# Patient Record
Sex: MALE | Race: ASIAN | NOT HISPANIC OR LATINO | Employment: FULL TIME | URBAN - METROPOLITAN AREA
[De-identification: names, ages, dates, MRNs, and addresses within clinical notes are randomized per-mention and may not be internally consistent; named-entity substitution may affect disease eponyms.]

---

## 2021-03-29 ENCOUNTER — AMBULATORY - HEALTHEAST (OUTPATIENT)
Dept: FAMILY MEDICINE | Facility: CLINIC | Age: 41
End: 2021-03-29

## 2021-03-29 ENCOUNTER — OFFICE VISIT - HEALTHEAST (OUTPATIENT)
Dept: FAMILY MEDICINE | Facility: CLINIC | Age: 41
End: 2021-03-29

## 2021-03-29 DIAGNOSIS — M1A.00X0 CHRONIC GOUTY ARTHRITIS: ICD-10-CM

## 2021-03-29 DIAGNOSIS — R03.0 ELEVATED BLOOD PRESSURE READING WITHOUT DIAGNOSIS OF HYPERTENSION: ICD-10-CM

## 2021-03-29 DIAGNOSIS — L03.90 CELLULITIS, UNSPECIFIED CELLULITIS SITE: ICD-10-CM

## 2021-03-29 DIAGNOSIS — M10.9 ACUTE GOUTY ARTHRITIS: ICD-10-CM

## 2021-03-29 DIAGNOSIS — I10 BENIGN ESSENTIAL HYPERTENSION: ICD-10-CM

## 2021-03-29 DIAGNOSIS — R22.9 LOCALIZED SUPERFICIAL SWELLING, MASS, OR LUMP: ICD-10-CM

## 2021-03-29 LAB
ALBUMIN SERPL-MCNC: 3.8 G/DL (ref 3.5–5)
ALP SERPL-CCNC: 101 U/L (ref 45–120)
ALT SERPL W P-5'-P-CCNC: 35 U/L (ref 0–45)
ANION GAP SERPL CALCULATED.3IONS-SCNC: 13 MMOL/L (ref 5–18)
AST SERPL W P-5'-P-CCNC: 24 U/L (ref 0–40)
BASOPHILS # BLD AUTO: 0.1 THOU/UL (ref 0–0.2)
BASOPHILS NFR BLD AUTO: 0 % (ref 0–2)
BILIRUB SERPL-MCNC: 0.4 MG/DL (ref 0–1)
BUN SERPL-MCNC: 17 MG/DL (ref 8–22)
C REACTIVE PROTEIN LHE: 0.4 MG/DL (ref 0–0.8)
CALCIUM SERPL-MCNC: 9.2 MG/DL (ref 8.5–10.5)
CHLORIDE BLD-SCNC: 107 MMOL/L (ref 98–107)
CO2 SERPL-SCNC: 20 MMOL/L (ref 22–31)
CREAT SERPL-MCNC: 0.86 MG/DL (ref 0.7–1.3)
EOSINOPHIL # BLD AUTO: 0.2 THOU/UL (ref 0–0.4)
EOSINOPHIL NFR BLD AUTO: 2 % (ref 0–6)
ERYTHROCYTE [DISTWIDTH] IN BLOOD BY AUTOMATED COUNT: 20.8 % (ref 11–14.5)
ERYTHROCYTE [SEDIMENTATION RATE] IN BLOOD BY WESTERGREN METHOD: 18 MM/HR (ref 0–15)
GFR SERPL CREATININE-BSD FRML MDRD: >60 ML/MIN/1.73M2
GLUCOSE BLD-MCNC: 98 MG/DL (ref 70–125)
HCT VFR BLD AUTO: 38.4 % (ref 40–54)
HGB BLD-MCNC: 11.2 G/DL (ref 14–18)
IMM GRANULOCYTES # BLD: 0 THOU/UL
IMM GRANULOCYTES NFR BLD: 0 %
LYMPHOCYTES # BLD AUTO: 2.8 THOU/UL (ref 0.8–4.4)
LYMPHOCYTES NFR BLD AUTO: 25 % (ref 20–40)
MCH RBC QN AUTO: 19.9 PG (ref 27–34)
MCHC RBC AUTO-ENTMCNC: 29.2 G/DL (ref 32–36)
MCV RBC AUTO: 68 FL (ref 80–100)
MONOCYTES # BLD AUTO: 0.8 THOU/UL (ref 0–0.9)
MONOCYTES NFR BLD AUTO: 7 % (ref 2–10)
NEUTROPHILS # BLD AUTO: 7.4 THOU/UL (ref 2–7.7)
NEUTROPHILS NFR BLD AUTO: 66 % (ref 50–70)
PLATELET # BLD AUTO: 411 THOU/UL (ref 140–440)
PMV BLD AUTO: 8.3 FL (ref 7–10)
POTASSIUM BLD-SCNC: 4.1 MMOL/L (ref 3.5–5)
PROT SERPL-MCNC: 7.9 G/DL (ref 6–8)
RBC # BLD AUTO: 5.62 MILL/UL (ref 4.4–6.2)
SODIUM SERPL-SCNC: 140 MMOL/L (ref 136–145)
URATE SERPL-MCNC: 10.6 MG/DL (ref 3–8)
WBC: 11.2 THOU/UL (ref 4–11)

## 2021-03-29 RX ORDER — AMLODIPINE BESYLATE 5 MG/1
5 TABLET ORAL DAILY
Qty: 90 TABLET | Refills: 3 | Status: SHIPPED | OUTPATIENT
Start: 2021-03-29 | End: 2023-07-03

## 2021-03-29 RX ORDER — DOXYCYCLINE 100 MG/1
100 CAPSULE ORAL 2 TIMES DAILY
Qty: 20 CAPSULE | Refills: 0 | Status: SHIPPED | OUTPATIENT
Start: 2021-03-29 | End: 2023-07-03

## 2021-03-30 ENCOUNTER — COMMUNICATION - HEALTHEAST (OUTPATIENT)
Dept: FAMILY MEDICINE | Facility: CLINIC | Age: 41
End: 2021-03-30

## 2021-04-01 ENCOUNTER — AMBULATORY - HEALTHEAST (OUTPATIENT)
Dept: FAMILY MEDICINE | Facility: CLINIC | Age: 41
End: 2021-04-01

## 2021-04-01 DIAGNOSIS — M1A.00X0 CHRONIC GOUTY ARTHRITIS: ICD-10-CM

## 2021-04-01 RX ORDER — ALLOPURINOL 100 MG/1
100 TABLET ORAL DAILY
Qty: 90 TABLET | Refills: 4 | Status: SHIPPED | OUTPATIENT
Start: 2021-04-01 | End: 2021-07-20

## 2021-06-05 VITALS
DIASTOLIC BLOOD PRESSURE: 102 MMHG | TEMPERATURE: 98.5 F | SYSTOLIC BLOOD PRESSURE: 148 MMHG | OXYGEN SATURATION: 98 % | HEART RATE: 85 BPM | WEIGHT: 201.08 LBS

## 2021-06-16 PROBLEM — M1A.9XX1 CHRONIC TOPHACEOUS GOUT: Status: ACTIVE | Noted: 2021-04-01

## 2021-06-16 PROBLEM — I10 BENIGN ESSENTIAL HYPERTENSION: Status: ACTIVE | Noted: 2021-03-29

## 2021-06-16 PROBLEM — E79.0 ELEVATED BLOOD URIC ACID LEVEL: Status: ACTIVE | Noted: 2021-04-01

## 2021-06-16 PROBLEM — M1A.00X0 CHRONIC GOUTY ARTHRITIS: Status: ACTIVE | Noted: 2021-03-29

## 2021-06-16 NOTE — PROGRESS NOTES
S:  Tyler Miller is a 40 y.o. male who comes to the clinic today for  1.  Swelling of the hands and feet and joints.  This is been going on for over a year.  He does think that maybe he has a history of gout.  He does say that these are the same joints where he will often get a gouty attack.  The joints get very erythematous and swollen and then will slowly subside but do not completely resolve.  He is also noted some small abscesses over his bilateral feet.  These are quite painful.  They are often open up and drain.  He denies any fevers.  He denies any other joint swelling over his body.  No other significant health history.  Is not been to see a provider in over 10 years.      I reviewed the pertinent family, social, surgical, medical history.    Review of systems is negative for any other lymphadenopathy.  No unexplained weight loss.  No other skin changes have been noted.    O:  BP (!) 148/102 (Patient Site: Left Arm, Patient Position: Sitting, Cuff Size: Adult Large)   Pulse 85   Temp 98.5  F (36.9  C) (Oral)   Wt 201 lb 1.3 oz (91.2 kg)   SpO2 98%   Gen:  Nad, alert  Rrr, no m/r/g  cta bialterally no wheezes or rhonchi noted  No lad noted over his body.    No JVD is appreciated today.  Skin: He has multiple abscesses some of which are draining over his bilateral feet.  1 of which is somewhat tender and swollen and is not draining.  No surrounding erythema.  He has very dry skin that is peeling over his bilateral feet.  Extremities: Over his hands and his feet he has multiple enlarged joints with very little range of motion.  None of these are acutely erythematous or inflamed but all of them are slightly erythematous and very swollen.  These are over his fingers and his toes primarily in the PIP joints.  No other joint erythema is noted.  Is full range of motion in all of his other joints.    There is no problem list on file for this patient.    No current outpatient medications on file prior to visit.      No current facility-administered medications on file prior to visit.           No results found for this or any previous visit (from the past 48 hour(s)).     No images are attached to the encounter or orders placed in the encounter.       Assessment/Plan:  1. Localized superficial swelling, mass, or lump  Concern for gouty arthritis.  Will obtain x-rays and labs today.  I did advise him to eat a diet that is low in alcohol, seafood, meats and high in vegetables.  Will consider medication for gout if indeed this is the diagnosis, once his kidney function is back.  - Uric Acid  - HM1(CBC and Differential)  - Comprehensive Metabolic Panel  - doxycycline (MONODOX) 100 MG capsule; Take 1 capsule (100 mg total) by mouth 2 (two) times a day.  Dispense: 20 capsule; Refill: 0  - C-Reactive Protein(CRP)  - Erythrocyte Sedimentation Rate  - XR Feet Bilateral 2 VWS  - XR Hands Bilateral 2 VWS    2. Cellulitis, unspecified cellulitis site  We will treat his cellulitis with doxycycline today given his unknown kidney function.  Recheck later on this week.  - Uric Acid  - HM1(CBC and Differential)  - Comprehensive Metabolic Panel  - doxycycline (MONODOX) 100 MG capsule; Take 1 capsule (100 mg total) by mouth 2 (two) times a day.  Dispense: 20 capsule; Refill: 0  - C-Reactive Protein(CRP)  - Erythrocyte Sedimentation Rate  - XR Feet Bilateral 2 VWS  - XR Hands Bilateral 2 VWS    3. Acute gouty arthritis    - Uric Acid  - HM1(CBC and Differential)  - Comprehensive Metabolic Panel  - doxycycline (MONODOX) 100 MG capsule; Take 1 capsule (100 mg total) by mouth 2 (two) times a day.  Dispense: 20 capsule; Refill: 0  - C-Reactive Protein(CRP)  - Erythrocyte Sedimentation Rate  - XR Feet Bilateral 2 VWS  - XR Hands Bilateral 2 VWS    4. Elevated blood pressure reading without diagnosis of hypertension  Return on Friday for recheck.   I will start him on a blood pressure medication, renal function pending.          Lupis Guallpa    3/29/2021 3:42 PM

## 2021-06-16 NOTE — TELEPHONE ENCOUNTER
Patient returned call. Patient advised per provider message below. The patient indicates understanding of the result and instructions for follow up and continued care.

## 2021-06-16 NOTE — TELEPHONE ENCOUNTER
----- Message from Lupis Guallpa MD sent at 3/29/2021  9:40 PM CDT -----  Please call pt with an  and let them know that their lab results show that his uric acid is too high.  This goes along with gout, which is what I think is causing his feet and hands to swell the way they are.  His kidney function is normal, so I am going to start him on a medication to help control his blood pressure.  This is called amlodipine.  I am also going to send him to speak with her rheumatologist about his severe gout.  I do recommend he make some diet changes and exclude all alcohol, seafood and most meat

## 2021-06-18 NOTE — PATIENT INSTRUCTIONS - HE
Patient Instructions by Lupis Guallpa MD at 3/29/2021  1:45 PM     Author: Lupis Guallpa MD Service: -- Author Type: Physician    Filed: 3/29/2021  3:52 PM Encounter Date: 3/29/2021 Status: Signed    : Lupis Guallpa MD (Physician)       Patient Education     Eating to Prevent Gout  Gout is a painful form of arthritis caused by an excess of uric acid. This is a waste product made by the body. It builds up in the body and forms crystals that collect in the joints, causing a gout attack. Alcohol and certain foods can trigger a gout attack. Below are some guidelines for changing your diet to help you manage gout. Your healthcare provider can work with you to determine the best eating plan for you. Know that diet is only one part of managing gout. Take your medicines as prescribed and follow the other guidelines your healthcare provider has given you.  Foods to limit  Eating too many foods containing purines may increase the levels of uric acid in your body and increase your risk for a gout attack. It may be best to limit these high-purine foods:    Alcohol (beer and red wine). You may be told to avoid alcohol completely.    Certain fish (anchovies, sardines, fish roes, herring, tuna, mussels, codfish, scallops, trout, and gt)    Certain meats (red meat, processed meat, tello, turkey, wild game, and goose)    Sauces and gravies made with meat    Organ meats (such as liver, kidneys, sweetbreads, and tripe)    Legumes (such as dried beans and peas)    Mushrooms, spinach, asparagus, and cauliflower    Yeast and yeast extract supplements  Foods to try  Some foods may be helpful for people with gout. You may want to try adding some of the following foods to your diet:    Dark berries. These include blueberries, blackberries, and cherries. These berries contain chemicals that may lower uric acid.    Tofu. Tofu, which is made from soy, is a good source of protein. Studies have shown that it  may be a better choice than meat for people with gout.    Omega fatty acids. These acids are found in fatty fish (such as salmon), certain oils (such as flax, olive, or nut oils), or nuts. They may help prevent inflammation due to gout.  The following guidelines are recommended by the American Medical Association for people with gout. Your diet should be:    High in fiber, whole grains, fruits, and vegetables.    Low in protein (15% of calories should come from protein. Choose lean sources, such as soy, lean meats, and poultry).    Low in fat (no more than 30% of calories should come from fat, with only 10% coming from animal, or saturated, fat).  Date Last Reviewed: 11/1/2017 2000-2019 [employer]. [address] [place] [address], [place] [address]. All rights reserved. This information is not intended as a substitute for professional medical care. Always follow your healthcare professional's instructions.

## 2021-06-29 ENCOUNTER — HOSPITAL ENCOUNTER (EMERGENCY)
Dept: EMERGENCY MEDICINE | Facility: HOSPITAL | Age: 41
Discharge: HOME | End: 2021-06-29
Attending: EMERGENCY MEDICINE
Payer: COMMERCIAL

## 2021-06-29 DIAGNOSIS — T24.211A: ICD-10-CM

## 2021-06-29 RX ORDER — OXYCODONE HYDROCHLORIDE 5 MG/1
5 TABLET ORAL EVERY 4 HOURS PRN
Qty: 12 TABLET | Refills: 0 | Status: SHIPPED | OUTPATIENT
Start: 2021-06-29 | End: 2023-07-03

## 2021-06-29 RX ORDER — BACITRACIN ZINC 500 [USP'U]/G
OINTMENT TOPICAL DAILY
Qty: 120 G | Refills: 0 | Status: SHIPPED | OUTPATIENT
Start: 2021-06-29 | End: 2023-07-03

## 2021-06-29 ASSESSMENT — MIFFLIN-ST. JEOR: SCORE: 1692.83
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
## 2021-07-06 VITALS — BODY MASS INDEX: 32.44 KG/M2 | WEIGHT: 190 LBS | HEIGHT: 64 IN
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
## 2021-07-20 ENCOUNTER — OFFICE VISIT (OUTPATIENT)
Dept: RHEUMATOLOGY | Facility: CLINIC | Age: 41
End: 2021-07-20
Attending: FAMILY MEDICINE
Payer: COMMERCIAL

## 2021-07-20 VITALS — HEART RATE: 80 BPM | SYSTOLIC BLOOD PRESSURE: 130 MMHG | DIASTOLIC BLOOD PRESSURE: 82 MMHG

## 2021-07-20 DIAGNOSIS — M25.50 POLYARTHRALGIA: ICD-10-CM

## 2021-07-20 DIAGNOSIS — M85.80 BONE EROSION: ICD-10-CM

## 2021-07-20 DIAGNOSIS — M1A.9XX1 CHRONIC TOPHACEOUS GOUT: Primary | ICD-10-CM

## 2021-07-20 PROCEDURE — 99204 OFFICE O/P NEW MOD 45 MIN: CPT | Performed by: INTERNAL MEDICINE

## 2021-07-20 RX ORDER — PREDNISONE 10 MG/1
10 TABLET ORAL DAILY
Qty: 30 TABLET | Refills: 0 | Status: SHIPPED | OUTPATIENT
Start: 2021-07-20 | End: 2021-08-16

## 2021-07-20 RX ORDER — COLCHICINE 0.6 MG/1
0.6 TABLET ORAL 2 TIMES DAILY
Qty: 60 TABLET | Refills: 3 | Status: SHIPPED | OUTPATIENT
Start: 2021-07-20 | End: 2021-10-18

## 2021-07-20 RX ORDER — ALLOPURINOL 300 MG/1
300 TABLET ORAL DAILY
Qty: 90 TABLET | Refills: 0 | Status: SHIPPED | OUTPATIENT
Start: 2021-07-20 | End: 2023-07-03

## 2021-07-20 NOTE — PROGRESS NOTES
This document was created using a software with less than 100% fidelity, at times resulting in unintended, even erroneous syntax and grammar.  The reader is advised to keep this under consideration while reviewing, interpreting this note.      Rheumatology Consult Note      Tyler Miller     YOB: 1980 Age: 40 year old    Date of visit: 7/20/21    PCP: Noe Lauren    Chief Complaint   Patient presents with:  Consult      Assessment and Plan     Tyler was seen today for consult.    Diagnoses and all orders for this visit:    Chronic tophaceous gout  -     colchicine (COLCYRS) 0.6 MG tablet; Take 1 tablet (0.6 mg) by mouth 2 times daily  -     allopurinol (ZYLOPRIM) 300 MG tablet; Take 1 tablet (300 mg) by mouth daily  -     predniSONE (DELTASONE) 10 MG tablet; Take 1 tablet (10 mg) by mouth daily  -     Creatinine; Standing  -     Uric acid; Standing  -     CBC with Platelets; Standing  -     ALT; Standing    Bone erosion  -     colchicine (COLCYRS) 0.6 MG tablet; Take 1 tablet (0.6 mg) by mouth 2 times daily  -     allopurinol (ZYLOPRIM) 300 MG tablet; Take 1 tablet (300 mg) by mouth daily  -     predniSONE (DELTASONE) 10 MG tablet; Take 1 tablet (10 mg) by mouth daily    Polyarthralgia  -     colchicine (COLCYRS) 0.6 MG tablet; Take 1 tablet (0.6 mg) by mouth 2 times daily  -     allopurinol (ZYLOPRIM) 300 MG tablet; Take 1 tablet (300 mg) by mouth daily  -     predniSONE (DELTASONE) 10 MG tablet; Take 1 tablet (10 mg) by mouth daily  -     Creatinine; Standing  -     Uric acid; Standing  -     CBC with Platelets; Standing  -     ALT; Standing           Return to clinic: 2 months    This patient with severe erosive polyarticular fascia send out with intact renal function, family history of the same, has very advanced disease.  This is discussed.  The state involved are outlined.  He is prepared to take good care of himself, comply with treatment plan, follow-up on regular basis and check labs.  We  discussed the initial flareup that can happen as his serum uric acid drops.  Major side effects of prednisone reviewed.  We will meet here in 2 months with labs prior.      ALLYSON Miller is a 40 year old male  is seen today for evaluation of polyarthralgias.  He is accompanied by his wife, with the  on the phone line.  He reports that he has had joint pains affecting his hands knees and feet going back several years, over the past several months these have gotten worse.  He appears to have been given allopurinol in April with a year-long refills he and his wife noted that.  The pharmacy told them that there is no notes refills.  In this background he reports that his pain is so severe that it is hard for him to ambulate.  This is in his hands, knees, feet.  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  He came in a wheelchair.  He has noted lumps as he puts it on the thigh posteriorly.  He reports overall pain level to be severe to moderately severe.  His father has gout.  He reports no personal or family history of psoriasis ulcerative colitis or Crohn's disease.  He has not had recent acute attack.  X-rays of hands and feet show erosive changes.            Active Problem List     Patient Active Problem List   Diagnosis     Benign essential hypertension     Chronic gouty arthritis     Chronic tophaceous gout     Elevated blood uric acid level     Past Medical History   No past medical history on file.  Past Surgical History   No past surgical history on file.  Allergy   No Known Allergies  Current Medication List     Current Outpatient Medications   Medication Sig     allopurinoL (ZYLOPRIM) 100 MG tablet [ALLOPURINOL (ZYLOPRIM) 100 MG TABLET] Take 1 tablet (100 mg total) by mouth daily.     amLODIPine (NORVASC) 5 MG tablet [AMLODIPINE (NORVASC) 5 MG TABLET] Take 1 tablet (5 mg total) by mouth daily.     bacitracin 500 unit/gram ointment [BACITRACIN  500 UNIT/GRAM OINTMENT] Apply topically daily.     doxycycline (MONODOX) 100 MG capsule [DOXYCYCLINE (MONODOX) 100 MG CAPSULE] Take 1 capsule (100 mg total) by mouth 2 (two) times a day.     oxyCODONE (ROXICODONE) 5 MG immediate release tablet [OXYCODONE (ROXICODONE) 5 MG IMMEDIATE RELEASE TABLET] Take 1 tablet (5 mg total) by mouth every 4 (four) hours as needed for pain.     No current facility-administered medications for this visit.            Family History   No family history on file.  No change in family history since the previous clinic visit.    Physical Exam     COMPREHENSIVE EXAMINATION:  Vitals:    07/20/21 1427   BP: 130/82   Pulse: 80     A well appearing alert oriented male. Vital data as noted above. His eyes examined for inflammation/scleromalacia. ENT examined for oral mucositis, moisture, thrush, nasal deformity, external ear redness, deformity. His neck is examined for suppleness and lymphadenopathy.  Cardiopulmonary system observation includes he is not breathless there is no edema of the ankles he does not have wheezing.  Skin examined for heliotrope, malar area eruption, lupus pernio, periungual erythema, sclerodactyly, papules, erythema nodosum, purpura, nail pitting, onycholysis, and obvious psoriasis lesion. Neurological examination done for alertness, speech, facial symmetry,  tone and power in upper and lower extremities, and gait. The joint examination is performed for swelling, tenderness, warmth, erythema, and range of motion in the following joints: DIPs, PIPs, MCPs, wrists, first CMC's, elbows, shoulders, hips, knees, ankles, feet; spine for range of motion and paraspinal muscles for tenderness. The salient normal / abnormal findings are appendedHe has extensive tophi.  These are on his hands, Such as his DIP over the left fifth digit PIPs of the middle ring and index finger DIP of the left middle finger MCP #2 on the palmar aspect in various digits similar changes on the opposite  side also on the dorsum of the knees, affecting his MCPs especially the left side, on the thighs on the posterior aspect he has tophaceous deposits.           Labs / Imaging (select studies)     Uric Acid   Date Value Ref Range Status   03/29/2021 10.6 (H) 3.0 - 8.0 mg/dL Final      Hemoglobin   Date Value Ref Range Status   03/29/2021 11.2 (L) 14.0 - 18.0 g/dL Final     Urea Nitrogen   Date Value Ref Range Status   03/29/2021 17 8 - 22 mg/dL Final     Erythrocyte Sedimentation Rate   Date Value Ref Range Status   03/29/2021 18 (H) 0 - 15 mm/hr Final     CRP   Date Value Ref Range Status   03/29/2021 0.4 0.0 - 0.8 mg/dL Final     AST   Date Value Ref Range Status   03/29/2021 24 0 - 40 U/L Final     Albumin   Date Value Ref Range Status   03/29/2021 3.8 3.5 - 5.0 g/dL Final     Alkaline Phosphatase   Date Value Ref Range Status   03/29/2021 101 45 - 120 U/L Final     ALT   Date Value Ref Range Status   03/29/2021 35 0 - 45 U/L Final          Immunization History     Immunization History   Administered Date(s) Administered     COVID-19,PF,Moderna 05/03/2021     DT (PEDS <7y) 01/16/2006     Hep B, Peds or Adolescent 11/18/2005     HepB-Adult 10/22/2004, 12/04/2004     Influenza (IIV3) PF 11/18/2005     MMR 10/22/2004, 12/04/2004     Poliovirus, inactivated (IPV) 12/04/2004     TD (ADULT, 7+) 01/16/2006     Td (Adult), Adsorbed 05/28/2004, 10/22/2004     Tdap (Adacel,Boostrix) 06/29/2021     Varicella 05/28/2004, 11/18/2005

## 2021-08-16 ENCOUNTER — VIRTUAL VISIT (OUTPATIENT)
Dept: RHEUMATOLOGY | Facility: CLINIC | Age: 41
End: 2021-08-16
Payer: COMMERCIAL

## 2021-08-16 ENCOUNTER — APPOINTMENT (OUTPATIENT)
Dept: INTERPRETER SERVICES | Facility: CLINIC | Age: 41
End: 2021-08-16
Payer: COMMERCIAL

## 2021-08-16 DIAGNOSIS — M85.80 BONE EROSION: ICD-10-CM

## 2021-08-16 DIAGNOSIS — M1A.9XX1 CHRONIC TOPHACEOUS GOUT: Primary | ICD-10-CM

## 2021-08-16 DIAGNOSIS — M25.50 POLYARTHRALGIA: ICD-10-CM

## 2021-08-16 PROCEDURE — 99214 OFFICE O/P EST MOD 30 MIN: CPT | Mod: 95 | Performed by: INTERNAL MEDICINE

## 2021-08-16 RX ORDER — PREDNISONE 2.5 MG/1
TABLET ORAL
Qty: 90 TABLET | Refills: 2 | Status: SHIPPED | OUTPATIENT
Start: 2021-08-16 | End: 2023-07-03

## 2021-08-16 NOTE — PROGRESS NOTES
Tyler is a 40 year old who is being evaluated via a billable telephone visit.      What phone number would you like to be contacted at? [phone]  How would you like to obtain your AVS? Mail a copy  Phone call duration: 13 minutes    This document was created using a software with less than 100% fidelity, at times resulting in unintended, even erroneous syntax and grammar.  The reader is advised to keep this under consideration while reviewing, interpreting this note.           ASSESSMENT AND PLAN:    Diagnoses and all orders for this visit:  Chronic tophaceous gout  -     predniSONE (DELTASONE) 2.5 MG tablet; 7.5 mg daily for 4 weeks, reduce by 2.5 mg every 4 weeks to 5 milligrams daily, and to 2.5 mg daily and stop  Bone erosion  Polyarthralgia  -     predniSONE (DELTASONE) 2.5 MG tablet; 7.5 mg daily for 4 weeks, reduce by 2.5 mg every 4 weeks to 5 milligrams daily, and to 2.5 mg daily and stop      Follow up in 4 weeks, labs this week, prior to next visit.      HISTORY OF PRESENTING ILLNESS:   Tyler Miller 40 year old is evaluated here via phone link.  This patient has severe erosive gout.  He has been taking prednisone colchicine allopurinol.  Started a month ago.  He noted that the joint pains have improved.  He forgot to do check his labs.  We discussed the importance of making sure that his serum urate is in the target range.  He will come in the next few days.  I have asked him to begin to taper prednisone.  We discussed how allopurinol is an indefinite treatment for his gout.  Colchicine perhaps for the next intermediate future.  Prednisone will be tapered down to 0 over the next few weeks.  This conversation happened via the .            Following is the excerpt from a previous note:      is seen today for evaluation of polyarthralgias.  He is accompanied by his wife, with the  on the phone line.  He reports that he has had joint pains affecting his hands knees and feet going back  several years, over the past several months these have gotten worse.  He appears to have been given allopurinol in April with a year-long refills he and his wife noted that.  The pharmacy told them that there is no notes refills.  In this background he reports that his pain is so severe that it is hard for him to ambulate.  This is in his hands, knees, feet.  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  He came in a wheelchair.  He has noted lumps as he puts it on the thigh posteriorly.  He reports overall pain level to be severe to moderately severe.  His father has gout.  He reports no personal or family history of psoriasis ulcerative colitis or Crohn's disease.  He has not had recent acute attack.  X-rays of hands and feet show erosive changes.           ROS enquiry held for fever, ocular symptoms, rash, headache,  GI issues.  Today we also discussed the issues related to the current pandemic, the pros and cons of the current treatment plan, the CDC guidelines such as social distancing washing the hands covering the cough.  ALLERGIES:Patient has no known allergies.    PAST MEDICAL/ACTIVE PROBLEMS/MEDICATION/SOCIAL DATA  No past medical history on file.  History   Smoking Status     Never Smoker   Smokeless Tobacco     Not on file     Patient Active Problem List   Diagnosis     Benign essential hypertension     Chronic gouty arthritis     Chronic tophaceous gout     Elevated blood uric acid level     Polyarthralgia     Bone erosion     Current Outpatient Medications   Medication Sig Dispense Refill     allopurinol (ZYLOPRIM) 300 MG tablet Take 1 tablet (300 mg) by mouth daily 90 tablet 0     amLODIPine (NORVASC) 5 MG tablet [AMLODIPINE (NORVASC) 5 MG TABLET] Take 1 tablet (5 mg total) by mouth daily. 90 tablet 3     bacitracin 500 unit/gram ointment [BACITRACIN 500 UNIT/GRAM OINTMENT] Apply topically daily. 120 g 0     colchicine (COLCYRS) 0.6 MG tablet Take 1  tablet (0.6 mg) by mouth 2 times daily 60 tablet 3     doxycycline (MONODOX) 100 MG capsule [DOXYCYCLINE (MONODOX) 100 MG CAPSULE] Take 1 capsule (100 mg total) by mouth 2 (two) times a day. 20 capsule 0     oxyCODONE (ROXICODONE) 5 MG immediate release tablet [OXYCODONE (ROXICODONE) 5 MG IMMEDIATE RELEASE TABLET] Take 1 tablet (5 mg total) by mouth every 4 (four) hours as needed for pain. 12 tablet 0     predniSONE (DELTASONE) 10 MG tablet Take 1 tablet (10 mg) by mouth daily 30 tablet 0         EXAMINATION:       On the phone sounded comfortable, alert, oriented, speech was fluent,  memory recall appeared normal, didnot sound like was in pain or short of breath.        LAB / IMAGING DATA:  ALT   Date Value Ref Range Status   03/29/2021 35 0 - 45 U/L Final     Albumin   Date Value Ref Range Status   03/29/2021 3.8 3.5 - 5.0 g/dL Final       WBC   Date Value Ref Range Status   03/29/2021 11.2 (H) 4.0 - 11.0 thou/uL Final     Hemoglobin   Date Value Ref Range Status   03/29/2021 11.2 (L) 14.0 - 18.0 g/dL Final     Platelet Count   Date Value Ref Range Status   03/29/2021 411 140 - 440 thou/uL Final       No results found for: BONY

## 2023-07-03 ENCOUNTER — HOSPITAL ENCOUNTER (INPATIENT)
Facility: HOSPITAL | Age: 43
LOS: 7 days | Discharge: HOME | End: 2023-07-10
Attending: EMERGENCY MEDICINE | Admitting: HOSPITALIST
Payer: COMMERCIAL

## 2023-07-03 ENCOUNTER — APPOINTMENT (OUTPATIENT)
Dept: RADIOLOGY | Facility: HOSPITAL | Age: 43
End: 2023-07-03
Attending: PHYSICIAN ASSISTANT
Payer: COMMERCIAL

## 2023-07-03 ENCOUNTER — APPOINTMENT (OUTPATIENT)
Dept: RADIOLOGY | Facility: HOSPITAL | Age: 43
End: 2023-07-03
Attending: EMERGENCY MEDICINE
Payer: COMMERCIAL

## 2023-07-03 ENCOUNTER — APPOINTMENT (OUTPATIENT)
Dept: MRI IMAGING | Facility: HOSPITAL | Age: 43
End: 2023-07-03
Attending: PHYSICIAN ASSISTANT
Payer: COMMERCIAL

## 2023-07-03 DIAGNOSIS — E79.0 ELEVATED BLOOD URIC ACID LEVEL: ICD-10-CM

## 2023-07-03 DIAGNOSIS — M10.041 ACUTE IDIOPATHIC GOUT OF RIGHT HAND: ICD-10-CM

## 2023-07-03 DIAGNOSIS — M1A.9XX1 CHRONIC TOPHACEOUS GOUT: ICD-10-CM

## 2023-07-03 DIAGNOSIS — M1A.00X0 CHRONIC GOUTY ARTHRITIS: ICD-10-CM

## 2023-07-03 DIAGNOSIS — M00.9 SEPTIC ARTHRITIS OF LEFT ANKLE, DUE TO UNSPECIFIED ORGANISM (H): Primary | ICD-10-CM

## 2023-07-03 DIAGNOSIS — L03.011 CELLULITIS OF FINGER OF RIGHT HAND: ICD-10-CM

## 2023-07-03 DIAGNOSIS — R78.81 BACTEREMIA: ICD-10-CM

## 2023-07-03 DIAGNOSIS — M10.072 ACUTE IDIOPATHIC GOUT OF LEFT ANKLE: ICD-10-CM

## 2023-07-03 LAB
ALBUMIN SERPL BCG-MCNC: 3 G/DL (ref 3.5–5.2)
ALP SERPL-CCNC: 314 U/L (ref 40–129)
ALT SERPL W P-5'-P-CCNC: 85 U/L (ref 0–70)
ANION GAP SERPL CALCULATED.3IONS-SCNC: 15 MMOL/L (ref 7–15)
ANION GAP SERPL CALCULATED.3IONS-SCNC: 17 MMOL/L (ref 7–15)
AST SERPL W P-5'-P-CCNC: 58 U/L (ref 0–45)
BASOPHILS # BLD AUTO: 0 10E3/UL (ref 0–0.2)
BASOPHILS NFR BLD AUTO: 0 %
BILIRUB SERPL-MCNC: 1.8 MG/DL
BUN SERPL-MCNC: 13.9 MG/DL (ref 6–20)
BUN SERPL-MCNC: 17.4 MG/DL (ref 6–20)
CALCIUM SERPL-MCNC: 9 MG/DL (ref 8.6–10)
CALCIUM SERPL-MCNC: 9.7 MG/DL (ref 8.6–10)
CHLORIDE SERPL-SCNC: 96 MMOL/L (ref 98–107)
CHLORIDE SERPL-SCNC: 99 MMOL/L (ref 98–107)
CREAT SERPL-MCNC: 0.97 MG/DL (ref 0.67–1.17)
CREAT SERPL-MCNC: 1.13 MG/DL (ref 0.67–1.17)
CRP SERPL-MCNC: 182.3 MG/L
CRP SERPL-MCNC: 271.6 MG/L
DEPRECATED HCO3 PLAS-SCNC: 19 MMOL/L (ref 22–29)
DEPRECATED HCO3 PLAS-SCNC: 20 MMOL/L (ref 22–29)
ENTEROCOCCUS FAECALIS: NOT DETECTED
ENTEROCOCCUS FAECIUM: NOT DETECTED
EOSINOPHIL # BLD AUTO: 0 10E3/UL (ref 0–0.7)
EOSINOPHIL NFR BLD AUTO: 0 %
ERYTHROCYTE [DISTWIDTH] IN BLOOD BY AUTOMATED COUNT: 17.4 % (ref 10–15)
ERYTHROCYTE [DISTWIDTH] IN BLOOD BY AUTOMATED COUNT: 17.7 % (ref 10–15)
ERYTHROCYTE [SEDIMENTATION RATE] IN BLOOD BY WESTERGREN METHOD: 58 MM/HR (ref 0–15)
FERRITIN SERPL-MCNC: 147 NG/ML (ref 31–409)
GFR SERPL CREATININE-BSD FRML MDRD: 84 ML/MIN/1.73M2
GFR SERPL CREATININE-BSD FRML MDRD: >90 ML/MIN/1.73M2
GLUCOSE SERPL-MCNC: 132 MG/DL (ref 70–99)
GLUCOSE SERPL-MCNC: 136 MG/DL (ref 70–99)
HCT VFR BLD AUTO: 37.1 % (ref 40–53)
HCT VFR BLD AUTO: 37.4 % (ref 40–53)
HGB BLD-MCNC: 11 G/DL (ref 13.3–17.7)
HGB BLD-MCNC: 11.3 G/DL (ref 13.3–17.7)
IMM GRANULOCYTES # BLD: 0.4 10E3/UL
IMM GRANULOCYTES NFR BLD: 2 %
IRON BINDING CAPACITY (ROCHE): 260 UG/DL (ref 240–430)
IRON SATN MFR SERPL: 3 % (ref 15–46)
IRON SERPL-MCNC: 8 UG/DL (ref 61–157)
LACTATE SERPL-SCNC: 2.7 MMOL/L (ref 0.7–2)
LACTATE SERPL-SCNC: 3.8 MMOL/L (ref 0.7–2)
LISTERIA SPECIES (DETECTED/NOT DETECTED): NOT DETECTED
LYMPHOCYTES # BLD AUTO: 0.5 10E3/UL (ref 0.8–5.3)
LYMPHOCYTES NFR BLD AUTO: 3 %
MCH RBC QN AUTO: 21 PG (ref 26.5–33)
MCH RBC QN AUTO: 21.1 PG (ref 26.5–33)
MCHC RBC AUTO-ENTMCNC: 29.4 G/DL (ref 31.5–36.5)
MCHC RBC AUTO-ENTMCNC: 30.5 G/DL (ref 31.5–36.5)
MCV RBC AUTO: 69 FL (ref 78–100)
MCV RBC AUTO: 71 FL (ref 78–100)
MONOCYTES # BLD AUTO: 0.3 10E3/UL (ref 0–1.3)
MONOCYTES NFR BLD AUTO: 2 %
MRSA DNA SPEC QL NAA+PROBE: NEGATIVE
NEUTROPHILS # BLD AUTO: 19.1 10E3/UL (ref 1.6–8.3)
NEUTROPHILS NFR BLD AUTO: 93 %
NRBC # BLD AUTO: 0 10E3/UL
NRBC BLD AUTO-RTO: 0 /100
PLATELET # BLD AUTO: 386 10E3/UL (ref 150–450)
PLATELET # BLD AUTO: 439 10E3/UL (ref 150–450)
POTASSIUM SERPL-SCNC: 3.4 MMOL/L (ref 3.4–5.3)
POTASSIUM SERPL-SCNC: 3.6 MMOL/L (ref 3.4–5.3)
POTASSIUM SERPL-SCNC: 4.3 MMOL/L (ref 3.4–5.3)
PROT SERPL-MCNC: 7.9 G/DL (ref 6.4–8.3)
RBC # BLD AUTO: 5.25 10E6/UL (ref 4.4–5.9)
RBC # BLD AUTO: 5.36 10E6/UL (ref 4.4–5.9)
SA TARGET DNA: POSITIVE
SODIUM SERPL-SCNC: 133 MMOL/L (ref 136–145)
SODIUM SERPL-SCNC: 133 MMOL/L (ref 136–145)
STAPHYLOCOCCUS AUREUS: NOT DETECTED
STAPHYLOCOCCUS EPIDERMIDIS: NOT DETECTED
STAPHYLOCOCCUS LUGDUNENSIS: NOT DETECTED
STAPHYLOCOCCUS SPECIES: NOT DETECTED
STREPTOCOCCUS AGALACTIAE: NOT DETECTED
STREPTOCOCCUS ANGINOSUS GROUP: NOT DETECTED
STREPTOCOCCUS PNEUMONIAE: NOT DETECTED
STREPTOCOCCUS PYOGENES: DETECTED
URATE SERPL-MCNC: 8 MG/DL (ref 3.4–7)
VIT B12 SERPL-MCNC: 601 PG/ML (ref 232–1245)
WBC # BLD AUTO: 20.3 10E3/UL (ref 4–11)
WBC # BLD AUTO: 33.1 10E3/UL (ref 4–11)

## 2023-07-03 PROCEDURE — 99223 1ST HOSP IP/OBS HIGH 75: CPT | Performed by: HOSPITALIST

## 2023-07-03 PROCEDURE — 250N000013 HC RX MED GY IP 250 OP 250 PS 637: Performed by: INTERNAL MEDICINE

## 2023-07-03 PROCEDURE — 250N000013 HC RX MED GY IP 250 OP 250 PS 637: Performed by: HOSPITALIST

## 2023-07-03 PROCEDURE — 73630 X-RAY EXAM OF FOOT: CPT | Mod: 50

## 2023-07-03 PROCEDURE — 85014 HEMATOCRIT: CPT | Performed by: HOSPITALIST

## 2023-07-03 PROCEDURE — 80053 COMPREHEN METABOLIC PANEL: CPT | Performed by: EMERGENCY MEDICINE

## 2023-07-03 PROCEDURE — 83605 ASSAY OF LACTIC ACID: CPT | Performed by: HOSPITALIST

## 2023-07-03 PROCEDURE — 84132 ASSAY OF SERUM POTASSIUM: CPT | Performed by: HOSPITALIST

## 2023-07-03 PROCEDURE — 82728 ASSAY OF FERRITIN: CPT | Performed by: HOSPITALIST

## 2023-07-03 PROCEDURE — 36415 COLL VENOUS BLD VENIPUNCTURE: CPT | Performed by: HOSPITALIST

## 2023-07-03 PROCEDURE — 87077 CULTURE AEROBIC IDENTIFY: CPT | Performed by: EMERGENCY MEDICINE

## 2023-07-03 PROCEDURE — 258N000003 HC RX IP 258 OP 636: Performed by: INTERNAL MEDICINE

## 2023-07-03 PROCEDURE — 87149 DNA/RNA DIRECT PROBE: CPT | Performed by: EMERGENCY MEDICINE

## 2023-07-03 PROCEDURE — 250N000011 HC RX IP 250 OP 636: Performed by: INTERNAL MEDICINE

## 2023-07-03 PROCEDURE — 250N000011 HC RX IP 250 OP 636: Mod: JZ | Performed by: HOSPITALIST

## 2023-07-03 PROCEDURE — 99285 EMERGENCY DEPT VISIT HI MDM: CPT

## 2023-07-03 PROCEDURE — 99255 IP/OBS CONSLTJ NEW/EST HI 80: CPT | Performed by: INTERNAL MEDICINE

## 2023-07-03 PROCEDURE — 87205 SMEAR GRAM STAIN: CPT | Performed by: EMERGENCY MEDICINE

## 2023-07-03 PROCEDURE — 82607 VITAMIN B-12: CPT | Performed by: HOSPITALIST

## 2023-07-03 PROCEDURE — 83550 IRON BINDING TEST: CPT | Performed by: HOSPITALIST

## 2023-07-03 PROCEDURE — 85652 RBC SED RATE AUTOMATED: CPT | Performed by: PHYSICIAN ASSISTANT

## 2023-07-03 PROCEDURE — 85025 COMPLETE CBC W/AUTO DIFF WBC: CPT | Performed by: EMERGENCY MEDICINE

## 2023-07-03 PROCEDURE — 258N000003 HC RX IP 258 OP 636: Performed by: HOSPITALIST

## 2023-07-03 PROCEDURE — 73720 MRI LWR EXTREMITY W/O&W/DYE: CPT | Mod: LT

## 2023-07-03 PROCEDURE — 86140 C-REACTIVE PROTEIN: CPT | Performed by: EMERGENCY MEDICINE

## 2023-07-03 PROCEDURE — 86140 C-REACTIVE PROTEIN: CPT | Performed by: PHYSICIAN ASSISTANT

## 2023-07-03 PROCEDURE — 87641 MR-STAPH DNA AMP PROBE: CPT | Performed by: HOSPITALIST

## 2023-07-03 PROCEDURE — 36415 COLL VENOUS BLD VENIPUNCTURE: CPT | Performed by: PHYSICIAN ASSISTANT

## 2023-07-03 PROCEDURE — 84550 ASSAY OF BLOOD/URIC ACID: CPT | Performed by: EMERGENCY MEDICINE

## 2023-07-03 PROCEDURE — A9585 GADOBUTROL INJECTION: HCPCS | Mod: JZ | Performed by: PHYSICIAN ASSISTANT

## 2023-07-03 PROCEDURE — 120N000001 HC R&B MED SURG/OB

## 2023-07-03 PROCEDURE — 250N000012 HC RX MED GY IP 250 OP 636 PS 637: Performed by: EMERGENCY MEDICINE

## 2023-07-03 PROCEDURE — 255N000002 HC RX 255 OP 636: Mod: JZ | Performed by: PHYSICIAN ASSISTANT

## 2023-07-03 PROCEDURE — 87070 CULTURE OTHR SPECIMN AEROBIC: CPT | Performed by: EMERGENCY MEDICINE

## 2023-07-03 PROCEDURE — 250N000011 HC RX IP 250 OP 636: Mod: JZ | Performed by: EMERGENCY MEDICINE

## 2023-07-03 PROCEDURE — 250N000011 HC RX IP 250 OP 636: Mod: JZ | Performed by: INTERNAL MEDICINE

## 2023-07-03 PROCEDURE — 73610 X-RAY EXAM OF ANKLE: CPT | Mod: LT

## 2023-07-03 PROCEDURE — 73723 MRI JOINT LWR EXTR W/O&W/DYE: CPT | Mod: LT

## 2023-07-03 PROCEDURE — 73140 X-RAY EXAM OF FINGER(S): CPT | Mod: RT

## 2023-07-03 PROCEDURE — 36415 COLL VENOUS BLD VENIPUNCTURE: CPT | Performed by: EMERGENCY MEDICINE

## 2023-07-03 RX ORDER — CEFTRIAXONE 1 G/1
1 INJECTION, POWDER, FOR SOLUTION INTRAMUSCULAR; INTRAVENOUS ONCE
Status: COMPLETED | OUTPATIENT
Start: 2023-07-03 | End: 2023-07-03

## 2023-07-03 RX ORDER — VANCOMYCIN HYDROCHLORIDE 1 G/200ML
1000 INJECTION, SOLUTION INTRAVENOUS EVERY 12 HOURS
Status: DISCONTINUED | OUTPATIENT
Start: 2023-07-03 | End: 2023-07-04

## 2023-07-03 RX ORDER — POTASSIUM CHLORIDE 1500 MG/1
40 TABLET, EXTENDED RELEASE ORAL ONCE
Status: COMPLETED | OUTPATIENT
Start: 2023-07-03 | End: 2023-07-03

## 2023-07-03 RX ORDER — NALOXONE HYDROCHLORIDE 0.4 MG/ML
0.2 INJECTION, SOLUTION INTRAMUSCULAR; INTRAVENOUS; SUBCUTANEOUS
Status: DISCONTINUED | OUTPATIENT
Start: 2023-07-03 | End: 2023-07-10 | Stop reason: HOSPADM

## 2023-07-03 RX ORDER — COLCHICINE 0.6 MG/1
0.6 TABLET ORAL 2 TIMES DAILY PRN
Status: ON HOLD | COMMUNITY
End: 2023-07-10

## 2023-07-03 RX ORDER — HYDROMORPHONE HYDROCHLORIDE 1 MG/ML
0.5 INJECTION, SOLUTION INTRAMUSCULAR; INTRAVENOUS; SUBCUTANEOUS ONCE
Status: COMPLETED | OUTPATIENT
Start: 2023-07-03 | End: 2023-07-03

## 2023-07-03 RX ORDER — AMLODIPINE BESYLATE 2.5 MG/1
2.5 TABLET ORAL DAILY
Status: DISCONTINUED | OUTPATIENT
Start: 2023-07-03 | End: 2023-07-03

## 2023-07-03 RX ORDER — FEBUXOSTAT 40 MG/1
40 TABLET, FILM COATED ORAL DAILY
Status: DISCONTINUED | OUTPATIENT
Start: 2023-07-03 | End: 2023-07-10 | Stop reason: HOSPADM

## 2023-07-03 RX ORDER — ACETAMINOPHEN 325 MG/1
650 TABLET ORAL EVERY 6 HOURS PRN
Status: DISCONTINUED | OUTPATIENT
Start: 2023-07-03 | End: 2023-07-10 | Stop reason: HOSPADM

## 2023-07-03 RX ORDER — NALOXONE HYDROCHLORIDE 0.4 MG/ML
0.4 INJECTION, SOLUTION INTRAMUSCULAR; INTRAVENOUS; SUBCUTANEOUS
Status: DISCONTINUED | OUTPATIENT
Start: 2023-07-03 | End: 2023-07-10 | Stop reason: HOSPADM

## 2023-07-03 RX ORDER — FEBUXOSTAT 40 MG/1
40 TABLET, FILM COATED ORAL DAILY
COMMUNITY

## 2023-07-03 RX ORDER — OXYCODONE HYDROCHLORIDE 5 MG/1
5 TABLET ORAL EVERY 6 HOURS PRN
Status: DISCONTINUED | OUTPATIENT
Start: 2023-07-03 | End: 2023-07-05

## 2023-07-03 RX ORDER — ONDANSETRON 2 MG/ML
4 INJECTION INTRAMUSCULAR; INTRAVENOUS EVERY 6 HOURS PRN
Status: DISCONTINUED | OUTPATIENT
Start: 2023-07-03 | End: 2023-07-05

## 2023-07-03 RX ORDER — GADOBUTROL 604.72 MG/ML
8 INJECTION INTRAVENOUS ONCE
Status: COMPLETED | OUTPATIENT
Start: 2023-07-04 | End: 2023-07-03

## 2023-07-03 RX ORDER — PREDNISONE 10 MG/1
10 TABLET ORAL DAILY
Status: DISCONTINUED | OUTPATIENT
Start: 2023-07-03 | End: 2023-07-03

## 2023-07-03 RX ORDER — CEFTRIAXONE 2 G/1
2 INJECTION, POWDER, FOR SOLUTION INTRAMUSCULAR; INTRAVENOUS EVERY 24 HOURS
Status: DISCONTINUED | OUTPATIENT
Start: 2023-07-03 | End: 2023-07-10 | Stop reason: HOSPADM

## 2023-07-03 RX ORDER — INDOMETHACIN 25 MG/1
50 CAPSULE ORAL 3 TIMES DAILY PRN
Status: DISCONTINUED | OUTPATIENT
Start: 2023-07-03 | End: 2023-07-06

## 2023-07-03 RX ORDER — HYDROMORPHONE HYDROCHLORIDE 1 MG/ML
0.3 INJECTION, SOLUTION INTRAMUSCULAR; INTRAVENOUS; SUBCUTANEOUS EVERY 6 HOURS PRN
Status: DISCONTINUED | OUTPATIENT
Start: 2023-07-03 | End: 2023-07-05

## 2023-07-03 RX ORDER — SODIUM CHLORIDE 9 MG/ML
INJECTION, SOLUTION INTRAVENOUS CONTINUOUS
Status: DISCONTINUED | OUTPATIENT
Start: 2023-07-03 | End: 2023-07-03

## 2023-07-03 RX ORDER — ACETAMINOPHEN 650 MG/1
650 SUPPOSITORY RECTAL EVERY 6 HOURS PRN
Status: DISCONTINUED | OUTPATIENT
Start: 2023-07-03 | End: 2023-07-10 | Stop reason: HOSPADM

## 2023-07-03 RX ORDER — COLCHICINE 0.6 MG/1
0.6 TABLET ORAL 2 TIMES DAILY
Status: DISCONTINUED | OUTPATIENT
Start: 2023-07-03 | End: 2023-07-03

## 2023-07-03 RX ORDER — COLCHICINE 0.6 MG/1
1.2 TABLET ORAL ONCE
Status: COMPLETED | OUTPATIENT
Start: 2023-07-03 | End: 2023-07-03

## 2023-07-03 RX ORDER — PANTOPRAZOLE SODIUM 40 MG/1
40 TABLET, DELAYED RELEASE ORAL
Status: DISCONTINUED | OUTPATIENT
Start: 2023-07-03 | End: 2023-07-10 | Stop reason: HOSPADM

## 2023-07-03 RX ORDER — HYDROMORPHONE HYDROCHLORIDE 1 MG/ML
0.3 INJECTION, SOLUTION INTRAMUSCULAR; INTRAVENOUS; SUBCUTANEOUS EVERY 4 HOURS PRN
Status: DISCONTINUED | OUTPATIENT
Start: 2023-07-03 | End: 2023-07-03

## 2023-07-03 RX ORDER — COLCHICINE 0.6 MG/1
0.6 TABLET ORAL 2 TIMES DAILY
Status: DISCONTINUED | OUTPATIENT
Start: 2023-07-04 | End: 2023-07-10 | Stop reason: HOSPADM

## 2023-07-03 RX ORDER — COLCHICINE 0.6 MG/1
0.6 TABLET ORAL ONCE
Status: COMPLETED | OUTPATIENT
Start: 2023-07-03 | End: 2023-07-03

## 2023-07-03 RX ORDER — SODIUM CHLORIDE 9 MG/ML
INJECTION, SOLUTION INTRAVENOUS CONTINUOUS
Status: ACTIVE | OUTPATIENT
Start: 2023-07-03 | End: 2023-07-04

## 2023-07-03 RX ORDER — ONDANSETRON 4 MG/1
4 TABLET, ORALLY DISINTEGRATING ORAL EVERY 6 HOURS PRN
Status: DISCONTINUED | OUTPATIENT
Start: 2023-07-03 | End: 2023-07-05

## 2023-07-03 RX ORDER — CEFTRIAXONE 1 G/1
1 INJECTION, POWDER, FOR SOLUTION INTRAMUSCULAR; INTRAVENOUS EVERY 24 HOURS
Status: DISCONTINUED | OUTPATIENT
Start: 2023-07-03 | End: 2023-07-03

## 2023-07-03 RX ORDER — ALLOPURINOL 300 MG/1
300 TABLET ORAL DAILY
Status: DISCONTINUED | OUTPATIENT
Start: 2023-07-03 | End: 2023-07-03

## 2023-07-03 RX ORDER — CEFAZOLIN SODIUM 1 G/50ML
20 SOLUTION INTRAVENOUS ONCE
Status: COMPLETED | OUTPATIENT
Start: 2023-07-03 | End: 2023-07-03

## 2023-07-03 RX ORDER — PREDNISONE 20 MG/1
40 TABLET ORAL ONCE
Status: COMPLETED | OUTPATIENT
Start: 2023-07-03 | End: 2023-07-03

## 2023-07-03 RX ORDER — LIDOCAINE 40 MG/G
CREAM TOPICAL
Status: DISCONTINUED | OUTPATIENT
Start: 2023-07-03 | End: 2023-07-10 | Stop reason: HOSPADM

## 2023-07-03 RX ORDER — PREDNISONE 10 MG/1
10 TABLET ORAL DAILY
Status: DISCONTINUED | OUTPATIENT
Start: 2023-07-04 | End: 2023-07-03

## 2023-07-03 RX ADMIN — CEFTRIAXONE SODIUM 2 G: 2 INJECTION, POWDER, FOR SOLUTION INTRAMUSCULAR; INTRAVENOUS at 22:00

## 2023-07-03 RX ADMIN — COLCHICINE 1.2 MG: 0.6 TABLET, FILM COATED ORAL at 09:57

## 2023-07-03 RX ADMIN — VANCOMYCIN HYDROCHLORIDE 1000 MG: 1 INJECTION, SOLUTION INTRAVENOUS at 17:22

## 2023-07-03 RX ADMIN — ACETAMINOPHEN 650 MG: 325 TABLET ORAL at 06:27

## 2023-07-03 RX ADMIN — OXYCODONE HYDROCHLORIDE 5 MG: 5 TABLET ORAL at 16:51

## 2023-07-03 RX ADMIN — SODIUM CHLORIDE 250 ML: 9 INJECTION, SOLUTION INTRAVENOUS at 09:56

## 2023-07-03 RX ADMIN — PANTOPRAZOLE SODIUM 40 MG: 40 TABLET, DELAYED RELEASE ORAL at 09:55

## 2023-07-03 RX ADMIN — ACETAMINOPHEN 650 MG: 325 TABLET ORAL at 13:30

## 2023-07-03 RX ADMIN — HYDROMORPHONE HYDROCHLORIDE 0.5 MG: 1 INJECTION, SOLUTION INTRAMUSCULAR; INTRAVENOUS; SUBCUTANEOUS at 01:45

## 2023-07-03 RX ADMIN — SODIUM CHLORIDE 500 ML: 9 INJECTION, SOLUTION INTRAVENOUS at 15:36

## 2023-07-03 RX ADMIN — PREDNISONE 40 MG: 20 TABLET ORAL at 03:11

## 2023-07-03 RX ADMIN — INDOMETHACIN 50 MG: 25 CAPSULE ORAL at 18:08

## 2023-07-03 RX ADMIN — OXYCODONE HYDROCHLORIDE 5 MG: 5 TABLET ORAL at 04:12

## 2023-07-03 RX ADMIN — GADOBUTROL 8 ML: 604.72 INJECTION INTRAVENOUS at 23:39

## 2023-07-03 RX ADMIN — COLCHICINE 0.6 MG: 0.6 TABLET, FILM COATED ORAL at 13:32

## 2023-07-03 RX ADMIN — POTASSIUM CHLORIDE 40 MEQ: 1500 TABLET, EXTENDED RELEASE ORAL at 07:56

## 2023-07-03 RX ADMIN — VANCOMYCIN HYDROCHLORIDE 1750 MG: 5 INJECTION, POWDER, LYOPHILIZED, FOR SOLUTION INTRAVENOUS at 04:07

## 2023-07-03 RX ADMIN — CEFTRIAXONE SODIUM 1 G: 1 INJECTION, POWDER, FOR SOLUTION INTRAMUSCULAR; INTRAVENOUS at 03:13

## 2023-07-03 RX ADMIN — HYDROMORPHONE HYDROCHLORIDE 0.3 MG: 1 INJECTION, SOLUTION INTRAMUSCULAR; INTRAVENOUS; SUBCUTANEOUS at 22:00

## 2023-07-03 RX ADMIN — HYDROMORPHONE HYDROCHLORIDE 0.5 MG: 1 INJECTION, SOLUTION INTRAMUSCULAR; INTRAVENOUS; SUBCUTANEOUS at 23:20

## 2023-07-03 RX ADMIN — SODIUM CHLORIDE: 9 INJECTION, SOLUTION INTRAVENOUS at 10:04

## 2023-07-03 RX ADMIN — OXYCODONE HYDROCHLORIDE 5 MG: 5 TABLET ORAL at 10:01

## 2023-07-03 RX ADMIN — FEBUXOSTAT 40 MG: 40 TABLET, FILM COATED ORAL at 09:57

## 2023-07-03 RX ADMIN — HYDROMORPHONE HYDROCHLORIDE 0.5 MG: 1 INJECTION, SOLUTION INTRAMUSCULAR; INTRAVENOUS; SUBCUTANEOUS at 03:09

## 2023-07-03 RX ADMIN — HYDROMORPHONE HYDROCHLORIDE 0.3 MG: 1 INJECTION, SOLUTION INTRAMUSCULAR; INTRAVENOUS; SUBCUTANEOUS at 08:26

## 2023-07-03 RX ADMIN — HYDROMORPHONE HYDROCHLORIDE 0.3 MG: 1 INJECTION, SOLUTION INTRAMUSCULAR; INTRAVENOUS; SUBCUTANEOUS at 14:21

## 2023-07-03 RX ADMIN — INDOMETHACIN 50 MG: 25 CAPSULE ORAL at 09:56

## 2023-07-03 ASSESSMENT — ACTIVITIES OF DAILY LIVING (ADL)
ADLS_ACUITY_SCORE: 38
ADLS_ACUITY_SCORE: 33
ADLS_ACUITY_SCORE: 38
ADLS_ACUITY_SCORE: 33
ADLS_ACUITY_SCORE: 37
ADLS_ACUITY_SCORE: 35
ADLS_ACUITY_SCORE: 38
ADLS_ACUITY_SCORE: 37

## 2023-07-03 NOTE — PHARMACY-ADMISSION MEDICATION HISTORY
Pharmacy Intern Admission Medication History    Admission medication history is complete. The information provided in this note is only as accurate as the sources available at the time of the update.    Medication reconciliation/reorder completed by provider prior to medication history? No    Information Source(s): Patient, Family member and CareEverywhere/SureScripts via in-person    Pertinent Information:   - Wife was present and helped  - Was seeing 2 different providers for gout   - Brought in febuxostat and colchicine bottles  - Did not remember anything for blood pressure control    Changes made to PTA medication list:    Added: None    Deleted: allopurinol, amlodipine, prednisone, doxycycline, oxycodone    Changed: None    Medication Affordability:  Not including over the counter (OTC) medications, was there a time in the past 3 months when you did not take your medications as prescribed because of cost?: No    Allergies reviewed with patient and updates made in EHR: yes    Medication History Completed By: Keyanna Miller 7/3/2023 8:24 AM    Prior to Admission medications    Medication Sig Last Dose Taking? Auth Provider Long Term End Date   colchicine (COLCRYS) 0.6 MG tablet Take 0.6 mg by mouth 2 times daily as needed for gout pain 7/2/2023 at AM Yes Reported, Patient     febuxostat (ULORIC) 40 MG TABS tablet Take 40 mg by mouth daily 7/2/2023 at am Yes Reported, Patient

## 2023-07-03 NOTE — LETTER
[place] [address]  [address] [facility] [address]  Phone: [phone]  Fax: [phone]    July 9, 2023        See Paul  [address] [place] [address]          To whom it may concern:    RE: See Paul    Mrs. Miller has been missing work due to her  being admitted to the hospital from July 3 to at least tomorrow July 10.     Please contact me for questions or concerns.      Sincerely,        Phill Mullins D.O.

## 2023-07-03 NOTE — PROGRESS NOTES
Please refer to H&P from this morning for details.    Patient seen and examined.  Complaining of severe pain at multiple joint sites, mostly on lower extremities/feet joints.  Patient has gout extensive tophi and also open wounds on his thumb, both feet.    Patient denied feeling feverish.    Denied short of breath or chest pain.  Denied abdomen pain, nausea, vomiting.    Reviewed home medications with pharmacist and patient.    Ordered colchicine loading dose then 0.6 mg twice daily.  Also added as needed  indomethacin.  Personally discussed with patient's nurse to utilize as needed NSAIDs if pain not controlled with colchicine then narcotics given acute gouty attack flareup.  Added PPI for GI prophylaxis    Given significant leukocytosis, concern for septic joint and will avoid steroids.  Mild lactic acidosis, ordered IV fluid.    Currently on IV Vanco and Rocephin due to to concern for septic joints.  ID and orthopedic provider consulted.    This note is not for billing purposes.      Note created using dragon voice recognition software.  Errors in spelling or words which seems out of context are unintentional.  Sounds alike errors may have escaped editing.    7/3/2023  OSMEL QUINN MD  [facility]  PAGER [address]. [phone]

## 2023-07-03 NOTE — LETTER
[place] [address] [facility] [address]  Phone: [phone]  Fax: [phone]    July 9, 2023        Tyler Miller  [address] [place] [address]          To whom it may concern:    RE: Tyler Miller    Patient has been admitted to the hospital from July 3 through at least tomorrow July 10.    Please contact me for questions or concerns.      Sincerely,        Phill Mullins D.O.

## 2023-07-03 NOTE — ED TRIAGE NOTES
Patient has HX of severe gout. His right thumb has pus and is swollen, infected appearing. His main complaint is left foot iglesia/gout pain. Right thumb infected for 2 days. He has been taking Tylenol, Febuxostat, and Cholchicine, with little relief. Pain in left ankle/foot a 10.     Triage Assessment     Row Name 07/03/23 0112       Triage Assessment (Adult)    Airway WDL WDL       Respiratory WDL    Respiratory WDL WDL       Skin Circulation/Temperature WDL    Skin Circulation/Temperature WDL X       Cardiac WDL    Cardiac WDL WDL       Peripheral/Neurovascular WDL    Peripheral Neurovascular WDL WDL       Cognitive/Neuro/Behavioral WDL    Cognitive/Neuro/Behavioral WDL WDL

## 2023-07-03 NOTE — CONSULTS
ORTHOPEDIC CONSULTATION    Consultation  Tyler Medina,  1980, MRN [id number]    [facility]  Cellulitis of finger of right hand [L03.011]    PCP: TOMÁS CARRIZALES, None   Code status:  Full Code       Extended Emergency Contact Information  Primary Emergency Contact: Stanton Scott  Home Phone: [phone]  Mobile Phone: [phone]  Relation: Spouse  Secondary Emergency Contact: MINERVA MEDINA  Home Phone: [phone]  Relation: Son         IMPRESSION:  1.  Severe tophaceous gout of multiple joints: left ankle, bilateral feet, bilateral hands   2.  Chronic hand and foot ulcerations for months/years per patient  3.  Right thumb swab obtained in ED, + GPC; Blood cx + GPC in pairs and chains on 1st day incubation   4.  Left ankle swelling and pain, acute gout flare vs septic arthritis     PLAN:  This patient was discussed with Dr. Job Short and Dr. Jonathan Park, on-call surgeons for [facility] and they are in agreement with the following plan.     Dr. Park, our orthopedic hand surgeon, was notified of the patient and will be making recommendations with regards to his right hand. The right thumb was swabbed and is positive for GPC, empiric antibiotics started per ID. Patient reports chronic changes here and mild pain.     He is experiencing more left ankle pain and is unable to tolerate active or passive ankle ROM. We will work up his left ankle and feet for further pathology. After further discussion with Dr. Short, we are ordering left foot and ankle MRIs. Inflammatory markers ordered.     May recommend IR aspiration of the left ankle joint and fluid analysis for gram stain, aerobic and anaerobic cultures with sensitivities, crystals, and cell count if advanced imaging warrants.  We will continue to follow for advanced imaging results.     From an orthopedic standpoint, the patient's baseline gouty arthritis deposits, joint destruction and chronic ulcerations are exceptionally difficult to manage.  All  "interventions are attempts at sparing amputations of digits/extremities. Continue oral gout management and pain medications.     ID has been consulted and giving empiric antibiotic therapies for septic arthritis and multiple complaints.     We will await results to determine if ankle I&D is warranted.     Thank you for including [facility] in the care of Tyler Miller. It has been a pleasure participating in his care.        CHIEF COMPLAINT: Acute idiopathic gout of right hand    HISTORY OF PRESENT ILLNESS:  The patient is seen in orthopedic consultation at the request of Dr. Coates.  The patient is a 42 year old male with severe pain, discomfort and ache of the right thumb, left ankle, and bilateral foot pain.  The patient reports that he has a history of gout for many years. He has been managing his acute gout flares with intermittent prednisone therapy until recently with chronic gouty deformities of his hands and feet. He has chronic ulcerations on his hands and feet. He was started on colchicine and uloric approximately 2 months ago per patient.  The patient describes their pain as sharp, throbbing, and aching. He is experiencing more pain within the left ankle today. He has mild pain in the right thumb. They report that their pain does not radiate. The patient reports that their pain is alleviated by rest and is exacerbated by ROM.    PAST MEDICAL HISTORY:  See hospital chart      ALLERGIES:   Review of patient's allergies indicates   Allergies   Allergen Reactions     Allopurinol      \"Itching throughout body\"         MEDICATIONS UPON ADMISSION:  Medications were reviewed.  They include:   (Not in a hospital admission)        SOCIAL HISTORY:   he  reports that he has never smoked. He does not have any smokeless tobacco history on file. He reports that he does not drink alcohol and does not use drugs.    FAMILY HISTORY:  family history is not on file.      REVIEW OF SYSTEMS:   Reviewed with patient. See " HPI, otherwise negative       PHYSICAL EXAMINATION:  Vitals: Temp:  [97.4  F (36.3  C)-98.8  F (37.1  C)] 97.4  F (36.3  C)  Pulse:  [] 92  Resp:  [18-20] 18  BP: (109-141)/(71-98) 141/98  SpO2:  [79 %-100 %] 79 %  General: On examination, the patient is resting comfortably, NAD, awake and alert and oriented to person, place, time, and and general circumstances. He is not native English speaker, but is able to communicate relatively effectively.   SKIN: There is evidence of significant deformity, chronic ulcerations, and discoloration of multiple joints of both hands and feet. Tophaceous gout plaques can been seen globally throughout the hand/foot joints, subcutaneous tissues.   Pulses:  dorsalis pedis and posterior tibial pulse is intact and equal bilaterally  Sensation: intact and equal bilaterally to the distal lower extremities. He states his sensation is to baseline.   Tenderness: Mild to moderate tenderness to his right thumb, exquisite tenderness to his left anterior and lateral ankle.   ROM: Significantly reduced finger/toe ROM due to chronic gouty deposits globally on extremities. Pt has full ankle ROM on the right and no active ROM and cannot tolerate passive ROM to the left ankle.   Motor: Motor strength of the distal extremities is difficult to determine due to severe deformity, but appears to be at baseline, except for left ankle due to severe discomfort.   Contralateral side= Full range of motion, Negative joint instability findings, 5/5 motor groups about the joint, Non-tender.       RADIOGRAPHIC EVALUATION:  Personally reviewed    There has been interval gouty arthritis destruction of the bilateral feet, most notably near complete osseous erosion of the right second MTP and PIP joints, and left first MTP joints when compared to the foot xrays obtained on 3/29/21.       PERTINENT LABS:  Lab Results: personally reviewed.     Lab Results   Component Value Date    WBC 33.1 07/03/2023    HGB 11.0  07/03/2023    HCT 37.4 07/03/2023    MCV 71 07/03/2023     07/03/2023     7/3/23 Right thumb swab GS and aerobic cx: in progress, but + GPC and 2+ WBCs  7/3/23 Blood cx: in progress, but + GPC in pairs and chains on 1st day of incubation     Latest Reference Range & Units 07/03/23 07:36 07/03/23 11:20   Lactic Acid 0.7 - 2.0 mmol/L 2.7 (H) 3.8 (H)   (H): Data is abnormally high    No recent ESR and CRP.       Kimi Onofre PA-C, JUAN PABLO  Date: 7/3/2023  Time: 3:55 PM    CC1:   Lizette Ku MD    CC2:   TOMÁS CARRIZALES      Addendum:  The patient was seen and examined.  I agree with the note above.  The patient has chronic changes from gout.  Specifically, the right thumb has had a draining wound dorsally over the IP joint for about 2 days.  He states that he had a similar episode for his left middle finger that resolved on its own.  He states that his right thumb only has mild pain.  He states that his left ankle is significantly more painful.  The positive cultures from the right thumb swab to confirm septic gouty arthritis of the right thumb IP joint.  I am concerned that the amount of debridement required to adequately treat the infection might compromise the viability of the thumb.  The patient is right-hand dominant, and loss of the right thumb would be functionally very problematic for him.  We will observe him for now, and see how he does with the empiric antibiotics.  If he has increasing drainage, we will proceed with a limited debridement of the right thumb.

## 2023-07-03 NOTE — CONSULTS
[facility]    Infectious Disease Consultation     Date of Admission:  7/3/2023  Date of Consult (When I saw the patient): 07/03/23    Assessment & Plan   Tyler Miller is a 42 year old male who was admitted on 7/3/2023.     Impression:  1. Severe tophaceous gout involving multiple joints: Hand, foot, see photos below  2. Septic arthritis- Secondary bacterial infection with gram-positive cocci  3. Severe leukocytosis, white count 33 K  4. Worsening pain, drainage over the last 3 days  5. History of hypertension and anemia.  Patient is on colchicine and Uloric prior to admission for gout                    Recommendations    1. Ceftriaxone and vancomycin, follow culture results  2. De-escalate antibiotics based on final culture results  3. Follow blood cultures, CBC, CMP  4. Orthopedic consulted for I&D  5. Patient remain hospitalized at least for 3 to 4 days depending on blood culture results, clinical response  6. May need IV antibiotics on discharge  7. Multiple skin lesions, would need dermatology as an outpatient  8. Thank you for consulting infectious disease.  Patient seen in ED with the help of [facility] .  Updated patient patient's wife and ED nurse    Kristyn lBack MD  [facility]  Answering Service: [phone]  On-Call ID provider: [phone], option: 9        Reason for Consult   Reason for consult: I was asked to evaluate this patient for Patient with extensive gout with tophi, now concern for septic joint.  Please eval for antibiotics    Primary Care Physician   Breanna Carlson    Chief Complaint   Right finger swelling, foot swelling, severe pain    History is obtained from the patient and medical records    History of Present Illness   Tyler Miller is a 42 year old male who presents with history of severe tophaceous gout over the last 3 to 4 years, worsening over the last 3 days with flare, drainage from thumb, finger, as well as toe  Patient is in  "severe pain at the time of evaluation asking for additional pain medications  He has been on medications prior to admission including colchicine and Uloric  Presentation to the hospital, slight improvement though continues to have pain and drainage and swelling  Orthopedic consulted      Past Medical History   I have reviewed this patient's medical history and updated it with pertinent information if needed.   Patient Active Problem List   Diagnosis     Benign essential hypertension     Chronic gouty arthritis     Chronic tophaceous gout     Elevated blood uric acid level     Polyarthralgia     Bone erosion     Cellulitis of finger of right hand     Acute idiopathic gout of left ankle     Acute idiopathic gout of right hand       Past Surgical History   I have reviewed this patient's surgical history and updated it with pertinent information if needed.  No past surgical history on file.    Prior to Admission Medications   Prior to Admission Medications   Prescriptions Last Dose Informant Patient Reported? Taking?   colchicine (COLCRYS) 0.6 MG tablet 7/2/2023 at AM  Yes Yes   Sig: Take 0.6 mg by mouth 2 times daily as needed for gout pain   febuxostat (ULORIC) 40 MG TABS tablet 7/2/2023 at am  Yes Yes   Sig: Take 40 mg by mouth daily      Facility-Administered Medications: None     Allergies   Allergies   Allergen Reactions     Allopurinol      \"Itching throughout body\"       Immunization History   Immunization History   Administered Date(s) Administered     COVID-19 Monovalent 18+ (Moderna) 05/03/2021     DT (PEDS <7y) 01/16/2006     Hepatitis B (Peds <19Y) 11/18/2005     Hepatitis B, Adult 10/22/2004, 12/04/2004     Influenza (IIV3) PF 11/18/2005     MMR 10/22/2004, 12/04/2004     Poliovirus, inactivated (IPV) 12/04/2004     TD,PF 7+ (Tenivac) 01/16/2006     TDAP (Adacel,Boostrix) 06/29/2021     Td (Adult), Adsorbed 05/28/2004, 10/22/2004     Varicella 05/28/2004, 11/18/2005       Social History   I have reviewed " this patient's social history and updated it with pertinent information if needed. Tyler Miller  reports that he has never smoked. He does not have any smokeless tobacco history on file. He reports that he does not drink alcohol and does not use drugs.    Family History   I have reviewed this patient's family history and updated it with pertinent information if needed.   No family history on file.    Review of Systems   The 10 point Review of Systems is negative other than noted in the HPI or here.     Physical Exam   Temp: 98.8  F (37.1  C) Temp src: Oral BP: 127/88 Pulse: 91   Resp: 18 SpO2: 99 % O2 Device: None (Room air)    Vital Signs with Ranges  Temp:  [97.8  F (36.6  C)-98.8  F (37.1  C)] 98.8  F (37.1  C)  Pulse:  [] 91  Resp:  [18-20] 18  BP: (109-127)/(71-89) 127/88  SpO2:  [98 %-100 %] 99 %  180 lbs 0 oz  Body mass index is 30.9 kg/m .    GENERAL APPEARANCE:  awake, mild distress at time of movement  EYES: Eyes grossly normal to inspection, conjunctivae and sclerae normal  HENT: mouth without ulcers or lesions  NECK: supple  RESP: normal breathing pattern, clear to auscultation  CV: regular rates and rhythm, S1 S2  LYMPHATICS: Notable toes finger swelling  ABDOMEN: soft, nontender, nondistended  MS: extremities normal-patient is gout gross deformities noted-see above  SKIN: Lesions, open wounds on right thumb, fingers, left middle finger edematous, multiple toes with gout and ulceration, tophaceous gout of multiple extremities  Papular brown discolored lesions on thighs, per patient has been there for several months  NEURO: coherent      LABORATORY DATA:  Reviewed  WBC elevated at 33  Creatinine elevated  Wound cultures with gram-positive cocci    CBC RESULTS:   Recent Labs   Lab Test 07/03/23  0736   WBC 33.1*   RBC 5.25   HGB 11.0*   HCT 37.4*   MCV 71*   MCH 21.0*   MCHC 29.4*   RDW 17.4*           Last Comprehensive Metabolic Panel:  Sodium   Date Value Ref Range Status   07/03/2023 133  (L) 136 - 145 mmol/L Final     Potassium   Date Value Ref Range Status   07/03/2023 3.6 3.4 - 5.3 mmol/L Final   03/29/2021 4.1 3.5 - 5.0 mmol/L Final     Chloride   Date Value Ref Range Status   07/03/2023 99 98 - 107 mmol/L Final   03/29/2021 107 98 - 107 mmol/L Final     Carbon Dioxide (CO2)   Date Value Ref Range Status   07/03/2023 19 (L) 22 - 29 mmol/L Final   03/29/2021 20 (L) 22 - 31 mmol/L Final     Anion Gap   Date Value Ref Range Status   07/03/2023 15 7 - 15 mmol/L Final   03/29/2021 13 5 - 18 mmol/L Final     Glucose   Date Value Ref Range Status   07/03/2023 136 (H) 70 - 99 mg/dL Final   03/29/2021 98 70 - 125 mg/dL Final     Urea Nitrogen   Date Value Ref Range Status   07/03/2023 17.4 6.0 - 20.0 mg/dL Final   03/29/2021 17 8 - 22 mg/dL Final     Creatinine   Date Value Ref Range Status   07/03/2023 1.13 0.67 - 1.17 mg/dL Final     GFR Estimate   Date Value Ref Range Status   07/03/2023 84 >60 mL/min/1.73m2 Final   03/29/2021 >60 >60 mL/min/1.73m2 Final     Calcium   Date Value Ref Range Status   07/03/2023 9.0 8.6 - 10.0 mg/dL Final     Bilirubin Total   Date Value Ref Range Status   07/03/2023 1.8 (H) <=1.2 mg/dL Final     Alkaline Phosphatase   Date Value Ref Range Status   07/03/2023 314 (H) 40 - 129 U/L Final     ALT   Date Value Ref Range Status   07/03/2023 85 (H) 0 - 70 U/L Final     Comment:     Reference intervals for this test were updated on 6/12/2023 to more accurately reflect our healthy population. There may be differences in the flagging of prior results with similar values performed with this method. Interpretation of those prior results can be made in the context of the updated reference intervals.       AST   Date Value Ref Range Status   07/03/2023 58 (H) 0 - 45 U/L Final     Comment:     Reference intervals for this test were updated on 6/12/2023 to more accurately reflect our healthy population. There may be differences in the flagging of prior results with similar values  performed with this method. Interpretation of those prior results can be made in the context of the updated reference intervals.             CRP   Date Value Ref Range Status   03/29/2021 0.4 0.0 - 0.8 mg/dL Final            MICROBIOLOGY:    Reviewed    Blood cultures  Other Micro:    RADIOLOGY:    XR Finger Right G/E 2 Views    Result Date: 7/3/2023  EXAM: XR FINGER RIGHT G/E 2 VIEWS LOCATION: [facility] DATE: 7/3/2023 INDICATION: right thumb pain, h o gout COMPARISON: 03/29/2021     IMPRESSION: Marked soft tissue swelling of the proximal soft tissues of the thumb with periarticular osteopenia which likely reflect the sequela of patient's known gout. There is slight subluxation of the distal phalanx of the thumb relative to the proximal phalanx, though no evidence of a displaced fracture or dislocation.     XR Ankle Left G/E 3 Views    Result Date: 7/3/2023  EXAM: XR ANKLE LEFT G/E 3 VIEWS LOCATION: [facility] DATE: 7/3/2023 INDICATION: left ankle pain, h o gout COMPARISON: None.     IMPRESSION: No acute fracture or erosion. No foreign body.

## 2023-07-03 NOTE — PROGRESS NOTES
Called report to Natacha GARCIA on P1 prior to transfer to room 124 via stretcher. Family at bedside.

## 2023-07-03 NOTE — ED PROVIDER NOTES
EMERGENCY DEPARTMENT ENCOUNTER      NAME: Tyler Miller  AGE: 42 year old male  YOB: 1980  MRN: [id number]  EVALUATION DATE & TIME: 7/3/2023  1:15 AM    PCP: Noe Lauren    ED PROVIDER: Shannan Cazares MD      Chief Complaint   Patient presents with     right thumb infection/gout         FINAL IMPRESSION:  1. Acute idiopathic gout of left ankle    2. Acute idiopathic gout of right hand    3. Cellulitis of finger of right hand          ED COURSE & MEDICAL DECISION MAKING:    Pertinent Labs & Imaging studies reviewed. (See chart for details)  42 year old male presents to the Emergency Department for evaluation of right thumb pain and left ankle pain.  Patient with history of chronic tophaceous gout, currently on colchicine and uloric.  Patient reports that he has not been on prednisone over the last 2 years.  He denies any constitutional symptoms.  He states that the appearance of his current joints is common for acute gout flares.  On evaluation of his right thumb, there is erythema, significant soft tissue edema and slight whitish discharge from an opening in the wound.  This is concerning for infection, though he states that this is typical of his gout flares.  CRP is elevated at 182.3, WBC is 20.3.  In the setting of clinical concern for acute infection, increase in laboratory inflammatory and infectious markers, the patient will be admitted for IV antibiotics to observe to see if it does improve.  This certainly may be a typical acute gout flare.  Patient was given prednisone and pain was treated with improvement in his pain.    1:25 AM I met with the patient to gather history and perform my exam. ED course and treatment discussed.   1:34 AM I swabbed the patient's thumb for infection. Patient notes he has never needed to be seen in the ER for his gout before   2:19 AM I updated the patient and his family with lab and imaging results. The patient's family reports that the patient has not been  on steroids in the last couple years.  Patient's leukocytosis likely more infectious in etiology given no recent steroids.  Discussed plan for admission for IV antibiotics and continued observation for gout exacerbation versus gout exacerbation with associated infection.  2:43 AM I discussed the patient with Dr. Ku from the hospitalist service who agrees to admit the patient.      At the conclusion of the encounter I discussed the results of all of the tests and the disposition. The questions were answered. The patient or family acknowledged understanding and was agreeable with the care plan.       Medical Decision Making    History:    Supplemental history from: Documented in chart, if applicable and Family Member/Significant Other    External Record(s) reviewed: Documented in chart, if applicable.    Work Up:    Chart documentation includes differential considered and any EKGs or imaging independently interpreted by provider, where specified.    In additional to work up documented, I considered the following work up: Documented in chart, if applicable.    External consultation:    Discussion of management with another provider: Documented in chart, if applicable    Complicating factors:    Care impacted by chronic illness: Hypertension and Other: Gout    Care affected by social determinants of health: Access to Medical Care    Disposition considerations: Admit.               MEDICATIONS GIVEN IN THE EMERGENCY:  Medications   cefTRIAXone (ROCEPHIN) 1 g vial to attach to  mL bag for ADULTS or NS 50 mL bag for PEDS (has no administration in time range)   HYDROmorphone (PF) (DILAUDID) injection 0.5 mg (0.5 mg Intravenous $Given 7/3/23 0145)       NEW PRESCRIPTIONS STARTED AT TODAY'S ER VISIT  New Prescriptions    No medications on file          =================================================================    HPI    Patient information was obtained from: Patient and patient's sons     Use of :  "Yes (In Person) Language: Lloyd Miller is a 42 year old male with a pertinent history of chronic gouty arthritis, bone erosion, HTN who presents to this ED via [facility] for evaluation of joint pain and swelling    The patient's sons report that the patient is having \"severe pain tonight\" in his left ankle and right thumb. They note that the patient's joints have been swollen \"for awhile\", but tonight the pain became \"unbearable\" to the point that the patient couldn't get up to use the bathroom. They report that the patient's right thumb has been red and swollen for a few days, but recently started to have purulent drainage. They note that the patient's thumb normally gets red with gout flares.   The patient reports he has been taking Colchicine and Febuxostat daily for his gout. The patient notes he normally has pain from gout, but it is never this severe.   The patient denies fever, chills, or diaphoresis.   The patient endorses swelling and deformity, due to gout, to bilateral feet and right hand, notably right thumb.    The patient denies a history of diabetes.     PAST MEDICAL HISTORY:  No past medical history on file.    PAST SURGICAL HISTORY:  No past surgical history on file.        CURRENT MEDICATIONS:    allopurinol (ZYLOPRIM) 300 MG tablet  amLODIPine (NORVASC) 5 MG tablet  bacitracin 500 unit/gram ointment  doxycycline (MONODOX) 100 MG capsule  oxyCODONE (ROXICODONE) 5 MG immediate release tablet  predniSONE (DELTASONE) 2.5 MG tablet        ALLERGIES:  No Known Allergies    FAMILY HISTORY:  No family history on file.    SOCIAL HISTORY:   Social History     Socioeconomic History     Marital status:    Tobacco Use     Smoking status: Never   Substance and Sexual Activity     Alcohol use: Never     Drug use: Never       VITALS:  /77   Pulse 110   Temp 97.8  F (36.6  C) (Temporal)   Resp 20   Ht 1.626 m (5' 4\")   Wt 81.6 kg (180 lb)   SpO2 100%   BMI 30.90 kg/m      PHYSICAL " EXAM    Constitutional: Well developed, Well nourished, NAD  HENT: Normocephalic, Atraumatic, Bilateral external ears normal, Oropharynx normal, mucous membranes moist, Nose normal.   Neck- Normal range of motion, No tenderness, Supple, No stridor.  Eyes: PERRL, EOMI, Conjunctiva normal, No discharge.   Respiratory: Normal breath sounds, No respiratory distress  Cardiovascular: Normal heart rate, Regular rhythm  GI: Bowel sounds normal, Soft, No tenderness,   Musculoskeletal: Chronic tophaceous gouty deposits with involvement of his feet bilaterally and hands bilaterally with erythematous, edematous right IP joint of the thumb with scant amount of whitish discharge expressed        Integument: Warm, Dry  Neurologic: Alert & oriented x 3, Normal motor function, Normal sensory function, No focal deficits noted. Normal gait.   Psychiatric: Affect normal, Judgment normal, Mood normal.      LAB:  All pertinent labs reviewed and interpreted.  Results for orders placed or performed during the hospital encounter of 07/03/23   XR Ankle Left G/E 3 Views    Impression    IMPRESSION: No acute fracture or erosion. No foreign body.   XR Finger Right G/E 2 Views    Impression    IMPRESSION: Marked soft tissue swelling of the proximal soft tissues of the thumb with periarticular osteopenia which likely reflect the sequela of patient's known gout. There is slight subluxation of the distal phalanx of the thumb relative to the   proximal phalanx, though no evidence of a displaced fracture or dislocation.     Basic metabolic panel   Result Value Ref Range    Sodium 133 (L) 136 - 145 mmol/L    Potassium 3.4 3.4 - 5.3 mmol/L    Chloride 96 (L) 98 - 107 mmol/L    Carbon Dioxide (CO2) 20 (L) 22 - 29 mmol/L    Anion Gap 17 (H) 7 - 15 mmol/L    Urea Nitrogen 13.9 6.0 - 20.0 mg/dL    Creatinine 0.97 0.67 - 1.17 mg/dL    Calcium 9.7 8.6 - 10.0 mg/dL    Glucose 132 (H) 70 - 99 mg/dL    GFR Estimate >90 >60 mL/min/1.73m2   Result Value Ref Range     CRP Inflammation 182.30 (H) <5.00 mg/L   CBC with platelets and differential   Result Value Ref Range    WBC Count 20.3 (H) 4.0 - 11.0 10e3/uL    RBC Count 5.36 4.40 - 5.90 10e6/uL    Hemoglobin 11.3 (L) 13.3 - 17.7 g/dL    Hematocrit 37.1 (L) 40.0 - 53.0 %    MCV 69 (L) 78 - 100 fL    MCH 21.1 (L) 26.5 - 33.0 pg    MCHC 30.5 (L) 31.5 - 36.5 g/dL    RDW 17.7 (H) 10.0 - 15.0 %    Platelet Count 439 150 - 450 10e3/uL    % Neutrophils 93 %    % Lymphocytes 3 %    % Monocytes 2 %    % Eosinophils 0 %    % Basophils 0 %    % Immature Granulocytes 2 %    NRBCs per 100 WBC 0 <1 /100    Absolute Neutrophils 19.1 (H) 1.6 - 8.3 10e3/uL    Absolute Lymphocytes 0.5 (L) 0.8 - 5.3 10e3/uL    Absolute Monocytes 0.3 0.0 - 1.3 10e3/uL    Absolute Eosinophils 0.0 0.0 - 0.7 10e3/uL    Absolute Basophils 0.0 0.0 - 0.2 10e3/uL    Absolute Immature Granulocytes 0.4 <=0.4 10e3/uL    Absolute NRBCs 0.0 10e3/uL       RADIOLOGY:  Reviewed all pertinent imaging. Please see official radiology report.  XR Ankle Left G/E 3 Views   Final Result   IMPRESSION: No acute fracture or erosion. No foreign body.      XR Finger Right G/E 2 Views   Final Result   IMPRESSION: Marked soft tissue swelling of the proximal soft tissues of the thumb with periarticular osteopenia which likely reflect the sequela of patient's known gout. There is slight subluxation of the distal phalanx of the thumb relative to the    proximal phalanx, though no evidence of a displaced fracture or dislocation.               I, Andreina Michelle, am serving as a scribe to document services personally performed by Shannan Cazares, based on my observation and the provider's statements to me. I, Shannan Cazares MD, attest that Andreina Michelle is acting in a scribe capacity, has observed my performance of the services and has documented them in accordance with my direction.    Shannan Cazares MD  Emergency Medicine  [facility] EMERGENCY DEPARTMENT  [id number]  [facility] [id number]  [phone]     Shannan Cazares MD  07/03/23 [id number]

## 2023-07-03 NOTE — H&P
"[facility]    History and Physical - Hospitalist Service       Date of Admission:  7/3/2023    Assessment & Plan      Tyler Miller is a 42 year old male admitted on 7/3/2023.       Right finger gouty attack+cellulitis  -vanco+rocephin  -MRSA screen pending  -blood cultures pending  -empiric prednisone 40mg po x 1, followed by 10mg daily  -home allopurinol    Hyponatremia  -monitor    Anemia  -iron/b12/occult pending    HTN  -lower norvasc dose due to labile BP      Diet:  low purine diet  DVT Prophylaxis: Pneumatic Compression Devices  Senior Catheter: Not present  Lines: None     Cardiac Monitoring: None  Code Status:   full    Clinically Significant Risk Factors Present on Admission                  # Hypertension: Noted on problem list      # Obesity: Estimated body mass index is 30.9 kg/m  as calculated from the following:    Height as of this encounter: 1.626 m (5' 4\").    Weight as of this encounter: 81.6 kg (180 lb).            Disposition Plan      Expected Discharge Date: 07/05/2023                  Lizette Ku MD  Hospitalist Service  [facility]  Securely message with [employer] (more info)  Text page via [facility] Paging/Directory     ______________________________________________________________________    Chief Complaint   Right finger and left ankle swelling and pain    History of Present Illness   Tyler Miller is a 42 year old male with a pertinent history of chronic gouty arthritis, bone erosion, HTN who presents to this ED via [facility] for evaluation of joint pain and swelling     The patient's sons report that the patient is having \"severe pain tonight\" in his left ankle and right thumb. They note that the patient's joints have been swollen \"for awhile\", but tonight the pain became \"unbearable\" to the point that the patient couldn't get up to use the bathroom. They report that the patient's right thumb has been red and swollen for a few days, but recently " started to have purulent drainage. They note that the patient's thumb normally gets red with gout flares.   The patient reports he has been taking Colchicine and Febuxostat daily for his gout. The patient notes he normally has pain from gout, but it is never this severe.   The patient denies fever, chills, or diaphoresis.   The patient endorses swelling and deformity, due to gout, to bilateral feet and right hand, notably right thumb.     The patient denies a history of diabetes.     Past Medical History    No past medical history on file.    Past Surgical History   No past surgical history on file.    Prior to Admission Medications   Prior to Admission Medications   Prescriptions Last Dose Informant Patient Reported? Taking?   allopurinol (ZYLOPRIM) 300 MG tablet   No No   Sig: Take 1 tablet (300 mg) by mouth daily   amLODIPine (NORVASC) 5 MG tablet   No No   Sig: [AMLODIPINE (NORVASC) 5 MG TABLET] Take 1 tablet (5 mg total) by mouth daily.   bacitracin 500 unit/gram ointment   No No   Sig: [BACITRACIN 500 UNIT/GRAM OINTMENT] Apply topically daily.   doxycycline (MONODOX) 100 MG capsule   No No   Sig: [DOXYCYCLINE (MONODOX) 100 MG CAPSULE] Take 1 capsule (100 mg total) by mouth 2 (two) times a day.   oxyCODONE (ROXICODONE) 5 MG immediate release tablet   No No   Sig: [OXYCODONE (ROXICODONE) 5 MG IMMEDIATE RELEASE TABLET] Take 1 tablet (5 mg total) by mouth every 4 (four) hours as needed for pain.   predniSONE (DELTASONE) 2.5 MG tablet   No No   Si.5 mg daily for 4 weeks, reduce by 2.5 mg every 4 weeks to 5 milligrams daily, and to 2.5 mg daily and stop      Facility-Administered Medications: None        Review of Systems    The 10 point Review of Systems is negative other than noted in the HPI or here.      Physical Exam   Vital Signs: Temp: 97.8  F (36.6  C) Temp src: Temporal BP: 127/77 Pulse: 110   Resp: 20 SpO2: 100 % O2 Device: None (Room air)    Weight: 180 lbs 0 oz    Constitutional: Awake, alert,  cooperative, no apparent distress.  Eyes: Conjunctiva and pupils examined and normal.  HEENT: Moist mucous membranes, normal dentition.  Respiratory: Clear to auscultation bilaterally, no crackles or wheezing.  Cardiovascular: Regular rate and rhythm, normal S1 and S2, and no murmur noted.  GI: Soft, non-distended, non-tender, normal bowel sounds.  Lymph/Hematologic: No anterior cervical or supraclavicular adenopathy.  Skin: gouty tophies on right thumb and left foot, macerations on the right thumb with serous drainage.  Musculoskeletal: No joint swelling, erythema or tenderness.  Neurologic: Cranial nerves 2-12 intact, normal strength and sensation.  Psychiatric: Alert, oriented to person, place and time, no obvious anxiety or depression.    Medical Decision Making       75 MINUTES SPENT BY ME on the date of service doing chart review, history, exam, documentation & further activities per the note.      Data     I have personally reviewed the following data over the past 24 hrs:    20.3 (H)  \   11.3 (L)   / 439     133 (L) 96 (L) 13.9 /  132 (H)   3.4 20 (L) 0.97 \       Procal: N/A CRP: 182.30 (H) Lactic Acid: N/A            Left ankle x ray  No acute fracture or erosion. No foreign body.    Right finger x ray  Marked soft tissue swelling of the proximal soft tissues of the thumb with periarticular osteopenia which likely reflect the sequela of patient's known gout. There is slight subluxation of the distal phalanx of the thumb relative to the   proximal phalanx, though no evidence of a displaced fracture or dislocation.

## 2023-07-03 NOTE — MEDICATION SCRIBE - ADMISSION MEDICATION HISTORY
Medication Scribe Admission Medication History    Admission medication history is complete. The information provided in this note is only as accurate as the sources available at the time of the update.    Medication reconciliation/reorder completed by provider prior to medication history? No    Information Source(s): Patient via in-person    Pertinent Information: PATIENT STATES HE ONLY TAKES TWO MEDICATIONS RIGHT NOW, WHICH ARE THE TWO FEBUXOSTAT, COLCHICINE     Changes made to PTA medication list:    Added: FEBUXOSTAT, COLCHICINE     Deleted: None    Changed: None    Medication Affordability:  Not including over the counter (OTC) medications, was there a time in the past 3 months when you did not take your medications as prescribed because of cost?: No    Allergies reviewed with patient and updates made in EHR: yes    Medication History Completed By: Quinton Ellis 7/3/2023 7:09 AM    Prior to Admission medications    Medication Sig Last Dose Taking? Auth Provider Long Term End Date   allopurinol (ZYLOPRIM) 300 MG tablet Take 1 tablet (300 mg) by mouth daily   Jose Raul Schulz MBBS Yes 10/18/21   amLODIPine (NORVASC) 5 MG tablet [AMLODIPINE (NORVASC) 5 MG TABLET] Take 1 tablet (5 mg total) by mouth daily.   Lupis Guallpa MD Yes    bacitracin 500 unit/gram ointment [BACITRACIN 500 UNIT/GRAM OINTMENT] Apply topically daily.   Hong Nunez MD     doxycycline (MONODOX) 100 MG capsule [DOXYCYCLINE (MONODOX) 100 MG CAPSULE] Take 1 capsule (100 mg total) by mouth 2 (two) times a day.   Lupis Guallpa MD     oxyCODONE (ROXICODONE) 5 MG immediate release tablet [OXYCODONE (ROXICODONE) 5 MG IMMEDIATE RELEASE TABLET] Take 1 tablet (5 mg total) by mouth every 4 (four) hours as needed for pain.   Hong Nunez MD     predniSONE (DELTASONE) 2.5 MG tablet 7.5 mg daily for 4 weeks, reduce by 2.5 mg every 4 weeks to 5 milligrams daily, and to 2.5 mg daily and stop   Jose Raul Schulz MBBS

## 2023-07-03 NOTE — PHARMACY-VANCOMYCIN DOSING SERVICE
Pharmacy Vancomycin Initial Note  Date of Service July 3, 2023  Patient's  1980  42 year old, male  Indication: Skin and Soft Tissue Infection vs gout attack  Current estimated CrCl = Estimated Creatinine Clearance: 97.7 mL/min (based on SCr of 0.97 mg/dL).  Creatinine for last 3 days  7/3/2023:  1:42 AM Creatinine 0.97 mg/dL  [employer] Prediction of Planned Initial Vancomycin Regimen  Loading dose: 1750 mg at 04:00 2023.  Regimen: 1000 mg IV every 12 hours.  Start time: 03:13 on 2023  Exposure target: AUC24 (range)400-600 mg/L.hr   AUC24,ss: 452 mg/L.hr  Probability of AUC24 > 400: 63 %  Ctrough,ss: 13.8 mg/L  Probability of Ctrough,ss > 20: 22 %  Probability of nephrotoxicity (Lodise YAMILE ): 9 %        Plan:  1. Start vancomycin 1000 mg IV q12h. (~14.7mg/kg adjusted wt)   2. Vancomycin monitoring method: AUC  3. Vancomycin therapeutic monitoring goal: 400-600 mg*h/L  4. Pharmacy will check vancomycin levels as appropriate in 1-3 Days.    5. Serum creatinine levels will be ordered daily for the first week of therapy and at least twice weekly for subsequent weeks.      Harika Rojo, [facility]

## 2023-07-03 NOTE — PLAN OF CARE
Goal Outcome Evaluation:    Problem: Plan of Care - These are the overarching goals to be used throughout the patient stay.    Goal: Optimal Comfort and Wellbeing  Outcome: Progressing  Pt A&Ox4, made needs known, able to sleep on and off at times NOC w/ even unlabored respirations. Professional [employer]  used with all interactions. Oxycodone most effective for pain control, pt only states pain level when asked, pain went from 10/10 to 8/10 with one dose of 5mg oxycodone. VSS, on RA, denied CP and SOB. No noted drainage from R thumb, IV abx as ordered. Urinal in bed as needed, pt safe NOC.

## 2023-07-03 NOTE — PLAN OF CARE
Gout pain unchanged with IV dilaudid, oral oxycodone, tylenol. States dangling his left foot off of bed helps some.  Multiple wounds on gout areas on feet and hand.  No areas draining, no fevers.  Eating and drinking well. Wife here earlier, supportive.  Does not weight bear- turns self in bed. Marie Lima RN    Problem: Infection  Goal: Absence of Infection Signs and Symptoms  Outcome: Progressing  Goal: Optimal Comfort and Wellbeing  Outcome: Progressing  Goal: Absence of Hospital-Acquired Illness or Injury  Intervention: Prevent Skin Injury

## 2023-07-04 ENCOUNTER — APPOINTMENT (OUTPATIENT)
Dept: ULTRASOUND IMAGING | Facility: HOSPITAL | Age: 43
End: 2023-07-04
Payer: COMMERCIAL

## 2023-07-04 ENCOUNTER — ANESTHESIA EVENT (OUTPATIENT)
Dept: SURGERY | Facility: HOSPITAL | Age: 43
End: 2023-07-04
Payer: COMMERCIAL

## 2023-07-04 LAB
ALBUMIN SERPL BCG-MCNC: 2.8 G/DL (ref 3.5–5.2)
ALP SERPL-CCNC: 234 U/L (ref 40–129)
ALT SERPL W P-5'-P-CCNC: 52 U/L (ref 0–70)
ANION GAP SERPL CALCULATED.3IONS-SCNC: 12 MMOL/L (ref 7–15)
APPEARANCE FLD: ABNORMAL
AST SERPL W P-5'-P-CCNC: 27 U/L (ref 0–45)
BASOPHILS # BLD AUTO: 0 10E3/UL (ref 0–0.2)
BASOPHILS NFR BLD AUTO: 0 %
BILIRUB SERPL-MCNC: 1.3 MG/DL
BUN SERPL-MCNC: 16.6 MG/DL (ref 6–20)
CALCIUM SERPL-MCNC: 8.7 MG/DL (ref 8.6–10)
CELL COUNT BODY FLUID SOURCE: ABNORMAL
CHLORIDE SERPL-SCNC: 101 MMOL/L (ref 98–107)
COLOR FLD: ABNORMAL
CREAT SERPL-MCNC: 1.02 MG/DL (ref 0.67–1.17)
CRYSTALS SNV MICRO: ABNORMAL
DEPRECATED HCO3 PLAS-SCNC: 19 MMOL/L (ref 22–29)
EOSINOPHIL # BLD AUTO: 0 10E3/UL (ref 0–0.7)
EOSINOPHIL NFR BLD AUTO: 0 %
ERYTHROCYTE [DISTWIDTH] IN BLOOD BY AUTOMATED COUNT: 17.6 % (ref 10–15)
GFR SERPL CREATININE-BSD FRML MDRD: >90 ML/MIN/1.73M2
GLUCOSE SERPL-MCNC: 162 MG/DL (ref 70–99)
GRAM STAIN RESULT: ABNORMAL
GRAM STAIN RESULT: ABNORMAL
HCT VFR BLD AUTO: 33.4 % (ref 40–53)
HGB BLD-MCNC: 10.2 G/DL (ref 13.3–17.7)
IMM GRANULOCYTES # BLD: 0.1 10E3/UL
IMM GRANULOCYTES NFR BLD: 1 %
LYMPHOCYTES # BLD AUTO: 0.7 10E3/UL (ref 0.8–5.3)
LYMPHOCYTES NFR BLD AUTO: 6 %
LYMPHOCYTES NFR FLD MANUAL: NORMAL %
MCH RBC QN AUTO: 21.4 PG (ref 26.5–33)
MCHC RBC AUTO-ENTMCNC: 30.5 G/DL (ref 31.5–36.5)
MCV RBC AUTO: 70 FL (ref 78–100)
MONOCYTES # BLD AUTO: 0.5 10E3/UL (ref 0–1.3)
MONOCYTES NFR BLD AUTO: 4 %
MONOS+MACROS NFR FLD MANUAL: NORMAL %
NEUTROPHILS # BLD AUTO: 11.4 10E3/UL (ref 1.6–8.3)
NEUTROPHILS NFR BLD AUTO: 89 %
NEUTS BAND NFR FLD MANUAL: NORMAL %
NRBC # BLD AUTO: 0 10E3/UL
NRBC BLD AUTO-RTO: 0 /100
PLATELET # BLD AUTO: 258 10E3/UL (ref 150–450)
POTASSIUM SERPL-SCNC: 3.8 MMOL/L (ref 3.4–5.3)
PROT SERPL-MCNC: 6.9 G/DL (ref 6.4–8.3)
RBC # BLD AUTO: 4.77 10E6/UL (ref 4.4–5.9)
SODIUM SERPL-SCNC: 132 MMOL/L (ref 136–145)
WBC # BLD AUTO: 12.7 10E3/UL (ref 4–11)
WBC # FLD AUTO: ABNORMAL /UL

## 2023-07-04 PROCEDURE — 120N000001 HC R&B MED SURG/OB

## 2023-07-04 PROCEDURE — 250N000011 HC RX IP 250 OP 636: Mod: JZ

## 2023-07-04 PROCEDURE — 85025 COMPLETE CBC W/AUTO DIFF WBC: CPT | Performed by: INTERNAL MEDICINE

## 2023-07-04 PROCEDURE — 87070 CULTURE OTHR SPECIMN AEROBIC: CPT

## 2023-07-04 PROCEDURE — 87205 SMEAR GRAM STAIN: CPT

## 2023-07-04 PROCEDURE — 250N000013 HC RX MED GY IP 250 OP 250 PS 637: Performed by: INTERNAL MEDICINE

## 2023-07-04 PROCEDURE — 272N000710 US JOINT INJECTION ASPIRATION MAJOR LEFT

## 2023-07-04 PROCEDURE — 99232 SBSQ HOSP IP/OBS MODERATE 35: CPT | Performed by: INTERNAL MEDICINE

## 2023-07-04 PROCEDURE — 80053 COMPREHEN METABOLIC PANEL: CPT | Performed by: INTERNAL MEDICINE

## 2023-07-04 PROCEDURE — 20611 DRAIN/INJ JOINT/BURSA W/US: CPT | Mod: LT

## 2023-07-04 PROCEDURE — 250N000013 HC RX MED GY IP 250 OP 250 PS 637: Performed by: HOSPITALIST

## 2023-07-04 PROCEDURE — 36415 COLL VENOUS BLD VENIPUNCTURE: CPT | Performed by: INTERNAL MEDICINE

## 2023-07-04 PROCEDURE — 87077 CULTURE AEROBIC IDENTIFY: CPT

## 2023-07-04 PROCEDURE — 258N000003 HC RX IP 258 OP 636

## 2023-07-04 PROCEDURE — 87075 CULTR BACTERIA EXCEPT BLOOD: CPT

## 2023-07-04 PROCEDURE — 89060 EXAM SYNOVIAL FLUID CRYSTALS: CPT

## 2023-07-04 PROCEDURE — 250N000011 HC RX IP 250 OP 636: Mod: JZ | Performed by: HOSPITALIST

## 2023-07-04 PROCEDURE — 89051 BODY FLUID CELL COUNT: CPT

## 2023-07-04 PROCEDURE — 250N000011 HC RX IP 250 OP 636: Mod: JZ | Performed by: INTERNAL MEDICINE

## 2023-07-04 PROCEDURE — 99233 SBSQ HOSP IP/OBS HIGH 50: CPT

## 2023-07-04 RX ORDER — POTASSIUM CHLORIDE 1500 MG/1
20 TABLET, EXTENDED RELEASE ORAL ONCE
Status: COMPLETED | OUTPATIENT
Start: 2023-07-04 | End: 2023-07-04

## 2023-07-04 RX ORDER — SODIUM CHLORIDE AND POTASSIUM CHLORIDE 150; 900 MG/100ML; MG/100ML
INJECTION, SOLUTION INTRAVENOUS CONTINUOUS
Status: DISCONTINUED | OUTPATIENT
Start: 2023-07-04 | End: 2023-07-05

## 2023-07-04 RX ORDER — ENOXAPARIN SODIUM 100 MG/ML
40 INJECTION SUBCUTANEOUS EVERY 24 HOURS
Status: DISCONTINUED | OUTPATIENT
Start: 2023-07-04 | End: 2023-07-05

## 2023-07-04 RX ORDER — SODIUM CHLORIDE 9 MG/ML
INJECTION, SOLUTION INTRAVENOUS CONTINUOUS
Status: DISCONTINUED | OUTPATIENT
Start: 2023-07-04 | End: 2023-07-04

## 2023-07-04 RX ADMIN — INDOMETHACIN 50 MG: 25 CAPSULE ORAL at 09:58

## 2023-07-04 RX ADMIN — POTASSIUM CHLORIDE 20 MEQ: 1500 TABLET, EXTENDED RELEASE ORAL at 07:53

## 2023-07-04 RX ADMIN — COLCHICINE 0.6 MG: 0.6 TABLET ORAL at 19:25

## 2023-07-04 RX ADMIN — INDOMETHACIN 50 MG: 25 CAPSULE ORAL at 17:44

## 2023-07-04 RX ADMIN — PANTOPRAZOLE SODIUM 40 MG: 40 TABLET, DELAYED RELEASE ORAL at 07:53

## 2023-07-04 RX ADMIN — OXYCODONE HYDROCHLORIDE 5 MG: 5 TABLET ORAL at 05:34

## 2023-07-04 RX ADMIN — ENOXAPARIN SODIUM 40 MG: 40 INJECTION SUBCUTANEOUS at 11:03

## 2023-07-04 RX ADMIN — DEXTROSE MONOHYDRATE 900 MG: 50 INJECTION, SOLUTION INTRAVENOUS at 20:47

## 2023-07-04 RX ADMIN — CEFTRIAXONE SODIUM 2 G: 2 INJECTION, POWDER, FOR SOLUTION INTRAMUSCULAR; INTRAVENOUS at 21:53

## 2023-07-04 RX ADMIN — VANCOMYCIN HYDROCHLORIDE 1000 MG: 1 INJECTION, SOLUTION INTRAVENOUS at 04:49

## 2023-07-04 RX ADMIN — COLCHICINE 0.6 MG: 0.6 TABLET ORAL at 07:53

## 2023-07-04 RX ADMIN — ACETAMINOPHEN 650 MG: 325 TABLET ORAL at 16:26

## 2023-07-04 RX ADMIN — ACETAMINOPHEN 650 MG: 325 TABLET ORAL at 07:53

## 2023-07-04 RX ADMIN — DEXTROSE MONOHYDRATE 900 MG: 50 INJECTION, SOLUTION INTRAVENOUS at 12:43

## 2023-07-04 RX ADMIN — POTASSIUM CHLORIDE AND SODIUM CHLORIDE: 900; 150 INJECTION, SOLUTION INTRAVENOUS at 11:03

## 2023-07-04 RX ADMIN — FEBUXOSTAT 40 MG: 40 TABLET, FILM COATED ORAL at 07:53

## 2023-07-04 ASSESSMENT — ACTIVITIES OF DAILY LIVING (ADL)
DIFFICULTY_COMMUNICATING: NO
FALL_HISTORY_WITHIN_LAST_SIX_MONTHS: NO
ADLS_ACUITY_SCORE: 22
ADLS_ACUITY_SCORE: 33
DIFFICULTY_EATING/SWALLOWING: NO
ADLS_ACUITY_SCORE: 33
DOING_ERRANDS_INDEPENDENTLY_DIFFICULTY: NO
TOILETING_ISSUES: NO
CONCENTRATING,_REMEMBERING_OR_MAKING_DECISIONS_DIFFICULTY: NO
DRESSING/BATHING_DIFFICULTY: NO
ADLS_ACUITY_SCORE: 33
ADLS_ACUITY_SCORE: 22
ADLS_ACUITY_SCORE: 22
WEAR_GLASSES_OR_BLIND: NO
HEARING_DIFFICULTY_OR_DEAF: NO
ADLS_ACUITY_SCORE: 22
WALKING_OR_CLIMBING_STAIRS_DIFFICULTY: YES
ADLS_ACUITY_SCORE: 33
ADLS_ACUITY_SCORE: 22
CHANGE_IN_FUNCTIONAL_STATUS_SINCE_ONSET_OF_CURRENT_ILLNESS/INJURY: NO
ADLS_ACUITY_SCORE: 22
WALKING_OR_CLIMBING_STAIRS: AMBULATION DIFFICULTY, REQUIRES EQUIPMENT

## 2023-07-04 NOTE — PROVIDER NOTIFICATION
Notified Ortho provider Angie Addison of lab results, gram positive cocci in left ankle fluid. She spoke with Dr Perez and got verbal order to have pt NPO and no anticoagulants at midnight. They are going to have a dr that specializes in ankles speak to him tomorrow.     Yuko Hou RN

## 2023-07-04 NOTE — PROGRESS NOTES
"Orthopaedic Surgery Progress Note   7/4/2023    Subjective: No acute events reported overnight.  Patient continues to report significant pain at the left foot, ankle, and to a lesser degree right thumb.  Overall unchanged since admission.  No reported subjective fevers or chills.  He has had diet this morning, reported to be tolerating this well without nausea or vomiting.  No chest pain or shortness of breath.  He reports a longstanding history of gout, including tophaceous changes of both bilateral feet/ankles and hands that been present for at least a year per report. Currently inpatient receiving colchicine, indomethacin, and empiric IV vanco and rocephin. No previous surgery to the left foot or ankle reported.    Objective: /75 (BP Location: Right arm)   Pulse 99   Temp 98.6  F (37  C) (Oral)   Resp 18   Ht 1.626 m (5' 4\")   Wt 81.6 kg (180 lb)   SpO2 98%   BMI 30.90 kg/m      General: NAD, alert and oriented, cooperative with exam, non toxic in appearance.   Cardio: RRR, extremities wwp.   Respiratory: Non-labored breathing.  MSK: Examination of bilateral lower extremities demonstrates extensive chronic changes consistent with tophaceous gout involving toes, midfoot, heels, and ankles.  There is warmth overlying bilateral ankles, left greater than right.  No significant degree of surrounding erythema appreciated.  He has tenderness to palpation over the left ankle globally as well as the midfoot in particular overlying area of tophaceous deposits of the first MTP.  No active or expressible drainage from the ankle or midfoot.  He has pain with both passive and active left ankle motion.  Sensation reported to be intact through the DP, SP, sural, saphenous, tibial nerves effusions bilaterally.  Toes are warm and well-perfused.     Bilateral hands demonstrate multiple joint involvement with gouty tophaceous deposits.  Right thumb demonstrates significant deformity related to tophi at the IP joint.  " There is thin cloudy appearing drainage from the right thumb open lesion and mild degree of surrounding erythema.  Mild tenderness in pain with motion of the thumb.    Labs:  Lab Results   Component Value Date    WBC 12.7 (H) 07/04/2023    HGB 10.2 (L) 07/04/2023     07/04/2023       Imaging:   MRI of the left ankle dated 7/3/2023 demonstrates multifocal extensive large soft tissue masses with underlying osseous erosion seen within the forefoot and hindfoot, most pronounced at the first MTP joint and second metatarsal head as well as the posterior aspect of the ankle joint.  Per radiology these proliferative erosive soft tissue masses more so reflective of large gouty tophi based on appearance.  Per report, no evidence of drainable soft tissue abscess or definitive evidence of osteomyelitis seen.    Assessment and Plan: Tyler Miller is a 42 year old male with:   1.  Severe tophaceous gout involving multiple joints including bilateral hands, bilateral forefoot, midfoot, and hindfoot, with increasing left ankle pain.  Differential includes severe gout exacerbation and progressive of erosive changes, versus superimposed septic joint.  2.  Right thumb severe tophaceous gout with open draining wound, wound swab positive for Streptococcus pyogenes  3.  Strep pyogenes bacteremia    Activity: Up with assist and assistive device until independent  Weight bearing status: WBAT LLE.  Pain management: per medicine, PRN PO and IV medication.   Antibiotics: per ID, currently on ceftriaxone and clindamycin  Diet: NPO at MN pending left ankle aspiration results.   DVT prophylaxis: per medicine, currently on lovenox. Hold AM dose.   Imaging: recommend US guide left ankle aspiration  Labs: Aspiration for cell count, gram stain, crystals, aerobic and anaerobic culture  Cultures: Pending, follow culture results closely.    Consults: ID. Recommend podiatry consultation to help weight in on best management of multiple erosive  midfoot and hindfoot gouty tophaceous deposits    Disposition: TBD. Complex picture regarding extensive, severe tophaceous gout involving multiple joints in the forefoot, midfoot, hindfoot and ankle. Challenging situation to manage from a surgical standpoint.  Will follow aspiration results closely. If results indicate superimposed septic joint on top of gouty flare, may necessitate I&D. Will discuss with my foot and ankle colleagues on best course of action pending aspiration results. Dr. Park following regarding right thumb draining wound, will continue to monitor the situation and determine if limited debridement of the thumb is indicated.     Vladimir Jones MD  Orthopedic Surgery

## 2023-07-04 NOTE — PLAN OF CARE
Goal Outcome Evaluation:       Pt. Pleasant and cooperative, c/o pain to L ankle, PRN tylenol given at 0753 and Indocin given at 1000, eating regular diet family provided food, started on IV fluids and IV abx, when down to IR for US joint injection aspiration of L thumb, standby assistance with walker to bathroom, understands some english but Hmong  is preferred, will cont to monitor.

## 2023-07-04 NOTE — PROGRESS NOTES
SW completed chart assessment due to low readmissions score. Pt comes from home with spouse and son, and appears to be independent at baseline. Follow for therapy recommendations. Anticipate no needs from care management at discharge. Pt to follow with PCP if needs arise post discharge.    CM to continue to follow through hospitalization.  12:17 PM    MILA Mena  7/4/2023

## 2023-07-04 NOTE — PROGRESS NOTES
"[facility]    Medicine Progress Note - Hospitalist Service    Date of Admission:  7/3/2023    Assessment & Plan   Tyler Miller is a 42 year old male with past medical history of severe tophaceous gout involving multiple joints on hands and feet who presented to ED for evaluation of hands and feet joint pain and swelling.  Patient admitted for acute gout flareup, septic arthritis.    Severe tophaceous gout involving multiple joints with acute flareup;  MRI left foot and ankle reported no evidence of drainable soft tissue abscess or definite evidence of osteomyelitis  -- Continue twice daily colchicine, still ongoing issues with pain, added as needed indomethacin, may need to schedule if continued to have ongoing pain  -- Also on as needed narcotic pain medications but instructed patient and nursing staffs to utilize indomethacin  -- On admission uric acid 8.0.  PTA Uloric  -- Appreciated orthopedic input    Septic arthritis;  Gram-positive bacteremia;  -- BC from 7/3 reported gram-positive cocci in pairs and chains and in process  -- Currently on IV Vanco and IV Rocephin.  Appreciated ID input.  -- Leukocytosis significantly better today    Hyponatremia; Fairly stable.  Trend    Chronic anemia; fairly stable despite IV fluid.  Work-up through PCP as an outpatient       Diet: Combination Diet Regular Diet Adult    DVT Prophylaxis: Enoxaparin (Lovenox) SQ  Senior Catheter: Not present  Lines: None     Cardiac Monitoring: None  Code Status: Full Code      Clinically Significant Risk Factors           # Hypercalcemia: corrected calcium is >10.1, will monitor as appropriate    # Hypoalbuminemia: Lowest albumin = 2.8 g/dL at 7/4/2023  6:23 AM, will monitor as appropriate     # Hypertension: Noted on problem list        # Obesity: Estimated body mass index is 30.9 kg/m  as calculated from the following:    Height as of this encounter: 1.626 m (5' 4\").    Weight as of this encounter: 81.6 kg (180 lb)., " PRESENT ON ADMISSION          Disposition Plan     Expected Discharge Date: 07/05/2023      Destination: home with family            Jaxon HERRING MD  Hospitalist Service  [facility]  Securely message with [employer] (more info)  Text page via [employer] Paging/Directory   ______________________________________________________________________    Interval History   Patient seen and examined.  Notes, labs, imaging report personally reviewed.  Patient denied feeling fever or chills.  However, complaining of ongoing severe pain mostly on left ankle/feet.  Denied short of breath or chest pain.  Denied abdomen pain, nausea, vomiting.  Discussed with nursing staffs.      Physical Exam   Vital Signs: Temp: 98.6  F (37  C) Temp src: Oral BP: 114/75 Pulse: 99   Resp: 18 SpO2: 98 % O2 Device: None (Room air)    Weight: 180 lbs 0 oz      General: Not in obvious distress.  HEENT: Normocephalic, supple neck  Chest: Clear to auscultation bilateral anteriorly, no wheezing  Heart: S1S2 normal, regular  Abdomen: Soft. NT, ND. Bowel sounds- active.  Extremities: No legs swelling  Neuro: alert and awake, grossly non-focal  Musculoskeletal; multiple hand and feet joints with severe tophaceous gout, swelling, tenderness on left ankle and feet, multiple open wounds.  Please refer to ID consult note from 7/3 for pictures        Medical Decision Making             Data     I have personally reviewed the following data over the past 24 hrs:    12.7 (H)  \   10.2 (L)   / 258     132 (L) 101 16.6 /  162 (H)   3.8 19 (L) 1.02 \       ALT: 52 AST: 27 AP: 234 (H) TBILI: 1.3 (H)   ALB: 2.8 (L) TOT PROTEIN: 6.9 LIPASE: N/A       Procal: N/A CRP: 271.60 (H) Lactic Acid: 3.8 (H)         Imaging results reviewed over the past 24 hrs:   Recent Results (from the past 24 hour(s))   XR Foot 3 Views Standing Bilateral    Narrative    EXAM: XR FOOT 3 VIEWS STANDING BILATERAL  LOCATION: [facility]  DATE:  7/3/2023    INDICATION: Chronic bilateral foot wounds. Evaluate osteomyelitis or occult pathology. History of gout.  COMPARISON: None.      Impression    IMPRESSION: Marked erosive/destructive changes involving the right second MTP joint with extensive destruction of the second metatarsal head and neck as well as the majority of the proximal middle phalanges most likely secondary to gout with marked   surrounding soft tissue swelling. Mild periarticular erosive changes involving the right third and fourth TMT and third MTP joint.    In the left foot, there are marked periarticular erosive changes involving the MTP joint of the great toe with marked surrounding soft tissue swelling most consistent with gout. Mild periarticular erosive changes involving the second MTP and third TMT   joints.       MR Foot Left w/o & w Contrast    Narrative    EXAM: MR FOOT LEFT W/O and W CONTRAST, MR ANKLE LEFT W/O and W CONTRAST  LOCATION: [facility]  DATE: 7/4/2023    INDICATION: left ankle pain, erosion of 2nd MT and phalanx, RO septic arthritis, gout, vs osteo  COMPARISON: None.  TECHNIQUE: Routine. Additional postgadolinium T1 sequences were obtained.  IV CONTRAST: 8ml Gadavist    FINDINGS:     JOINTS AND BONES:   -Extensive erosive osseous changes are seen in the left foot and ankle most pronounced in the first MTP joint as well as the second metatarsal head with additional erosive foci are seen in the midfoot, second mid and distal phalanx, with large   proliferative erosive changes seen in the posterior subtalar joints and posterior aspect of the talar. These proliferative erosive soft tissue masses are relatively hypointense on T1 and isointense on T2-weighted heterogeneous enhancement on postcontrast   imaging. The bone marrow adjacent to the erosive masses is not significantly edematous or enhancing.    TENDONS:   -There is mild thickening and increased T2 signal of the distal aspect of the Achilles  tendon (image 12, series 12).     LIGAMENTS:   -Lisfranc ligament: Intact. No subluxation.    MUSCLES AND SOFT TISSUES:   -Extensive large soft tissue masses is are seen in throughout the forefoot and hindfoot, which are low on T1 signal and intermediate T2 signal with variable postcontrast enhancement.      Impression    IMPRESSION:  1.  Multifocal extensive large soft tissue masses with underlying osseous erosion seen in the forefoot and hindfoot, most pronounced at the first MTP joint and second metatarsal head as well as in the posterior aspect of the ankle joint, favored reflect   large gouty tophi given the patient's history and enhancement characteristics.  2.  No evidence of drainable soft tissue abscess or definitive evidence of osteomyelitis is seen.  3.  Mild Achilles tendinosis   MR Ankle Left w/o & w Contrast    Narrative    EXAM: MR FOOT LEFT W/O and W CONTRAST, MR ANKLE LEFT W/O and W CONTRAST  LOCATION: [facility]  DATE: 7/4/2023    INDICATION: left ankle pain, erosion of 2nd MT and phalanx, RO septic arthritis, gout, vs osteo  COMPARISON: None.  TECHNIQUE: Routine. Additional postgadolinium T1 sequences were obtained.  IV CONTRAST: 8ml Gadavist    FINDINGS:     JOINTS AND BONES:   -Extensive erosive osseous changes are seen in the left foot and ankle most pronounced in the first MTP joint as well as the second metatarsal head with additional erosive foci are seen in the midfoot, second mid and distal phalanx, with large   proliferative erosive changes seen in the posterior subtalar joints and posterior aspect of the talar. These proliferative erosive soft tissue masses are relatively hypointense on T1 and isointense on T2-weighted heterogeneous enhancement on postcontrast   imaging. The bone marrow adjacent to the erosive masses is not significantly edematous or enhancing.    TENDONS:   -There is mild thickening and increased T2 signal of the distal aspect of the Achilles  tendon (image 12, series 12).     LIGAMENTS:   -Lisfranc ligament: Intact. No subluxation.    MUSCLES AND SOFT TISSUES:   -Extensive large soft tissue masses is are seen in throughout the forefoot and hindfoot, which are low on T1 signal and intermediate T2 signal with variable postcontrast enhancement.      Impression    IMPRESSION:  1.  Multifocal extensive large soft tissue masses with underlying osseous erosion seen in the forefoot and hindfoot, most pronounced at the first MTP joint and second metatarsal head as well as in the posterior aspect of the ankle joint, favored reflect   large gouty tophi given the patient's history and enhancement characteristics.  2.  No evidence of drainable soft tissue abscess or definitive evidence of osteomyelitis is seen.  3.  Mild Achilles tendinosis

## 2023-07-04 NOTE — PROGRESS NOTES
Infectious Disease Progress Note    Assessment/Plan  Impression:  1. Severe tophaceous gout involving multiple joints: Hand, foot, see photos below  2. Septic arthritis- Secondary bacterial infection with gram-positive cocci--Blood and wound culture are growing Group A strep.   3. Severe leukocytosis, white count 33 K  4. Worsening pain, drainage over the last 3 days  5. Also having a lot of L ankle pain.   6. History of hypertension and anemia.  Patient is on colchicine and Uloric prior to admission for gout                         Recommendations     1. Ceftriaxone continues.  Discontinue vancomycin.  Add clindamycin.   2. De-escalate antibiotics based on final culture results  3. Follow blood cultures, CBC, CMP  4. Orthopedic consulted for I&D--no plans yet.   5. Patient remain hospitalized at least for 3 to 4 days depending on blood culture results, clinical response  6. May need IV antibiotics on discharge  7. Multiple skin lesions, would need dermatology as an outpatient  8. Thank you for consulting infectious disease.  Patient seen in room with extensive family that requested they interpret.     Discussed with Dr Jones from [facility].      Principal Problem:    Acute idiopathic gout of right hand  Active Problems:    Chronic tophaceous gout    Cellulitis of finger of right hand    Acute idiopathic gout of left ankle      NEIDA GIORDANO MD  [phone]      Subjective  Still with pain in L ankle.  Otherwise improving.     Objective    Vital signs in last 24 hours  Temp:  [97.4  F (36.3  C)-98.6  F (37  C)] 98.6  F (37  C)  Pulse:  [] 99  Resp:  [16-18] 18  BP: (114-141)/(75-98) 114/75  SpO2:  [79 %-100 %] 98 %  Wt Readings from Last 3 Encounters:   07/03/23 81.6 kg (180 lb)   03/29/21 91.2 kg (201 lb 1.3 oz)           Intake/Output last 3 shifts  I/O last 3 completed shifts:  In: 2590 [P.O.:1440; I.V.:900; IV Piggyback:250]  Out: 1625 [Urine:1625]  Intake/Output this shift:  No intake/output  data recorded.    Review of Systems   Pertinent items are noted in HPI., otherwise negative.    Physical Exam  GENERAL APPEARANCE:  awake, mild distress at time of movement  EYES: Eyes grossly normal to inspection, conjunctivae and sclerae normal  HENT: mouth without ulcers or lesions  NECK: supple  RESP: normal breathing pattern, clear to auscultation  CV: regular rates and rhythm, S1 S2  LYMPHATICS: Notable toes finger swelling  ABDOMEN: soft, nontender, nondistended  MS: extremities normal-patient is gout gross deformities noted-see above--still significant pain and swelling in Left ankle.   SKIN: Lesions, open wounds on right thumb, fingers, left middle finger edematous, multiple toes with gout and ulceration, tophaceous gout of multiple extremities  Papular brown discolored lesions on thighs, per patient has been there for several months    Pertinent Labs   Lab Results: personally reviewed.     Recent Labs   Lab 07/04/23  0623 07/03/23  0736 07/03/23  0142   WBC 12.7* 33.1* 20.3*   HGB 10.2* 11.0* 11.3*   HCT 33.4* 37.4* 37.1*    386 439        Recent Labs   Lab 07/04/23  0623 07/03/23  0736 07/03/23  0142   * 133* 133*   CO2 19* 19* 20*   BUN 16.6 17.4 13.9     No results found for: CULT  7-Day Micro Results     Collected Updated Procedure Result Status      07/03/2023 0318 07/03/2023 1032 MRSA MSSA PCR, Nasal Swab [[id number]]    Swab from Nose    Final result Component Value   MRSA Target DNA Negative   SA Target DNA Positive            07/03/2023 0302 07/03/2023 1906 Blood Culture Peripheral Blood [[id number]]   (Abnormal)   Peripheral Blood    Preliminary result Component Value   Culture Positive on the 1st day of incubation  [P]     Gram positive cocci in pairs and chains  [P]     2 of 2 bottles               07/03/2023 0242 07/03/2023 1658 Blood Culture Peripheral Blood [[id number]]   (Abnormal)   Peripheral Blood    Preliminary result Component Value   Culture Positive on the 1st day  of incubation  [P]     Gram positive cocci in pairs and chains  [P]     2 of 2 bottles               07/03/2023 0242 07/03/2023 1857 Verigene GP Panel [[id number]]    (Abnormal)   Peripheral Blood    Final result Component Value   Staphylococcus species Not Detected   Staphylococcus aureus Not Detected   Staphylococcus epidermidis Not Detected   Staphylococcus lugdunensis Not Detected   Enterococcus faecalis Not Detected   Enterococcus faecium Not Detected   Streptococcus agalactiae Not Detected   Streptococcus anginosus group Not Detected   Streptococcus pneumoniae Not Detected   Streptococcus pyogenes Detected   Positive for Streptococcus pyogenes (Group A Streptococcus) by [employer] multiplex nucleic acid test. Penicillin and ampicillin are drugs of choice; non-susceptible isolates have not been reported. Final identification and antimicrobial susceptibility testing will be verified by standard methods.   Listeria species Not Detected            07/03/2023 0142 07/04/2023 1035 Abscess Aerobic Bacterial Culture Routine with Gram Stain [[id number]]   (Abnormal)   Abscess from Thumb, Right    Preliminary result Component Value   Culture Culture in progress  [P]     2+ Streptococcus pyogenes (Group A Streptococcus)  [P]     This organism is susceptible to ampicillin, penicillin, vancomycin and the cephalosporins. If treatment is required and your patient is allergic to penicillin, contact the microbiology lab within 5 days to request susceptibility testing.   Gram Stain Result 1+ Gram positive cocci  [P]     2+ WBC seen  [P]                      Pertinent Radiology   Radiology Results:     MR Foot Left w/o & w Contrast    Result Date: 7/4/2023  EXAM: MR FOOT LEFT W/O and W CONTRAST, MR ANKLE LEFT W/O and W CONTRAST LOCATION: [facility] DATE: 7/4/2023 INDICATION: left ankle pain, erosion of 2nd MT and phalanx, RO septic arthritis, gout, vs osteo COMPARISON: None. TECHNIQUE: Routine.  Additional postgadolinium T1 sequences were obtained. IV CONTRAST: 8ml Gadavist FINDINGS: JOINTS AND BONES: -Extensive erosive osseous changes are seen in the left foot and ankle most pronounced in the first MTP joint as well as the second metatarsal head with additional erosive foci are seen in the midfoot, second mid and distal phalanx, with large proliferative erosive changes seen in the posterior subtalar joints and posterior aspect of the talar. These proliferative erosive soft tissue masses are relatively hypointense on T1 and isointense on T2-weighted heterogeneous enhancement on postcontrast  imaging. The bone marrow adjacent to the erosive masses is not significantly edematous or enhancing. TENDONS: -There is mild thickening and increased T2 signal of the distal aspect of the Achilles tendon (image 12, series 12). LIGAMENTS: -Lisfranc ligament: Intact. No subluxation. MUSCLES AND SOFT TISSUES: -Extensive large soft tissue masses is are seen in throughout the forefoot and hindfoot, which are low on T1 signal and intermediate T2 signal with variable postcontrast enhancement.     IMPRESSION: 1.  Multifocal extensive large soft tissue masses with underlying osseous erosion seen in the forefoot and hindfoot, most pronounced at the first MTP joint and second metatarsal head as well as in the posterior aspect of the ankle joint, favored reflect large gouty tophi given the patient's history and enhancement characteristics. 2.  No evidence of drainable soft tissue abscess or definitive evidence of osteomyelitis is seen. 3.  Mild Achilles tendinosis    MR Ankle Left w/o & w Contrast    Result Date: 7/4/2023  EXAM: MR FOOT LEFT W/O and W CONTRAST, MR ANKLE LEFT W/O and W CONTRAST LOCATION: [facility] DATE: 7/4/2023 INDICATION: left ankle pain, erosion of 2nd MT and phalanx, RO septic arthritis, gout, vs osteo COMPARISON: None. TECHNIQUE: Routine. Additional postgadolinium T1 sequences were  obtained. IV CONTRAST: 8ml Gadavist FINDINGS: JOINTS AND BONES: -Extensive erosive osseous changes are seen in the left foot and ankle most pronounced in the first MTP joint as well as the second metatarsal head with additional erosive foci are seen in the midfoot, second mid and distal phalanx, with large proliferative erosive changes seen in the posterior subtalar joints and posterior aspect of the talar. These proliferative erosive soft tissue masses are relatively hypointense on T1 and isointense on T2-weighted heterogeneous enhancement on postcontrast  imaging. The bone marrow adjacent to the erosive masses is not significantly edematous or enhancing. TENDONS: -There is mild thickening and increased T2 signal of the distal aspect of the Achilles tendon (image 12, series 12). LIGAMENTS: -Lisfranc ligament: Intact. No subluxation. MUSCLES AND SOFT TISSUES: -Extensive large soft tissue masses is are seen in throughout the forefoot and hindfoot, which are low on T1 signal and intermediate T2 signal with variable postcontrast enhancement.     IMPRESSION: 1.  Multifocal extensive large soft tissue masses with underlying osseous erosion seen in the forefoot and hindfoot, most pronounced at the first MTP joint and second metatarsal head as well as in the posterior aspect of the ankle joint, favored reflect large gouty tophi given the patient's history and enhancement characteristics. 2.  No evidence of drainable soft tissue abscess or definitive evidence of osteomyelitis is seen. 3.  Mild Achilles tendinosis    XR Foot 3 Views Standing Bilateral    Result Date: 7/3/2023  EXAM: XR FOOT 3 VIEWS STANDING BILATERAL LOCATION: [facility] DATE: 7/3/2023 INDICATION: Chronic bilateral foot wounds. Evaluate osteomyelitis or occult pathology. History of gout. COMPARISON: None.     IMPRESSION: Marked erosive/destructive changes involving the right second MTP joint with extensive destruction of the second  metatarsal head and neck as well as the majority of the proximal middle phalanges most likely secondary to gout with marked surrounding soft tissue swelling. Mild periarticular erosive changes involving the right third and fourth TMT and third MTP joint. In the left foot, there are marked periarticular erosive changes involving the MTP joint of the great toe with marked surrounding soft tissue swelling most consistent with gout. Mild periarticular erosive changes involving the second MTP and third TMT joints.     XR Finger Right G/E 2 Views    Result Date: 7/3/2023  EXAM: XR FINGER RIGHT G/E 2 VIEWS LOCATION: [facility] DATE: 7/3/2023 INDICATION: right thumb pain, h o gout COMPARISON: 03/29/2021     IMPRESSION: Marked soft tissue swelling of the proximal soft tissues of the thumb with periarticular osteopenia which likely reflect the sequela of patient's known gout. There is slight subluxation of the distal phalanx of the thumb relative to the proximal phalanx, though no evidence of a displaced fracture or dislocation.     XR Ankle Left G/E 3 Views    Result Date: 7/3/2023  EXAM: XR ANKLE LEFT G/E 3 VIEWS LOCATION: [facility] DATE: 7/3/2023 INDICATION: left ankle pain, h o gout COMPARISON: None.     IMPRESSION: No acute fracture or erosion. No foreign body.

## 2023-07-04 NOTE — PLAN OF CARE
"  Problem: Plan of Care - These are the overarching goals to be used throughout the patient stay.    Goal: Plan of Care Review  Description: The Plan of Care Review/Shift note should be completed every shift.  The Outcome Evaluation is a brief statement about your assessment that the patient is improving, declining, or no change.  This information will be displayed automatically on your shift note.  Outcome: Progressing  Goal: Patient-Specific Goal (Individualized)  Description: You can add care plan individualizations to a care plan. Examples of Individualization might be:  \"Parent requests to be called daily at 9am for status\", \"I have a hard time hearing out of my right ear\", or \"Do not touch me to wake me up as it startles me\".  Outcome: Progressing  Goal: Absence of Hospital-Acquired Illness or Injury  Outcome: Progressing  Intervention: Identify and Manage Fall Risk  Recent Flowsheet Documentation  Taken 7/3/2023 1910 by Natacha Michael RN  Safety Promotion/Fall Prevention:   clutter free environment maintained   activity supervised  Intervention: Prevent Skin Injury  Recent Flowsheet Documentation  Taken 7/3/2023 1910 by Natacha Michael RN  Body Position: position changed independently  Goal: Optimal Comfort and Wellbeing  Outcome: Progressing  Goal: Readiness for Transition of Care  Outcome: Progressing  Intervention: Mutually Develop Transition Plan  Recent Flowsheet Documentation  Taken 7/3/2023 1900 by Natacha Michael, RN  Patient/Family Anticipates Transition to: home with family     Problem: Sepsis/Septic Shock  Goal: Optimal Coping  Outcome: Progressing  Goal: Absence of Bleeding  Outcome: Progressing  Goal: Blood Glucose Level Within Targeted Range  Outcome: Progressing  Goal: Absence of Infection Signs and Symptoms  Outcome: Progressing  Intervention: Promote Recovery  Recent Flowsheet Documentation  Taken 7/3/2023 1910 by Natacha Michael, RN  Activity Management: activity adjusted per " tolerance  Goal: Optimal Nutrition Intake  Outcome: Progressing     Problem: Infection  Goal: Absence of Infection Signs and Symptoms  Outcome: Progressing     Problem: Wound Healing Progression  Goal: Optimal Wound Healing  Outcome: Progressing  Intervention: Promote Wound Healing  Recent Flowsheet Documentation  Taken 7/3/2023 1910 by Natacha Michael RN  Activity Management: activity adjusted per tolerance   Goal Outcome Evaluation:  Pt transferred from ED to unit around 1910 this evening.  Pt is alert and oriented, uses call light appropriately.  Pt is up with walker, gait belt, and SBA.  Pt does not like to bear weight on left foot due to pain.  Pt speaks some English, but needs an  for complicated explanations.  Used  services to complete MRI checklist.  Pt eats, drinks, and takes pills without incident.  Pt will continue on IV antibiotics and pain medications.  Pt is currently down at MRI.  Pt was given IVP dilaudid 20 mins prior to going down to MRI.  Pt will discharge home with family when medically stable.

## 2023-07-04 NOTE — PLAN OF CARE
Problem: Plan of Care - These are the overarching goals to be used throughout the patient stay.    Goal: Plan of Care Review  Description: The Plan of Care Review/Shift note should be completed every shift.  The Outcome Evaluation is a brief statement about your assessment that the patient is improving, declining, or no change.  This information will be displayed automatically on your shift note.  Outcome: Progressing     Problem: Sepsis/Septic Shock  Goal: Optimal Coping  Outcome: Progressing     Problem: Infection  Goal: Absence of Infection Signs and Symptoms  Outcome: Progressing   Goal Outcome Evaluation:       pt alert and oriented, VSS, Hmong speaking, SBA with walker, Oxy given for pain, IV antibiotic given, NS d/c, slept between cares

## 2023-07-05 ENCOUNTER — ANCILLARY PROCEDURE (OUTPATIENT)
Dept: ULTRASOUND IMAGING | Facility: HOSPITAL | Age: 43
End: 2023-07-05
Attending: ANESTHESIOLOGY
Payer: COMMERCIAL

## 2023-07-05 ENCOUNTER — ANESTHESIA (OUTPATIENT)
Dept: SURGERY | Facility: HOSPITAL | Age: 43
End: 2023-07-05
Payer: COMMERCIAL

## 2023-07-05 LAB
ALBUMIN SERPL BCG-MCNC: 2.6 G/DL (ref 3.5–5.2)
ALP SERPL-CCNC: 271 U/L (ref 40–129)
ALT SERPL W P-5'-P-CCNC: 35 U/L (ref 0–70)
ANION GAP SERPL CALCULATED.3IONS-SCNC: 15 MMOL/L (ref 7–15)
AST SERPL W P-5'-P-CCNC: 29 U/L (ref 0–45)
BACTERIA ABSC ANAEROBE+AEROBE CULT: ABNORMAL
BACTERIA BLD CULT: ABNORMAL
BASOPHILS # BLD AUTO: 0 10E3/UL (ref 0–0.2)
BASOPHILS NFR BLD AUTO: 0 %
BILIRUB SERPL-MCNC: 1.1 MG/DL
BUN SERPL-MCNC: 15 MG/DL (ref 6–20)
CALCIUM SERPL-MCNC: 9.4 MG/DL (ref 8.6–10)
CHLORIDE SERPL-SCNC: 100 MMOL/L (ref 98–107)
CREAT SERPL-MCNC: 1.11 MG/DL (ref 0.67–1.17)
CREAT SERPL-MCNC: 1.13 MG/DL (ref 0.67–1.17)
DEPRECATED HCO3 PLAS-SCNC: 17 MMOL/L (ref 22–29)
EOSINOPHIL # BLD AUTO: 0.1 10E3/UL (ref 0–0.7)
EOSINOPHIL NFR BLD AUTO: 1 %
ERYTHROCYTE [DISTWIDTH] IN BLOOD BY AUTOMATED COUNT: 17.8 % (ref 10–15)
GFR SERPL CREATININE-BSD FRML MDRD: 84 ML/MIN/1.73M2
GFR SERPL CREATININE-BSD FRML MDRD: 86 ML/MIN/1.73M2
GLUCOSE BLDC GLUCOMTR-MCNC: 105 MG/DL (ref 70–99)
GLUCOSE BLDC GLUCOMTR-MCNC: 67 MG/DL (ref 70–99)
GLUCOSE SERPL-MCNC: 119 MG/DL (ref 70–99)
GRAM STAIN RESULT: ABNORMAL
GRAM STAIN RESULT: NORMAL
GRAM STAIN RESULT: NORMAL
HCT VFR BLD AUTO: 31.8 % (ref 40–53)
HGB BLD-MCNC: 9.8 G/DL (ref 13.3–17.7)
IMM GRANULOCYTES # BLD: 0.1 10E3/UL
IMM GRANULOCYTES NFR BLD: 1 %
LYMPHOCYTES # BLD AUTO: 2.2 10E3/UL (ref 0.8–5.3)
LYMPHOCYTES NFR BLD AUTO: 19 %
MCH RBC QN AUTO: 20.9 PG (ref 26.5–33)
MCHC RBC AUTO-ENTMCNC: 30.8 G/DL (ref 31.5–36.5)
MCV RBC AUTO: 68 FL (ref 78–100)
MONOCYTES # BLD AUTO: 0.8 10E3/UL (ref 0–1.3)
MONOCYTES NFR BLD AUTO: 7 %
NEUTROPHILS # BLD AUTO: 8.4 10E3/UL (ref 1.6–8.3)
NEUTROPHILS NFR BLD AUTO: 72 %
NRBC # BLD AUTO: 0 10E3/UL
NRBC BLD AUTO-RTO: 0 /100
PLATELET # BLD AUTO: 255 10E3/UL (ref 150–450)
POTASSIUM SERPL-SCNC: 4.1 MMOL/L (ref 3.4–5.3)
POTASSIUM SERPL-SCNC: 4.6 MMOL/L (ref 3.4–5.3)
PROT SERPL-MCNC: 7.5 G/DL (ref 6.4–8.3)
RBC # BLD AUTO: 4.69 10E6/UL (ref 4.4–5.9)
SODIUM SERPL-SCNC: 132 MMOL/L (ref 136–145)
WBC # BLD AUTO: 11.6 10E3/UL (ref 4–11)

## 2023-07-05 PROCEDURE — 87070 CULTURE OTHR SPECIMN AEROBIC: CPT | Performed by: ORTHOPAEDIC SURGERY

## 2023-07-05 PROCEDURE — 0Y9M0ZZ DRAINAGE OF RIGHT FOOT, OPEN APPROACH: ICD-10-PCS | Performed by: ORTHOPAEDIC SURGERY

## 2023-07-05 PROCEDURE — 370N000017 HC ANESTHESIA TECHNICAL FEE, PER MIN: Performed by: ORTHOPAEDIC SURGERY

## 2023-07-05 PROCEDURE — 250N000013 HC RX MED GY IP 250 OP 250 PS 637: Performed by: PHYSICIAN ASSISTANT

## 2023-07-05 PROCEDURE — 0HDFXZZ EXTRACTION OF RIGHT HAND SKIN, EXTERNAL APPROACH: ICD-10-PCS | Performed by: ORTHOPAEDIC SURGERY

## 2023-07-05 PROCEDURE — 84132 ASSAY OF SERUM POTASSIUM: CPT | Performed by: INTERNAL MEDICINE

## 2023-07-05 PROCEDURE — 250N000013 HC RX MED GY IP 250 OP 250 PS 637: Performed by: HOSPITALIST

## 2023-07-05 PROCEDURE — 250N000009 HC RX 250: Performed by: ANESTHESIOLOGY

## 2023-07-05 PROCEDURE — 80053 COMPREHEN METABOLIC PANEL: CPT | Performed by: HOSPITALIST

## 2023-07-05 PROCEDURE — 250N000011 HC RX IP 250 OP 636: Mod: JZ | Performed by: INTERNAL MEDICINE

## 2023-07-05 PROCEDURE — 87040 BLOOD CULTURE FOR BACTERIA: CPT | Performed by: INTERNAL MEDICINE

## 2023-07-05 PROCEDURE — 250N000009 HC RX 250

## 2023-07-05 PROCEDURE — 250N000011 HC RX IP 250 OP 636: Mod: JZ

## 2023-07-05 PROCEDURE — 360N000075 HC SURGERY LEVEL 2, PER MIN: Performed by: ORTHOPAEDIC SURGERY

## 2023-07-05 PROCEDURE — 258N000003 HC RX IP 258 OP 636

## 2023-07-05 PROCEDURE — 258N000003 HC RX IP 258 OP 636: Performed by: PHYSICIAN ASSISTANT

## 2023-07-05 PROCEDURE — 120N000001 HC R&B MED SURG/OB

## 2023-07-05 PROCEDURE — 0HDNXZZ EXTRACTION OF LEFT FOOT SKIN, EXTERNAL APPROACH: ICD-10-PCS | Performed by: ORTHOPAEDIC SURGERY

## 2023-07-05 PROCEDURE — 250N000011 HC RX IP 250 OP 636: Performed by: ANESTHESIOLOGY

## 2023-07-05 PROCEDURE — 250N000011 HC RX IP 250 OP 636

## 2023-07-05 PROCEDURE — 87075 CULTR BACTERIA EXCEPT BLOOD: CPT | Performed by: ORTHOPAEDIC SURGERY

## 2023-07-05 PROCEDURE — 250N000011 HC RX IP 250 OP 636: Mod: JZ | Performed by: PHYSICIAN ASSISTANT

## 2023-07-05 PROCEDURE — 250N000013 HC RX MED GY IP 250 OP 250 PS 637: Performed by: INTERNAL MEDICINE

## 2023-07-05 PROCEDURE — 0S9F0ZZ DRAINAGE OF RIGHT ANKLE JOINT, OPEN APPROACH: ICD-10-PCS | Performed by: ORTHOPAEDIC SURGERY

## 2023-07-05 PROCEDURE — 36415 COLL VENOUS BLD VENIPUNCTURE: CPT | Performed by: HOSPITALIST

## 2023-07-05 PROCEDURE — 99232 SBSQ HOSP IP/OBS MODERATE 35: CPT | Performed by: INTERNAL MEDICINE

## 2023-07-05 PROCEDURE — 36415 COLL VENOUS BLD VENIPUNCTURE: CPT | Performed by: INTERNAL MEDICINE

## 2023-07-05 PROCEDURE — 250N000009 HC RX 250: Performed by: ORTHOPAEDIC SURGERY

## 2023-07-05 PROCEDURE — 87205 SMEAR GRAM STAIN: CPT | Performed by: ORTHOPAEDIC SURGERY

## 2023-07-05 PROCEDURE — 272N000001 HC OR GENERAL SUPPLY STERILE: Performed by: ORTHOPAEDIC SURGERY

## 2023-07-05 PROCEDURE — 250N000011 HC RX IP 250 OP 636: Performed by: INTERNAL MEDICINE

## 2023-07-05 PROCEDURE — 85025 COMPLETE CBC W/AUTO DIFF WBC: CPT | Performed by: INTERNAL MEDICINE

## 2023-07-05 PROCEDURE — 87102 FUNGUS ISOLATION CULTURE: CPT | Performed by: ORTHOPAEDIC SURGERY

## 2023-07-05 PROCEDURE — 999N000141 HC STATISTIC PRE-PROCEDURE NURSING ASSESSMENT: Performed by: ORTHOPAEDIC SURGERY

## 2023-07-05 RX ORDER — LIDOCAINE 40 MG/G
CREAM TOPICAL
Status: DISCONTINUED | OUTPATIENT
Start: 2023-07-05 | End: 2023-07-05

## 2023-07-05 RX ORDER — ONDANSETRON 2 MG/ML
4 INJECTION INTRAMUSCULAR; INTRAVENOUS EVERY 30 MIN PRN
Status: DISCONTINUED | OUTPATIENT
Start: 2023-07-05 | End: 2023-07-05 | Stop reason: HOSPADM

## 2023-07-05 RX ORDER — SODIUM CHLORIDE, SODIUM LACTATE, POTASSIUM CHLORIDE, CALCIUM CHLORIDE 600; 310; 30; 20 MG/100ML; MG/100ML; MG/100ML; MG/100ML
INJECTION, SOLUTION INTRAVENOUS CONTINUOUS
Status: DISCONTINUED | OUTPATIENT
Start: 2023-07-05 | End: 2023-07-05 | Stop reason: HOSPADM

## 2023-07-05 RX ORDER — IBUPROFEN 600 MG/1
600 TABLET, FILM COATED ORAL EVERY 6 HOURS PRN
Status: DISCONTINUED | OUTPATIENT
Start: 2023-07-05 | End: 2023-07-05

## 2023-07-05 RX ORDER — FENTANYL CITRATE 50 UG/ML
INJECTION, SOLUTION INTRAMUSCULAR; INTRAVENOUS PRN
Status: DISCONTINUED | OUTPATIENT
Start: 2023-07-05 | End: 2023-07-05

## 2023-07-05 RX ORDER — ONDANSETRON 4 MG/1
4 TABLET, ORALLY DISINTEGRATING ORAL EVERY 30 MIN PRN
Status: DISCONTINUED | OUTPATIENT
Start: 2023-07-05 | End: 2023-07-05 | Stop reason: HOSPADM

## 2023-07-05 RX ORDER — CEFAZOLIN SODIUM 2 G/100ML
2 INJECTION, SOLUTION INTRAVENOUS EVERY 8 HOURS
Status: DISCONTINUED | OUTPATIENT
Start: 2023-07-05 | End: 2023-07-05

## 2023-07-05 RX ORDER — FENTANYL CITRATE 50 UG/ML
25 INJECTION, SOLUTION INTRAMUSCULAR; INTRAVENOUS EVERY 5 MIN PRN
Status: DISCONTINUED | OUTPATIENT
Start: 2023-07-05 | End: 2023-07-05 | Stop reason: HOSPADM

## 2023-07-05 RX ORDER — FENTANYL CITRATE 50 UG/ML
INJECTION, SOLUTION INTRAMUSCULAR; INTRAVENOUS
Status: COMPLETED
Start: 2023-07-05 | End: 2023-07-05

## 2023-07-05 RX ORDER — ONDANSETRON 2 MG/ML
4 INJECTION INTRAMUSCULAR; INTRAVENOUS EVERY 6 HOURS PRN
Status: DISCONTINUED | OUTPATIENT
Start: 2023-07-05 | End: 2023-07-10 | Stop reason: HOSPADM

## 2023-07-05 RX ORDER — ASPIRIN 81 MG/1
81 TABLET ORAL 2 TIMES DAILY
Qty: 56 TABLET | Refills: 0 | Status: SHIPPED | OUTPATIENT
Start: 2023-07-05

## 2023-07-05 RX ORDER — FENTANYL CITRATE 50 UG/ML
50 INJECTION, SOLUTION INTRAMUSCULAR; INTRAVENOUS EVERY 5 MIN PRN
Status: DISCONTINUED | OUTPATIENT
Start: 2023-07-05 | End: 2023-07-05 | Stop reason: HOSPADM

## 2023-07-05 RX ORDER — BISACODYL 10 MG
10 SUPPOSITORY, RECTAL RECTAL DAILY PRN
Status: DISCONTINUED | OUTPATIENT
Start: 2023-07-05 | End: 2023-07-10 | Stop reason: HOSPADM

## 2023-07-05 RX ORDER — SODIUM CHLORIDE, SODIUM LACTATE, POTASSIUM CHLORIDE, CALCIUM CHLORIDE 600; 310; 30; 20 MG/100ML; MG/100ML; MG/100ML; MG/100ML
INJECTION, SOLUTION INTRAVENOUS CONTINUOUS
Status: DISCONTINUED | OUTPATIENT
Start: 2023-07-05 | End: 2023-07-05

## 2023-07-05 RX ORDER — PROPOFOL 10 MG/ML
INJECTION, EMULSION INTRAVENOUS CONTINUOUS PRN
Status: DISCONTINUED | OUTPATIENT
Start: 2023-07-05 | End: 2023-07-05

## 2023-07-05 RX ORDER — MAGNESIUM HYDROXIDE 1200 MG/15ML
LIQUID ORAL PRN
Status: DISCONTINUED | OUTPATIENT
Start: 2023-07-05 | End: 2023-07-05 | Stop reason: HOSPADM

## 2023-07-05 RX ORDER — OXYCODONE HYDROCHLORIDE 5 MG/1
10 TABLET ORAL EVERY 4 HOURS PRN
Status: DISCONTINUED | OUTPATIENT
Start: 2023-07-05 | End: 2023-07-10 | Stop reason: HOSPADM

## 2023-07-05 RX ORDER — SODIUM CHLORIDE, SODIUM LACTATE, POTASSIUM CHLORIDE, CALCIUM CHLORIDE 600; 310; 30; 20 MG/100ML; MG/100ML; MG/100ML; MG/100ML
INJECTION, SOLUTION INTRAVENOUS CONTINUOUS PRN
Status: DISCONTINUED | OUTPATIENT
Start: 2023-07-05 | End: 2023-07-05

## 2023-07-05 RX ORDER — AMOXICILLIN 250 MG
2 CAPSULE ORAL 2 TIMES DAILY
Qty: 28 TABLET | Refills: 0 | Status: ON HOLD | OUTPATIENT
Start: 2023-07-05 | End: 2023-07-17

## 2023-07-05 RX ORDER — HYDROXYZINE HYDROCHLORIDE 25 MG/1
25 TABLET, FILM COATED ORAL EVERY 6 HOURS PRN
Status: DISCONTINUED | OUTPATIENT
Start: 2023-07-05 | End: 2023-07-10 | Stop reason: HOSPADM

## 2023-07-05 RX ORDER — AMOXICILLIN 250 MG
1 CAPSULE ORAL 2 TIMES DAILY
Status: DISCONTINUED | OUTPATIENT
Start: 2023-07-05 | End: 2023-07-10 | Stop reason: HOSPADM

## 2023-07-05 RX ORDER — SODIUM CHLORIDE, SODIUM LACTATE, POTASSIUM CHLORIDE, CALCIUM CHLORIDE 600; 310; 30; 20 MG/100ML; MG/100ML; MG/100ML; MG/100ML
INJECTION, SOLUTION INTRAVENOUS CONTINUOUS
Status: DISCONTINUED | OUTPATIENT
Start: 2023-07-05 | End: 2023-07-06

## 2023-07-05 RX ORDER — HYDROMORPHONE HCL IN WATER/PF 6 MG/30 ML
0.4 PATIENT CONTROLLED ANALGESIA SYRINGE INTRAVENOUS
Status: DISCONTINUED | OUTPATIENT
Start: 2023-07-05 | End: 2023-07-10 | Stop reason: HOSPADM

## 2023-07-05 RX ORDER — POLYETHYLENE GLYCOL 3350 17 G/17G
17 POWDER, FOR SOLUTION ORAL DAILY
Status: DISCONTINUED | OUTPATIENT
Start: 2023-07-06 | End: 2023-07-10 | Stop reason: HOSPADM

## 2023-07-05 RX ORDER — HYDROXYZINE HYDROCHLORIDE 25 MG/1
25 TABLET, FILM COATED ORAL EVERY 6 HOURS PRN
Qty: 30 TABLET | Refills: 0 | Status: SHIPPED | OUTPATIENT
Start: 2023-07-05

## 2023-07-05 RX ORDER — ACETAMINOPHEN 325 MG/1
650 TABLET ORAL EVERY 4 HOURS PRN
Status: DISCONTINUED | OUTPATIENT
Start: 2023-07-08 | End: 2023-07-10 | Stop reason: HOSPADM

## 2023-07-05 RX ORDER — LIDOCAINE HYDROCHLORIDE 10 MG/ML
INJECTION, SOLUTION INFILTRATION; PERINEURAL PRN
Status: DISCONTINUED | OUTPATIENT
Start: 2023-07-05 | End: 2023-07-05

## 2023-07-05 RX ORDER — HYDROMORPHONE HCL IN WATER/PF 6 MG/30 ML
0.2 PATIENT CONTROLLED ANALGESIA SYRINGE INTRAVENOUS
Status: DISCONTINUED | OUTPATIENT
Start: 2023-07-05 | End: 2023-07-10 | Stop reason: HOSPADM

## 2023-07-05 RX ORDER — ONDANSETRON 2 MG/ML
INJECTION INTRAMUSCULAR; INTRAVENOUS PRN
Status: DISCONTINUED | OUTPATIENT
Start: 2023-07-05 | End: 2023-07-05

## 2023-07-05 RX ORDER — ACETAMINOPHEN 325 MG/1
975 TABLET ORAL EVERY 8 HOURS
Status: DISPENSED | OUTPATIENT
Start: 2023-07-05 | End: 2023-07-08

## 2023-07-05 RX ORDER — ONDANSETRON 4 MG/1
4 TABLET, ORALLY DISINTEGRATING ORAL EVERY 6 HOURS PRN
Status: DISCONTINUED | OUTPATIENT
Start: 2023-07-05 | End: 2023-07-10 | Stop reason: HOSPADM

## 2023-07-05 RX ORDER — ASPIRIN 81 MG/1
81 TABLET ORAL 2 TIMES DAILY
Status: DISCONTINUED | OUTPATIENT
Start: 2023-07-05 | End: 2023-07-10 | Stop reason: HOSPADM

## 2023-07-05 RX ORDER — BUPIVACAINE HYDROCHLORIDE AND EPINEPHRINE 5; 5 MG/ML; UG/ML
INJECTION, SOLUTION PERINEURAL
Status: COMPLETED | OUTPATIENT
Start: 2023-07-05 | End: 2023-07-05

## 2023-07-05 RX ORDER — ACETAMINOPHEN 325 MG/1
975 TABLET ORAL EVERY 8 HOURS
Qty: 100 TABLET | Refills: 0 | Status: SHIPPED | OUTPATIENT
Start: 2023-07-05 | End: 2023-07-13

## 2023-07-05 RX ORDER — LIDOCAINE 40 MG/G
CREAM TOPICAL
Status: DISCONTINUED | OUTPATIENT
Start: 2023-07-05 | End: 2023-07-10 | Stop reason: HOSPADM

## 2023-07-05 RX ORDER — OXYCODONE HYDROCHLORIDE 5 MG/1
5-10 TABLET ORAL EVERY 4 HOURS PRN
Qty: 26 TABLET | Refills: 0 | Status: SHIPPED | OUTPATIENT
Start: 2023-07-05

## 2023-07-05 RX ORDER — PROPOFOL 10 MG/ML
INJECTION, EMULSION INTRAVENOUS PRN
Status: DISCONTINUED | OUTPATIENT
Start: 2023-07-05 | End: 2023-07-05

## 2023-07-05 RX ORDER — FENTANYL CITRATE 50 UG/ML
25-100 INJECTION, SOLUTION INTRAMUSCULAR; INTRAVENOUS
Status: DISCONTINUED | OUTPATIENT
Start: 2023-07-05 | End: 2023-07-05 | Stop reason: HOSPADM

## 2023-07-05 RX ORDER — OXYCODONE HYDROCHLORIDE 5 MG/1
5 TABLET ORAL EVERY 4 HOURS PRN
Status: DISCONTINUED | OUTPATIENT
Start: 2023-07-05 | End: 2023-07-10 | Stop reason: HOSPADM

## 2023-07-05 RX ORDER — HALOPERIDOL 5 MG/ML
1 INJECTION INTRAMUSCULAR
Status: DISCONTINUED | OUTPATIENT
Start: 2023-07-05 | End: 2023-07-05 | Stop reason: HOSPADM

## 2023-07-05 RX ORDER — PROCHLORPERAZINE MALEATE 10 MG
10 TABLET ORAL EVERY 6 HOURS PRN
Status: DISCONTINUED | OUTPATIENT
Start: 2023-07-05 | End: 2023-07-10 | Stop reason: HOSPADM

## 2023-07-05 RX ADMIN — INDOMETHACIN 50 MG: 25 CAPSULE ORAL at 17:54

## 2023-07-05 RX ADMIN — SODIUM CHLORIDE, POTASSIUM CHLORIDE, SODIUM LACTATE AND CALCIUM CHLORIDE: 600; 310; 30; 20 INJECTION, SOLUTION INTRAVENOUS at 17:31

## 2023-07-05 RX ADMIN — BUPIVACAINE HYDROCHLORIDE AND EPINEPHRINE 5 ML: 5; 5 INJECTION, SOLUTION PERINEURAL at 07:26

## 2023-07-05 RX ADMIN — FENTANYL CITRATE 100 MCG: 50 INJECTION, SOLUTION INTRAMUSCULAR; INTRAVENOUS at 07:22

## 2023-07-05 RX ADMIN — MEPIVACAINE HYDROCHLORIDE 5 ML: 10 INJECTION, SOLUTION EPIDURAL; INFILTRATION at 07:29

## 2023-07-05 RX ADMIN — DEXTROSE MONOHYDRATE 900 MG: 50 INJECTION, SOLUTION INTRAVENOUS at 13:22

## 2023-07-05 RX ADMIN — HYDROMORPHONE HYDROCHLORIDE 0.4 MG: 0.2 INJECTION, SOLUTION INTRAMUSCULAR; INTRAVENOUS; SUBCUTANEOUS at 14:49

## 2023-07-05 RX ADMIN — HYDROMORPHONE HYDROCHLORIDE 0.3 MG: 1 INJECTION, SOLUTION INTRAMUSCULAR; INTRAVENOUS; SUBCUTANEOUS at 01:28

## 2023-07-05 RX ADMIN — FENTANYL CITRATE 50 MCG: 50 INJECTION, SOLUTION INTRAMUSCULAR; INTRAVENOUS at 07:53

## 2023-07-05 RX ADMIN — ONDANSETRON 4 MG: 2 INJECTION INTRAMUSCULAR; INTRAVENOUS at 08:22

## 2023-07-05 RX ADMIN — DEXTROSE MONOHYDRATE 900 MG: 50 INJECTION, SOLUTION INTRAVENOUS at 05:25

## 2023-07-05 RX ADMIN — LIDOCAINE HYDROCHLORIDE 3 ML: 10 INJECTION, SOLUTION INFILTRATION; PERINEURAL at 07:46

## 2023-07-05 RX ADMIN — OXYCODONE HYDROCHLORIDE 10 MG: 5 TABLET ORAL at 13:42

## 2023-07-05 RX ADMIN — PROPOFOL 20 MG: 10 INJECTION, EMULSION INTRAVENOUS at 07:56

## 2023-07-05 RX ADMIN — CEFTRIAXONE SODIUM 2 G: 2 INJECTION, POWDER, FOR SOLUTION INTRAMUSCULAR; INTRAVENOUS at 22:27

## 2023-07-05 RX ADMIN — BUPIVACAINE HYDROCHLORIDE AND EPINEPHRINE BITARTRATE 12.5 ML: 5; .005 INJECTION, SOLUTION PERINEURAL at 07:22

## 2023-07-05 RX ADMIN — MEPIVACAINE HYDROCHLORIDE 12.5 ML: 10 INJECTION, SOLUTION EPIDURAL; INFILTRATION at 07:25

## 2023-07-05 RX ADMIN — SODIUM CHLORIDE, POTASSIUM CHLORIDE, SODIUM LACTATE AND CALCIUM CHLORIDE: 600; 310; 30; 20 INJECTION, SOLUTION INTRAVENOUS at 07:42

## 2023-07-05 RX ADMIN — OXYCODONE HYDROCHLORIDE 5 MG: 5 TABLET ORAL at 00:18

## 2023-07-05 RX ADMIN — SODIUM CHLORIDE, POTASSIUM CHLORIDE, SODIUM LACTATE AND CALCIUM CHLORIDE: 600; 310; 30; 20 INJECTION, SOLUTION INTRAVENOUS at 13:21

## 2023-07-05 RX ADMIN — Medication 81 MG: at 21:03

## 2023-07-05 RX ADMIN — PROPOFOL 150 MCG/KG/MIN: 10 INJECTION, EMULSION INTRAVENOUS at 07:46

## 2023-07-05 RX ADMIN — DEXTROSE MONOHYDRATE 900 MG: 50 INJECTION, SOLUTION INTRAVENOUS at 21:46

## 2023-07-05 RX ADMIN — OXYCODONE HYDROCHLORIDE 5 MG: 5 TABLET ORAL at 05:58

## 2023-07-05 RX ADMIN — ACETAMINOPHEN 975 MG: 325 TABLET ORAL at 21:50

## 2023-07-05 RX ADMIN — SENNOSIDES AND DOCUSATE SODIUM 1 TABLET: 50; 8.6 TABLET ORAL at 21:03

## 2023-07-05 RX ADMIN — ACETAMINOPHEN 650 MG: 325 TABLET ORAL at 09:45

## 2023-07-05 RX ADMIN — INDOMETHACIN 50 MG: 25 CAPSULE ORAL at 01:28

## 2023-07-05 RX ADMIN — COLCHICINE 0.6 MG: 0.6 TABLET ORAL at 21:03

## 2023-07-05 RX ADMIN — HYDROMORPHONE HYDROCHLORIDE 0.4 MG: 0.2 INJECTION, SOLUTION INTRAMUSCULAR; INTRAVENOUS; SUBCUTANEOUS at 18:10

## 2023-07-05 RX ADMIN — Medication 81 MG: at 09:45

## 2023-07-05 RX ADMIN — MIDAZOLAM HYDROCHLORIDE 2 MG: 1 INJECTION, SOLUTION INTRAMUSCULAR; INTRAVENOUS at 07:22

## 2023-07-05 ASSESSMENT — ACTIVITIES OF DAILY LIVING (ADL)
ADLS_ACUITY_SCORE: 24
ADLS_ACUITY_SCORE: 22
ADLS_ACUITY_SCORE: 24
ADLS_ACUITY_SCORE: 25
ADLS_ACUITY_SCORE: 24
ADLS_ACUITY_SCORE: 25
ADLS_ACUITY_SCORE: 22
ADLS_ACUITY_SCORE: 24
ADLS_ACUITY_SCORE: 25
ADLS_ACUITY_SCORE: 24

## 2023-07-05 NOTE — ANESTHESIA PREPROCEDURE EVALUATION
"Anesthesia Pre-Procedure Evaluation    Patient: Tyler Miller   MRN: [id number] : 1980        Procedure : Procedure(s):  IRRIGATION AND DEBRIDEMENT, RIGHT THUMB, RIGHT SMALL FINGER  INCISION AND DRAINAGE, FOOT AND ANKLE. POSSIBLE ARTHROSCOPY          History reviewed. No pertinent past medical history.   History reviewed. No pertinent surgical history.   Allergies   Allergen Reactions     Allopurinol      \"Itching throughout body\"      Social History     Tobacco Use     Smoking status: Never     Smokeless tobacco: Not on file   Substance Use Topics     Alcohol use: Not Currently      Wt Readings from Last 1 Encounters:   23 81.6 kg (180 lb)        Anesthesia Evaluation   Pt has had prior anesthetic.         ROS/MED HX  ENT/Pulmonary:  - neg pulmonary ROS     Neurologic:  - neg neurologic ROS     Cardiovascular:     (+) hypertension-----    METS/Exercise Tolerance: >4 METS    Hematologic:  - neg hematologic  ROS     Musculoskeletal: Comment: gout  (+) arthritis,     GI/Hepatic:  - neg GI/hepatic ROS     Renal/Genitourinary:       Endo:     (+) Obesity (BMI 30),     Psychiatric/Substance Use:  - neg psychiatric ROS     Infectious Disease:       Malignancy:       Other:            Physical Exam    Airway        Mallampati: III   TM distance: > 3 FB   Neck ROM: full   Mouth opening: > 3 cm    Respiratory Devices and Support         Dental     Comment: good        Cardiovascular   cardiovascular exam normal          Pulmonary   pulmonary exam normal                OUTSIDE LABS:  CBC:   Lab Results   Component Value Date    WBC 12.7 (H) 2023    WBC 33.1 (H) 2023    HGB 10.2 (L) 2023    HGB 11.0 (L) 2023    HCT 33.4 (L) 2023    HCT 37.4 (L) 2023     2023     2023     BMP:   Lab Results   Component Value Date     (L) 2023     (L) 2023    POTASSIUM 3.8 2023    POTASSIUM 4.3 2023    CHLORIDE 101 2023    CHLORIDE " 99 07/03/2023    CO2 19 (L) 07/04/2023    CO2 19 (L) 07/03/2023    BUN 16.6 07/04/2023    BUN 17.4 07/03/2023    CR 1.02 07/04/2023    CR 1.13 07/03/2023     (H) 07/04/2023     (H) 07/03/2023     COAGS: No results found for: PTT, INR, FIBR  POC: No results found for: BGM, HCG, HCGS  HEPATIC:   Lab Results   Component Value Date    ALBUMIN 2.8 (L) 07/04/2023    PROTTOTAL 6.9 07/04/2023    ALT 52 07/04/2023    AST 27 07/04/2023    ALKPHOS 234 (H) 07/04/2023    BILITOTAL 1.3 (H) 07/04/2023     OTHER:   Lab Results   Component Value Date    LACT 3.8 (H) 07/03/2023    MANOJ 8.7 07/04/2023    CRP 0.4 03/29/2021    SED 58 (H) 07/03/2023       Anesthesia Plan    ASA Status:  2   NPO Status:  NPO Appropriate    Anesthesia Type: General.     - Airway: Mask Only   Induction: Intravenous, Propofol.   Maintenance: TIVA.        Consents    Anesthesia Plan(s) and associated risks, benefits, and realistic alternatives discussed. Questions answered and patient/representative(s) expressed understanding.     - Discussed: Risks, Benefits and Alternatives for BOTH SEDATION and the PROCEDURE were discussed     - Discussed with:  Patient,  ([facility] [place] [facility])      - Extended Intubation/Ventilatory Support Discussed: No.      - Patient is DNR/DNI Status: No         Postoperative Care    Pain management: Peripheral nerve block (Single Shot).   PONV prophylaxis: Ondansetron (or other 5HT-3), Dexamethasone or Solumedrol     Comments:    Other Comments: Left pop/saph nerve block for post op pain            Kathleen Goncalves MD

## 2023-07-05 NOTE — PROGRESS NOTES
06:10  Pt to pre-op for planned I&D of R-thumb by Dr Park, Hand surgery, and potentially L-foot and ankle I&D by Dr Gonzalez from Ortho surgery.  Pt NPO since MN.  Pain now 5/10 and pt is able to tolerate this amount of pain- gave PRN dose of oxycodone at 0600 to keep pain under control.

## 2023-07-05 NOTE — PROGRESS NOTES
Infectious Disease Progress Note  07/05/2023    Assessment/Plan  Impression:  1. Bacteremia-- Streptococcus pyogenes (Group A Streptococcus)   2. Severe tophaceous gout involving multiple joints: Hand, foot, see photos below  3. Septic arthritis- Secondary bacterial infection with gram-positive cocci--Blood and wound culture are growing Group A strep.   4. Severe leukocytosis, white count 33 K on admission  5. IRRIGATION AND DEBRIDEMENT arthroscopic INCISION AND DRAINAGE, FOOT AND ANKLE including left ankle arthroscopy and subtalar arthroscopy and debridement on 7/5/2023    6. Worsening pain, drainage over the last 3 days  7. Also having a lot of L ankle pain.  Synovial fluid aspirated from left ankle, cultures in process  8. History of hypertension and anemia.  Patient is on colchicine and Uloric prior to admission for gout              Recommendations     1. Ceftriaxone continues.  Discontinued vancomycin.  Added clindamycin.   2. De-escalate antibiotics based on final culture results  3. Follow blood cultures, CBC, CMP  4. Orthopedic consulted and S/P I&D  5. Patient remain hospitalized at least for 3 to 4 days depending on blood culture results, clinical response  6. May need IV antibiotics on discharge  7. Multiple skin lesions, would need dermatology as an outpatient       Principal Problem:    Acute idiopathic gout of right hand  Active Problems:    Chronic tophaceous gout    Cellulitis of finger of right hand    Acute idiopathic gout of left ankle      Kristyn Black MD  [facility]  Answering Service: [phone]  On-Call ID provider: [phone], option: 9      Subjective  Still with pain in L ankle.  Otherwise improving.   Overall better, continue antibiotics      Objective    Vital signs in last 24 hours  Temp:  [98  F (36.7  C)-99.1  F (37.3  C)] 98.2  F (36.8  C)  Pulse:  [85-99] 94  Resp:  [14-97] 19  BP: ()/(55-92) 95/65  SpO2:  [92 %-100 %] 94 %  Wt Readings from  Last 3 Encounters:   07/03/23 81.6 kg (180 lb)   03/29/21 91.2 kg (201 lb 1.3 oz)           Intake/Output last 3 shifts  I/O last 3 completed shifts:  In: 620 [P.O.:220; I.V.:400]  Out: 450 [Urine:450]  Intake/Output this shift:  I/O this shift:  In: 620 [P.O.:120; I.V.:500]  Out: 1 [Blood:1]    Review of Systems   Pertinent items are noted in HPI., otherwise negative.    Physical Exam  GENERAL APPEARANCE:  awake, mild distress at time of movement  EYES: Eyes grossly normal to inspection, conjunctivae and sclerae normal  HENT: mouth without ulcers or lesions  NECK: supple  RESP: normal breathing pattern, clear to auscultation  CV: regular rates and rhythm, S1 S2  LYMPHATICS: Notable toes finger swelling  ABDOMEN: soft, nontender, nondistended  MS: extremities normal-patient is gout gross deformities noted-see above--still significant pain and swelling in Left ankle.   SKIN: Lesions, open wounds on right thumb, fingers, left middle finger edematous, multiple toes with gout and ulceration, tophaceous gout of multiple extremities  Papular brown discolored lesions on thighs, per patient has been there for several months  From patient's chart                    Pertinent Labs   Lab Results: personally reviewed.     Recent Labs   Lab 07/04/23 0623 07/03/23  0736 07/03/23  0142   WBC 12.7* 33.1* 20.3*   HGB 10.2* 11.0* 11.3*   HCT 33.4* 37.4* 37.1*    386 439        Recent Labs   Lab 07/04/23  0623 07/03/23  0736 07/03/23  0142   * 133* 133*   CO2 19* 19* 20*   BUN 16.6 17.4 13.9     No results found for: CULT  7-Day Micro Results     Collected Updated Procedure Result Status      07/05/2023 0823 07/05/2023 0844 Anaerobic Bacterial Culture Routine [[id number]]   Swab from Foot, Left    In process Component Value   No component results               07/05/2023 0823 07/05/2023 1058 Gram Stain [[id number]]   Swab from Foot, Left    Preliminary result Component Value   Gram Stain Result No organisms seen  [P]     Gram stain result is preliminary and awaits review of Microbiology staff            07/05/2023 0823 07/05/2023 0844 Fungal or Yeast Culture Routine [[id number]]   Swab from Foot, Left    In process Component Value   No component results               07/05/2023 0823 07/05/2023 0844 Swab Aerobic Bacterial Culture Routine [[id number]]   Swab from Foot, Left    In process Component Value   No component results               07/05/2023 0809 07/05/2023 0819 Anaerobic Bacterial Culture Routine [[id number]]   Wound from Foot, Left    In process Component Value   No component results               07/05/2023 0809 07/05/2023 0819 Gram Stain [[id number]]   Wound from Foot, Left    In process Component Value   No component results            07/05/2023 0809 07/05/2023 0819 Wound Aerobic Bacterial Culture Routine [[id number]]   Wound from Foot, Left    In process Component Value   No component results               07/05/2023 0800 07/05/2023 0821 Anaerobic Bacterial Culture Routine [[id number]]   Wound from Hand, Right    In process Component Value   No component results               07/05/2023 0800 07/05/2023 0821 Gram Stain [[id number]]   Wound from Hand, Right    In process Component Value   No component results            07/05/2023 0800 07/05/2023 0821 Wound Aerobic Bacterial Culture Routine [[id number]]   Wound from Hand, Right    In process Component Value   No component results               07/04/2023 1413 07/04/2023 1535 Joint Fluid Exam [[id number]]    (Abnormal)   Synovial fluid from Ankle, Left    Final result Component Value   No component results            07/04/2023 1413 07/04/2023 1901 Cell count with differential fluid [[id number]]    (Abnormal)   Synovial fluid from Ankle, Left    Final result Component Value   No component results            07/04/2023 1413 07/04/2023 1449 Anaerobic Bacterial Culture Routine [[id number]]   Synovial fluid from Ankle, Left    In process Component  Value   No component results               07/04/2023 1413 07/04/2023 1449 Synovial fluid Aerobic Bacterial Culture Routine [[id number]]   Synovial fluid from Ankle, Left    In process Component Value   No component results               07/04/2023 1413 07/04/2023 2209 Gram Stain [[id number]]   (Abnormal)   Synovial fluid from Ankle, Left    Final result Component Value   Gram Stain Result 3+ Gram positive cocci   Gram Stain Result 3+ WBC seen   Predominantly PMNs            07/04/2023 1413 07/04/2023 1535 Crystal ID Synovial Fluid [[id number]]   (Abnormal)   Synovial fluid from Ankle, Left    Final result Component Value   Crystals Analysis Positive for extracellular crystals, consistent with monosodium urate crystals.            07/04/2023 1413 07/04/2023 1624 Cell Count Body Fluid [[id number]]    (Abnormal)   Synovial fluid from Ankle, Left    Final result Component Value Units   Color Red    Clarity Hazy    Cell Count Fluid Source Ankle, Left    Total Nucleated Cells 50,340 /uL            07/04/2023 1413 07/04/2023 1901 Differential Body Fluid [[id number]]    Synovial fluid from Ankle, Left    Final result Component Value   % Neutrophils --   % Lymphocytes --   % Monocyte/Macrophages --            07/03/2023 0318 07/03/2023 1032 MRSA MSSA PCR, Nasal Swab [[id number]]    Swab from Nose    Final result Component Value   MRSA Target DNA Negative   SA Target DNA Positive            07/03/2023 0302 07/05/2023 0723 Blood Culture Peripheral Blood [[id number]]   (Abnormal)   Peripheral Blood    Final result Component Value   Culture Positive on the 1st day of incubation    Streptococcus pyogenes (Group A Streptococcus)    2 of 2 bottles    Susceptibilities done on previous cultures               07/03/2023 0242 07/05/2023 1041 Blood Culture Peripheral Blood [[id number]]    (Abnormal)   Peripheral Blood    Final result Component Value   Culture Positive on the 1st day of incubation    Streptococcus  pyogenes (Group A Streptococcus)    2 of 2 bottles        Susceptibility      Streptococcus pyogenes (Group A Streptococcus)      DOUGLAS      Ampicillin <=0.06 ug/mL Susceptible      Cefotaxime <=0.25 ug/mL Susceptible      Ceftriaxone <=0.25 ug/mL Susceptible      Clindamycin <=0.06 ug/mL Susceptible      Meropenem <=0.06 ug/mL Susceptible      Penicillin <=0.03 ug/mL Susceptible      Vancomycin 0.25 ug/mL Susceptible                           07/03/2023 0242 07/03/2023 1857 Verigene GP Panel [[id number]]    (Abnormal)   Peripheral Blood    Final result Component Value   Staphylococcus species Not Detected   Staphylococcus aureus Not Detected   Staphylococcus epidermidis Not Detected   Staphylococcus lugdunensis Not Detected   Enterococcus faecalis Not Detected   Enterococcus faecium Not Detected   Streptococcus agalactiae Not Detected   Streptococcus anginosus group Not Detected   Streptococcus pneumoniae Not Detected   Streptococcus pyogenes Detected   Positive for Streptococcus pyogenes (Group A Streptococcus) by Cytonicsigene multiplex nucleic acid test. Penicillin and ampicillin are drugs of choice; non-susceptible isolates have not been reported. Final identification and antimicrobial susceptibility testing will be verified by standard methods.   Listeria species Not Detected            07/03/2023 0142 07/05/2023 0745 Abscess Aerobic Bacterial Culture Routine with Gram Stain [[id number]]   (Abnormal)   Abscess from Thumb, Right    Final result Component Value   Culture 2+ Streptococcus pyogenes (Group A Streptococcus)    This organism is susceptible to ampicillin, penicillin, vancomycin and the cephalosporins. If treatment is required and your patient is allergic to penicillin, contact the microbiology lab within 5 days to request susceptibility testing.   Gram Stain Result 1+ Gram positive cocci    2+ WBC seen                     Pertinent Radiology   Radiology Results:     POC US Guidance Needle  "Placement    Result Date: 7/5/2023  Ultrasound was performed as guidance to an anesthesia procedure.  Click \"PACS images\" hyperlink below to view any stored images.  For specific procedure details, view procedure note authored by anesthesia.    US Joint Injection Aspiration Major Left    Result Date: 7/4/2023  EXAM: 1. PUNCTURE AND ASPIRATION LEFT ANKLE JOINT 2. ULTRASOUND GUIDANCE LOCATION: [facility] DATE: 7/4/2023 INDICATION: left ankle  known gout, also Strep bacteremia, evaluate for septic arthritis. PROCEDURE: Informed consent obtained. Time out performed. The site was prepped and draped in sterile fashion. 10 mL of 1% lidocaine was infused into the local soft tissues. Under direct ultrasound guidance, a 22 gauge needle was placed into the fluid pocket and 5 ml of brown, cloudy fluid was aspirated. This was sent for cultures.     IMPRESSION: 1.  Status post ultrasound-guided puncture and aspiration of the left ankle joint.     MR Foot Left w/o & w Contrast    Result Date: 7/4/2023  EXAM: MR FOOT LEFT W/O and W CONTRAST, MR ANKLE LEFT W/O and W CONTRAST LOCATION: [facility] DATE: 7/4/2023 INDICATION: left ankle pain, erosion of 2nd MT and phalanx, RO septic arthritis, gout, vs osteo COMPARISON: None. TECHNIQUE: Routine. Additional postgadolinium T1 sequences were obtained. IV CONTRAST: 8ml Gadavist FINDINGS: JOINTS AND BONES: -Extensive erosive osseous changes are seen in the left foot and ankle most pronounced in the first MTP joint as well as the second metatarsal head with additional erosive foci are seen in the midfoot, second mid and distal phalanx, with large proliferative erosive changes seen in the posterior subtalar joints and posterior aspect of the talar. These proliferative erosive soft tissue masses are relatively hypointense on T1 and isointense on T2-weighted heterogeneous enhancement on postcontrast  imaging. The bone marrow adjacent to the " erosive masses is not significantly edematous or enhancing. TENDONS: -There is mild thickening and increased T2 signal of the distal aspect of the Achilles tendon (image 12, series 12). LIGAMENTS: -Lisfranc ligament: Intact. No subluxation. MUSCLES AND SOFT TISSUES: -Extensive large soft tissue masses is are seen in throughout the forefoot and hindfoot, which are low on T1 signal and intermediate T2 signal with variable postcontrast enhancement.     IMPRESSION: 1.  Multifocal extensive large soft tissue masses with underlying osseous erosion seen in the forefoot and hindfoot, most pronounced at the first MTP joint and second metatarsal head as well as in the posterior aspect of the ankle joint, favored reflect large gouty tophi given the patient's history and enhancement characteristics. 2.  No evidence of drainable soft tissue abscess or definitive evidence of osteomyelitis is seen. 3.  Mild Achilles tendinosis    MR Ankle Left w/o & w Contrast    Result Date: 7/4/2023  EXAM: MR FOOT LEFT W/O and W CONTRAST, MR ANKLE LEFT W/O and W CONTRAST LOCATION: [facility] DATE: 7/4/2023 INDICATION: left ankle pain, erosion of 2nd MT and phalanx, RO septic arthritis, gout, vs osteo COMPARISON: None. TECHNIQUE: Routine. Additional postgadolinium T1 sequences were obtained. IV CONTRAST: 8ml Gadavist FINDINGS: JOINTS AND BONES: -Extensive erosive osseous changes are seen in the left foot and ankle most pronounced in the first MTP joint as well as the second metatarsal head with additional erosive foci are seen in the midfoot, second mid and distal phalanx, with large proliferative erosive changes seen in the posterior subtalar joints and posterior aspect of the talar. These proliferative erosive soft tissue masses are relatively hypointense on T1 and isointense on T2-weighted heterogeneous enhancement on postcontrast  imaging. The bone marrow adjacent to the erosive masses is not significantly edematous  or enhancing. TENDONS: -There is mild thickening and increased T2 signal of the distal aspect of the Achilles tendon (image 12, series 12). LIGAMENTS: -Lisfranc ligament: Intact. No subluxation. MUSCLES AND SOFT TISSUES: -Extensive large soft tissue masses is are seen in throughout the forefoot and hindfoot, which are low on T1 signal and intermediate T2 signal with variable postcontrast enhancement.     IMPRESSION: 1.  Multifocal extensive large soft tissue masses with underlying osseous erosion seen in the forefoot and hindfoot, most pronounced at the first MTP joint and second metatarsal head as well as in the posterior aspect of the ankle joint, favored reflect large gouty tophi given the patient's history and enhancement characteristics. 2.  No evidence of drainable soft tissue abscess or definitive evidence of osteomyelitis is seen. 3.  Mild Achilles tendinosis    XR Foot 3 Views Standing Bilateral    Result Date: 7/3/2023  EXAM: XR FOOT 3 VIEWS STANDING BILATERAL LOCATION: [facility] DATE: 7/3/2023 INDICATION: Chronic bilateral foot wounds. Evaluate osteomyelitis or occult pathology. History of gout. COMPARISON: None.     IMPRESSION: Marked erosive/destructive changes involving the right second MTP joint with extensive destruction of the second metatarsal head and neck as well as the majority of the proximal middle phalanges most likely secondary to gout with marked surrounding soft tissue swelling. Mild periarticular erosive changes involving the right third and fourth TMT and third MTP joint. In the left foot, there are marked periarticular erosive changes involving the MTP joint of the great toe with marked surrounding soft tissue swelling most consistent with gout. Mild periarticular erosive changes involving the second MTP and third TMT joints.     XR Finger Right G/E 2 Views    Result Date: 7/3/2023  EXAM: XR FINGER RIGHT G/E 2 VIEWS LOCATION: [facility]  DATE: 7/3/2023 INDICATION: right thumb pain, h o gout COMPARISON: 03/29/2021     IMPRESSION: Marked soft tissue swelling of the proximal soft tissues of the thumb with periarticular osteopenia which likely reflect the sequela of patient's known gout. There is slight subluxation of the distal phalanx of the thumb relative to the proximal phalanx, though no evidence of a displaced fracture or dislocation.     XR Ankle Left G/E 3 Views    Result Date: 7/3/2023  EXAM: XR ANKLE LEFT G/E 3 VIEWS LOCATION: [facility] DATE: 7/3/2023 INDICATION: left ankle pain, h o gout COMPARISON: None.     IMPRESSION: No acute fracture or erosion. No foreign body.    Total Time Spent 35 minutes with >50% of the time spent in chart review, evaluation, management, counseling, education and care coordination.

## 2023-07-05 NOTE — PROGRESS NOTES
ORTHOPEDIC L/E PROGRESS NOTE  Procedure(s):  IRRIGATION AND DEBRIDEMENT, RIGHT THUMB, RIGHT SMALL FINGER  INCISION AND DRAINAGE, FOOT AND ANKLE. POSSIBLE ARTHROSCOPY on 7/3/2023 - 7/5/2023.   Day of Surgery    This is a 42-year-old gentleman with a longstanding history of tophaceous gout.  He previously had removal of tophaceous gout deposits from his right thumb IP joint as well as his left first MTP joint.  He has multiple tophaceous gout deposits.  He was admitted to hospital with increasing discomfort in both the right thumb and left ankle.  Aspirate of the left ankle demonstrated monosodium urate crystals with gram-positive cocci on Gram stain and cell count of 50,000 white blood cells.  He also had a positive blood culture.  Based on these findings we discussed irrigation debridement of his left ankle for presumed septic arthropathy.    Temp:  [98.5  F (36.9  C)-99.1  F (37.3  C)] 99.1  F (37.3  C)  Pulse:  [85-99] 93  Resp:  [16-18] 18  BP: ()/(55-83) 125/83  SpO2:  [95 %-99 %] 96 %    Denies chest pain and shortness of breath    Lab Results   Component Value Date    HGB 10.2 (L) 07/04/2023    HGB 11.0 (L) 07/03/2023    HGB 11.3 (L) 07/03/2023       EXAM   He is alert and oriented x3.  He is well-appearing.   Sensation seems to be grossly intact to light touch throughout both lower extremities.  He is able to flex and extend his toes.  Dorsalis pedis and posterior tibialis pulses are palpable.  Motor exam is 5 of 5 in inversion, eversion, dorsiflexion and plantarflexion.  Calves are soft and nontender.  The left ankle is swollen and tender and erythematous.  There is increased warmth at the level of the ankle.  He does not seem to have any erythema or increased warmth involving the forefoot.  He has significant extensive tophaceous gout deposits involving all of his toes particularly the first MTP joint.  He has a well-healed medial incision without fluctuance.  There is warmth involving the ankle as  well as extending to the lateral subtalar region/sinus tarsi.    Imaging:  IMPRESSION:  1.  Multifocal extensive large soft tissue masses with underlying osseous erosion seen in the forefoot and hindfoot, most pronounced at the first MTP joint and second metatarsal head as well as in the posterior aspect of the ankle joint, favored reflect   large gouty tophi given the patient's history and enhancement characteristics.  2.  No evidence of drainable soft tissue abscess or definitive evidence of osteomyelitis is seen.  3.  Mild Achilles tendinosis    ASSESSMENT   probable infected left ankle possible involvement of the subtalar joint.  IRRIGATION AND DEBRIDEMENT, RIGHT THUMB, RIGHT SMALL FINGER  INCISION AND DRAINAGE, FOOT AND ANKLE. POSSIBLE ARTHROSCOPY    PLAN  Plan is to proceed with irrigation debridement of the left ankle possible subtalar joint.  Risks, benefits and alternatives were discussed.  Patient wishes to proceed with surgery.  Informed consent was obtained.  We discussed the possibility of debridement of his tophaceous gout deposits in his forefoot.  I think it would be prudent to wait until this infection is cleared rather than the risk contamination of those sites.  This certainly could be contemplated in a staged fashion in a later date.    Mary Gonzalez MD  [facility]

## 2023-07-05 NOTE — ANESTHESIA PROCEDURE NOTES
Sciatic Procedure Note    Pre-Procedure   Staff -        Anesthesiologist:  Kathleen Goncalves MD       Performed By: anesthesiologist       Location: pre-op       Procedure Start/Stop Times: 7/5/2023 7:22 AM and 7/5/2023 7:27 AM       Pre-Anesthestic Checklist: patient identified, IV checked, site marked, risks and benefits discussed, informed consent, monitors and equipment checked, pre-op evaluation, at physician/surgeon's request and post-op pain management  Timeout:       Correct Patient: Yes        Correct Procedure: Yes        Correct Site: Yes        Correct Position: Yes        Correct Laterality: Yes        Site Marked: Yes  Procedure Documentation  Procedure: Sciatic       Laterality: left       Patient Position: supine       Patient Prep/Sterile Barriers: sterile gloves, mask       Skin prep: Chloraprep (popliteal approach).       Needle Type: other (Stimuplex, echogenic)       Needle Gauge: 20.        Needle Length (Inches): 4        Ultrasound guided       1. Ultrasound was used to identify targeted nerve, plexus, vascular marker, or fascial plane and place a needle adjacent to it in real-time.       2. Ultrasound was used to visualize the spread of anesthetic in close proximity to the above referenced structure.       3. A permanent image is entered into the patient's record.       4. The visualized anatomic structures appeared normal.       5. There were no apparent abnormal pathologic findings.    Assessment/Narrative         The placement was negative for: blood aspirated, painful injection and site bleeding       Paresthesias: No.       Bolus given via needle..        Secured via.        Complications: none       Injection made incrementally with aspirations every 5 mL.    Medication(s) Administered   Bupivacaine 0.5% w/ 1:200K Epi (Other) - Other   12.5 mL - 7/5/2023 7:22:00 AM  Medication Administration Time: 7/5/2023 7:22 AM      FOR [facility] ([facility] [place]) ONLY:   Pain Team Contact information:  "please page the Pain Team Via [facility]. Search \"Pain\". During daytime hours, please page the attending first. At night please page the resident first.      "

## 2023-07-05 NOTE — OP NOTE
Preoperative diagnosis:  Acute idiopathic gout of right hand [M10.041]  Cellulitis of finger of right hand [L03.011]     Postoperative diagnosis:  Same.    Procedure:  Procedure(s):  IRRIGATION AND DEBRIDEMENT, RIGHT THUMB, RIGHT SMALL FINGER    Surgeon:  Jermaine Park MD    Assistant:  None.     Anesthesia:  MAC with Local     EBL:  None.    Findings:  Purulent fluid as well as gouty tophus in the thumb IP joint.  Gouty tophus in the right small finger MP joint.    Specimens:  Cultures from the right small finger MP joint.    Complications:  None.    Condition:  Stable to the recovery room.    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  He presented with pain and swelling of the right thumb as well as drainage.  Cultures showed group A strep.  The patient was indicated for debridement of the right thumb.  He also complained of a fluid collection in the right small finger MP joint.  He was also indicated for debridement of the right small finger.  The risks, benefits, and alternatives of the procedure were discussed with the patient with a Hmong speaking .  The patient understood and wished to proceed with surgery.    Procedure:  After the appropriate consent was obtained, the patient was brought to the operating room and placed on the operating table in the supine position.  The right wrist and hand were prepped and draped in usual sterile fashion.  Pause for site verification was performed.  Local anesthesia was administered to both surgical sites without complication.  Attention was first turned to the right small finger.  A small longitudinal incision was made dorsally over the MP joint over the swollen area.  Dissection was carried sharply through the skin.  Blunt dissection was carried through the subcutaneous tissues.  A gouty tophus was encountered in the subcutaneous tissues.  Cultures were taken from this  area.  The pelvis was debrided with a rongeur.  The wound was copiously irrigated with sterile saline.  The incision was loosely reapproximated with a 4-0 nylon suture.    Attention was turned to the thumb.  A small longitudinal incision was made dorsally over the IP joint.  Dissection was carried sharply through the skin.  Blunt dissection was carried through the subcutaneous tissues.  There was gross purulent fluid here as well as a gouty tophus.  The gouty tophus was debrided with a rongeur.  The wound was copiously irrigated with sterile saline.  The wound was left open to drain.  Sterile dressings were applied to both wounds.      The patient also underwent a left ankle arthroscopy and debridement by Dr. Gonzalez.  This was dictated as a separate procedure.      The patient was then brought to the recovery room awake and in good condition.      Postoperative plan:  The patient will remain on IV antibiotics.  He will start doing soaks for the right hand in warm soapy water for 5 to 10 minutes, 2-3 times daily.    Jermaine Park MD

## 2023-07-05 NOTE — PROGRESS NOTES
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  Patient admitted for acute gout flareup, septic arthritis.    Severe tophaceous gout involving multiple joints with acute flareup;  -- Continue twice daily colchicine, still ongoing issues with pain, added as needed indomethacin, may need to schedule if continued to have ongoing pain  -- On admission uric acid 8.0.  PTA Uloric  -- Appreciated orthopedic input    Septic arthritis, likely left ankle and right thumb;  Streptococcus pyogenes bacteremia;  MRI left foot and ankle reported no evidence of drainable soft tissue abscess or definite evidence of osteomyelitis  -- BC from 7/3 positive for Streptococcus pyogenes.  --On 7/4, left ankle aspiration reported gram-positive cocci and urate crystals.  --On 7/5, I&D left ankle and right thumb by orthopedic surgeons.  Follow on cultures  --IV vancomycin changed to IV clindamycin.  On IV Rocephin.  Appreciated ID input.  --WBC count improving    Hyponatremia; Fairly stable.  Trend    Chronic anemia; fairly stable despite IV fluid.  Work-up through PCP as an outpatient         Diet: Advance Diet as Tolerated: Regular Diet Adult    DVT Prophylaxis: Orthopedic surgeon initiated patient on twice daily aspirin for DVT prophylaxis, discontinue Lovenox  Senior Catheter: Not present  Lines: None     Cardiac Monitoring: None  Code Status: Full Code      Clinically Significant Risk Factors              # Hypoalbuminemia: Lowest albumin = 2.8 g/dL at 7/4/2023  6:23 AM, will monitor as appropriate     # Hypertension: Noted on problem list        # Obesity: Estimated body mass index is 30.9 kg/m  as calculated from the following:    Height as of  "this encounter: 1.626 m (5' 4\").    Weight as of this encounter: 81.6 kg (180 lb)., PRESENT ON ADMISSION          Disposition Plan     Expected Discharge Date: 07/05/2023      Destination: home with family            Jaxon MD NIMA  Hospitalist Service  [facility]  Securely message with [employer] (more info)  Text page via [employer] Paging/Directory   ______________________________________________________________________    Interval History   Patient seen and examined.  Notes, labs, imaging report personally reviewed.  Patient underwent I&D left ankle and right thumb today.  Postoperative pain currently fairly stable.  Patient denied feeling fever or chills.  Denies short of breath or chest pain.  Patient denied other joint pains especially on the left hand and right ankle and foot.  Discussed with nursing staffs.    Physical Exam   Vital Signs: Temp: 98.2  F (36.8  C) Temp src: Oral BP: 95/65 Pulse: 94   Resp: 19 SpO2: 94 % O2 Device: None (Room air) Oxygen Delivery: 2 LPM  Weight: 180 lbs 0 oz      General: Not in obvious distress.  HEENT: Normocephalic, supple neck  Chest: Clear to auscultation bilateral anteriorly, no wheezing  Heart: S1S2 normal, regular  Abdomen: Soft. NT, ND. Bowel sounds- active.  Extremities: No legs swelling.  Musculoskeletal; joints deformity due to severe tophaceous gout.  Dressing/Ace wrap on right hand and left ankle  Neuro: alert and awake, grossly non-focal          Medical Decision Making             Data     I have personally reviewed the following data over the past 24 hrs:    N/A  \   N/A   / N/A     N/A N/A N/A /  105 (H)   4.1 N/A 1.11 \       Imaging results reviewed over the past 24 hrs:   Recent Results (from the past 24 hour(s))   US Joint Injection Aspiration Major Left    Narrative    EXAM:  1. PUNCTURE AND ASPIRATION LEFT ANKLE JOINT  2. ULTRASOUND GUIDANCE  LOCATION: [facility]  DATE: 7/4/2023    INDICATION: left ankle  known " "gout, also Strep bacteremia, evaluate for septic arthritis.    PROCEDURE: Informed consent obtained. Time out performed. The site was prepped and draped in sterile fashion. 10 mL of 1% lidocaine was infused into the local soft tissues. Under direct ultrasound guidance, a 22 gauge needle was placed into the fluid   pocket and 5 ml of brown, cloudy fluid was aspirated. This was sent for cultures.       Impression    IMPRESSION:  1.  Status post ultrasound-guided puncture and aspiration of the left ankle joint.     POC US Guidance Needle Placement    Narrative    Ultrasound was performed as guidance to an anesthesia procedure.  Click   \"PACS images\" hyperlink below to view any stored images.  For specific   procedure details, view procedure note authored by anesthesia.     "

## 2023-07-05 NOTE — ANESTHESIA PROCEDURE NOTES
Adductor canal Procedure Note    Pre-Procedure   Staff -        Anesthesiologist:  Kathleen Goncalves MD       Performed By: anesthesiologist       Location: pre-op       Procedure Start/Stop Times: 7/5/2023 7:26 AM and 7/5/2023 7:29 AM       Pre-Anesthestic Checklist: patient identified, IV checked, site marked, risks and benefits discussed, informed consent, monitors and equipment checked, pre-op evaluation, at physician/surgeon's request and post-op pain management  Timeout:       Correct Patient: Yes        Correct Procedure: Yes        Correct Site: Yes        Correct Position: Yes        Correct Laterality: Yes        Site Marked: Yes  Procedure Documentation  Procedure: Adductor canal       Laterality: left       Patient Position: supine       Patient Prep/Sterile Barriers: sterile gloves, mask       Skin prep: Chloraprep       Needle Type: other (Echogenic Stimuplex)       Needle Gauge: 20.        Needle Length (Inches): 4        Ultrasound guided       1. Ultrasound was used to identify targeted nerve, plexus, vascular marker, or fascial plane and place a needle adjacent to it in real-time.       2. Ultrasound was used to visualize the spread of anesthetic in close proximity to the above referenced structure.       3. A permanent image is entered into the patient's record.       4. The visualized anatomic structures appeared normal.       5. There were no apparent abnormal pathologic findings.    Assessment/Narrative         The placement was negative for: blood aspirated, painful injection and site bleeding       Paresthesias: No.       Bolus given via needle..        Secured via.        Insertion/Infusion Method: Single Shot       Complications: none       Injection made incrementally with aspirations every 5 mL.    Medication(s) Administered   Bupivacaine 0.5% w/ 1:200K Epi (Other) - Other   5 mL - 7/5/2023 7:26:00 AM  Medication Administration Time: 7/5/2023 7:26 AM      FOR [facility] ([facility]) ONLY:    "Pain Team Contact information: please page the Pain Team Via [facility]. Search \"Pain\". During daytime hours, please page the attending first. At night please page the resident first.      "

## 2023-07-05 NOTE — ANESTHESIA CARE TRANSFER NOTE
Patient: Tyler Miller    Procedure: Procedure(s):  IRRIGATION AND DEBRIDEMENT, RIGHT THUMB, RIGHT SMALL FINGER  INCISION AND DRAINAGE, FOOT AND ANKLE. POSSIBLE ARTHROSCOPY       Diagnosis: Acute idiopathic gout of right hand [M10.041]  Cellulitis of finger of right hand [L03.011]  Diagnosis Additional Information: No value filed.    Anesthesia Type:   General     Note:    Oropharynx: oropharynx clear of all foreign objects and spontaneously breathing  Level of Consciousness: awake  Oxygen Supplementation: room air    Independent Airway: airway patency satisfactory and stable  Dentition: dentition unchanged  Vital Signs Stable: post-procedure vital signs reviewed and stable  Report to RN Given: handoff report given  Patient transferred to: Medical/Surgical Unit  Comments: Report called to RN on P1, questions answered. Patient transported by aide on room air.   Handoff Report: Identifed the Patient, Identified the Reponsible Provider, Reviewed the pertinent medical history, Discussed the surgical course, Reviewed Intra-OP anesthesia mangement and issues during anesthesia, Set expectations for post-procedure period and Allowed opportunity for questions and acknowledgement of understanding      Vitals:  Vitals Value Taken Time   /60 07/05/23 0852   Temp 36.7  C (98.1  F) 07/05/23 0852   Pulse 87 07/05/23 0852   Resp 14 07/05/23 0852   SpO2 92 % 07/05/23 0852       Electronically Signed By: FAY Bolivar CRNA  July 5, 2023  8:53 AM

## 2023-07-05 NOTE — PLAN OF CARE
Problem: Plan of Care - These are the overarching goals to be used throughout the patient stay.    Goal: Plan of Care Review  Description: The Plan of Care Review/Shift note should be completed every shift.  The Outcome Evaluation is a brief statement about your assessment that the patient is improving, declining, or no change.  This information will be displayed automatically on your shift note.  Outcome: Progressing   Goal Outcome Evaluation:       Pt A&O. Pt reports pain in his left ankle. Indomethacin and tylenol given for pain. Surgeon came and spoke with pt about having an I&D on his right thumb tomorrow. Pt also might have surgery on his left ankle tomorrow too. NPO at midnight for surgery. Explained this to pt with [facility] interpretor. NS in kcl 50ml/hr. Getting IV antibiotics.       Yuko Hou RN

## 2023-07-05 NOTE — ANESTHESIA POSTPROCEDURE EVALUATION
Patient: Tyler Miller    Procedure: Procedure(s):  IRRIGATION AND DEBRIDEMENT, RIGHT THUMB, RIGHT SMALL FINGER  INCISION AND DRAINAGE, FOOT AND ANKLE. POSSIBLE ARTHROSCOPY       Anesthesia Type:  General    Note:  Disposition: Inpatient   Postop Pain Control: Uneventful            Sign Out: Well controlled pain   PONV: No   Neuro/Psych: Uneventful            Sign Out: Acceptable/Baseline neuro status   Airway/Respiratory: Uneventful            Sign Out: Acceptable/Baseline resp. status   CV/Hemodynamics: Uneventful            Sign Out: Acceptable CV status; No obvious hypovolemia; No obvious fluid overload   Other NRE: NONE   DID A NON-ROUTINE EVENT OCCUR? No           Last vitals:  Vitals:    07/05/23 1004 07/05/23 1027 07/05/23 1125   BP: 101/71 111/63 95/65   Pulse: 92 99 94   Resp: 18 18 19   Temp: 36.8  C (98.3  F) 36.9  C (98.5  F) 36.8  C (98.2  F)   SpO2: 93% 94% 94%       Electronically Signed By: Kathleen Goncalves MD  July 5, 2023  3:25 PM

## 2023-07-05 NOTE — PROGRESS NOTES
[facility] Orthopedic Progress Note         Assessment and Plan:    Assessment:   Chronic tophaceous gout with a septic right thumb IP joint.          Plan:   I had a lengthy discussion today with the patient about his treatment plan and treatment options.  A Hmong speaking  was available via a language line.  The patient has had persistent drainage from the right thumb.  His blood cultures were positive for group A strep.  The patient is indicated for irrigation and debridement of the right thumb.  The risks, benefits, and alternatives of the procedure were discussed at length with the patient, who understands, and wishes to proceed with surgery.  I will discuss with my partners to see if they need to do a left foot and ankle procedure at the same time.           Interval History:   Stable.  The patient states that his pain is about the same.  He states that his drainage is still about the same.            Significant Problems:   The patient states that his left foot is still his most painful problem.  The status of the right thumb is a little bit painful, but not that bad.          Review of Systems:   The patient denies any chest pain, shortness of breath, nausea or vomiting.          Medications:   All medications related to the patient's surgery have been reviewed          Physical Exam:   Vitals were reviewed  Temp: 98.5  F (36.9  C) Temp src: Oral BP: 102/62 Pulse: 85   Resp: 16 SpO2: 99 % O2 Device: None (Room air)    The right thumb still has diffuse swelling circumferentially around the IP joint.  There is no drainage able to be expressed today.  There are currently no open wounds.  There is a small eschar dorsally over the IP joint.  The tophi over the rest of his hand are stable.  There is minimal erythema or tenderness over those..  Surrounding skin with minimal erythema.           Data:   All laboratory data related to this surgery reviewed  Blood cultures grew  group A strep, sensitive to vancomycin and the penicillins.       Jermaine Park MD

## 2023-07-05 NOTE — OP NOTE
Operative Note    Name:  Tyler Miller  PCP:  TOMÁS CARRIZALES  Procedure Date:  7/3/2023 - 7/5/2023    Pre-Procedure Diagnosis:  Left ankle gout possible infection    Post-Procedure Diagnosis:    Same     Procedure: Procedure(s):  IRRIGATION AND DEBRIDEMENT arthroscopic INCISION AND DRAINAGE, FOOT AND ANKLE including left ankle arthroscopy and subtalar arthroscopy and debridement    Surgeon(s):    Mary Gonzalez MD    Circulator: Jazmin Beverly RN  Scrub Person: Yessenia Osman  Staff Assist: Schoenecker, Carla J, RN   A PAC was necessary to ensure safety of this patient and adequate progression of the procedure.    Anesthesia Type:  MAC with popliteal saphenous block  Estimated Blood Loss:   1 cc    Complications:    None    Procedure: After being informed of the risks, benefits, and alternatives to the procedure, the patient desired to proceed and was brought to the operating suite where the patient was placed under moderate sedation.  A preoperative nerve block was performed for postoperative pain control.  The left leg was then prepped and draped in usual sterile fashion.  A timeout was called.  I began by elevating the left leg for approximately 5 minutes.  Tourniquet was inflated to 250 mmHg.  I began by creating a anteromedial portal just medial to tibialis anterior using a nick and spread technique.  I did localize with a 18-gauge needle and injected 20 cc of normal saline.  The arthroscope was then introduced.  An 18-gauge needle was used to localize the anterolateral portal location just lateral to peroneus tertius.  A 3.5 mm shaver was introduced after creating the anterolateral portal using a nick and spread technique.  A swab was sent for culture.  The ankle joint showed significant pockmark ulcerations of the articular surface but otherwise relatively intact articular surfaces.  The fluid was cloudy and there were significant chalky deposits within the ankle joint.  The joint was debrided using a 3.5 mm  shaver and irrigated copiously with normal saline.    I then created a lateral portal over the subtalar joint and the arthroscope was introduced.  There was direct outflow from the lateral ankle and so I did not create an additional subtalar outflow portal.  The articular surface of the subtalar joint was again somewhat pockmarked but otherwise relatively intact.  The joint was flushed with additional copious amounts of normal saline.  A culture swab was sent from the subtalar joint as well up.  There is also significant thickened fluid in the subtalar joint.  A Penrose drain was placed in the anterolateral portal.  The wounds were loosely tacked with 3-0 nylon.  A sterile dressing was applied.    Postoperative plan:  He may weight-bear as tolerated.  We will continue IV antibiotics per infectious disease.  I would like to see him back in clinic in 1 week's time for wound check.  We will continue to monitor.  Mary Gonzalez MD    Date: 7/5/2023  Time: 8:44 AM      * No implants in log *

## 2023-07-05 NOTE — TREATMENT PLAN
Orthopedic Update:   Left ankle aspiration completed. +monosodium urate crystals, cell count 50,340k, gram stain with +GPC, cultures pending. Given presentation with Group A strep bacteremia, clinical picture consistent with left ankle chronic tophaceous gout with superimposed septic arthritis. He is scheduled to undergo right thumb I&D with Dr. Park in the AM. Discussed patient's case with Dr. Gonzalez, who is available to perform Left foot and ankle irrigation and debridement at that time. Case request placed. NPO at [place]. Hold anticoagulation.     Vladimir Jones MD   Orthopedic Surgery   [facility]

## 2023-07-06 ENCOUNTER — HOME INFUSION (PRE-WILLOW HOME INFUSION) (OUTPATIENT)
Dept: PHARMACY | Facility: CLINIC | Age: 43
End: 2023-07-06
Payer: COMMERCIAL

## 2023-07-06 ENCOUNTER — APPOINTMENT (OUTPATIENT)
Dept: PHYSICAL THERAPY | Facility: HOSPITAL | Age: 43
End: 2023-07-06
Attending: PHYSICIAN ASSISTANT
Payer: COMMERCIAL

## 2023-07-06 ENCOUNTER — APPOINTMENT (OUTPATIENT)
Dept: OCCUPATIONAL THERAPY | Facility: HOSPITAL | Age: 43
End: 2023-07-06
Attending: INTERNAL MEDICINE
Payer: COMMERCIAL

## 2023-07-06 LAB
CREAT SERPL-MCNC: 1.12 MG/DL (ref 0.67–1.17)
GFR SERPL CREATININE-BSD FRML MDRD: 85 ML/MIN/1.73M2
GLUCOSE BLDC GLUCOMTR-MCNC: 131 MG/DL (ref 70–99)
GLUCOSE BLDC GLUCOMTR-MCNC: 148 MG/DL (ref 70–99)
GLUCOSE BLDC GLUCOMTR-MCNC: 78 MG/DL (ref 70–99)
GLUCOSE BLDC GLUCOMTR-MCNC: 82 MG/DL (ref 70–99)
GLUCOSE BLDC GLUCOMTR-MCNC: 90 MG/DL (ref 70–99)
HGB BLD-MCNC: 9.5 G/DL (ref 13.3–17.7)
HOLD SPECIMEN: NORMAL

## 2023-07-06 PROCEDURE — 36415 COLL VENOUS BLD VENIPUNCTURE: CPT | Performed by: HOSPITALIST

## 2023-07-06 PROCEDURE — 99232 SBSQ HOSP IP/OBS MODERATE 35: CPT | Performed by: INTERNAL MEDICINE

## 2023-07-06 PROCEDURE — 36415 COLL VENOUS BLD VENIPUNCTURE: CPT | Performed by: INTERNAL MEDICINE

## 2023-07-06 PROCEDURE — 99222 1ST HOSP IP/OBS MODERATE 55: CPT | Performed by: PODIATRIST

## 2023-07-06 PROCEDURE — 258N000003 HC RX IP 258 OP 636

## 2023-07-06 PROCEDURE — 82565 ASSAY OF CREATININE: CPT | Performed by: HOSPITALIST

## 2023-07-06 PROCEDURE — 85018 HEMOGLOBIN: CPT | Performed by: PHYSICIAN ASSISTANT

## 2023-07-06 PROCEDURE — 97530 THERAPEUTIC ACTIVITIES: CPT | Mod: GP

## 2023-07-06 PROCEDURE — 250N000011 HC RX IP 250 OP 636: Mod: JZ

## 2023-07-06 PROCEDURE — 87040 BLOOD CULTURE FOR BACTERIA: CPT | Performed by: INTERNAL MEDICINE

## 2023-07-06 PROCEDURE — 250N000011 HC RX IP 250 OP 636: Mod: JZ | Performed by: PHYSICIAN ASSISTANT

## 2023-07-06 PROCEDURE — 97110 THERAPEUTIC EXERCISES: CPT | Mod: GO

## 2023-07-06 PROCEDURE — 250N000013 HC RX MED GY IP 250 OP 250 PS 637: Performed by: PHYSICIAN ASSISTANT

## 2023-07-06 PROCEDURE — 97166 OT EVAL MOD COMPLEX 45 MIN: CPT | Mod: GO

## 2023-07-06 PROCEDURE — 258N000003 HC RX IP 258 OP 636: Performed by: PHYSICIAN ASSISTANT

## 2023-07-06 PROCEDURE — G0463 HOSPITAL OUTPT CLINIC VISIT: HCPCS

## 2023-07-06 PROCEDURE — 250N000011 HC RX IP 250 OP 636: Mod: JZ | Performed by: INTERNAL MEDICINE

## 2023-07-06 PROCEDURE — 99233 SBSQ HOSP IP/OBS HIGH 50: CPT | Performed by: INTERNAL MEDICINE

## 2023-07-06 PROCEDURE — 120N000001 HC R&B MED SURG/OB

## 2023-07-06 PROCEDURE — 250N000013 HC RX MED GY IP 250 OP 250 PS 637: Performed by: INTERNAL MEDICINE

## 2023-07-06 PROCEDURE — 97161 PT EVAL LOW COMPLEX 20 MIN: CPT | Mod: GP

## 2023-07-06 PROCEDURE — 999N000248 HC STATISTIC IV INSERT WITH US BY RN

## 2023-07-06 RX ORDER — INDOMETHACIN 25 MG/1
50 CAPSULE ORAL 2 TIMES DAILY WITH MEALS
Status: DISCONTINUED | OUTPATIENT
Start: 2023-07-06 | End: 2023-07-10 | Stop reason: HOSPADM

## 2023-07-06 RX ADMIN — HYDROMORPHONE HYDROCHLORIDE 0.4 MG: 0.2 INJECTION, SOLUTION INTRAMUSCULAR; INTRAVENOUS; SUBCUTANEOUS at 20:40

## 2023-07-06 RX ADMIN — COLCHICINE 0.6 MG: 0.6 TABLET ORAL at 08:26

## 2023-07-06 RX ADMIN — DEXTROSE MONOHYDRATE 900 MG: 50 INJECTION, SOLUTION INTRAVENOUS at 05:00

## 2023-07-06 RX ADMIN — OXYCODONE HYDROCHLORIDE 10 MG: 5 TABLET ORAL at 12:41

## 2023-07-06 RX ADMIN — COLCHICINE 0.6 MG: 0.6 TABLET ORAL at 20:45

## 2023-07-06 RX ADMIN — OXYCODONE HYDROCHLORIDE 10 MG: 5 TABLET ORAL at 22:22

## 2023-07-06 RX ADMIN — SODIUM CHLORIDE, POTASSIUM CHLORIDE, SODIUM LACTATE AND CALCIUM CHLORIDE: 600; 310; 30; 20 INJECTION, SOLUTION INTRAVENOUS at 05:48

## 2023-07-06 RX ADMIN — HYDROMORPHONE HYDROCHLORIDE 0.4 MG: 0.2 INJECTION, SOLUTION INTRAMUSCULAR; INTRAVENOUS; SUBCUTANEOUS at 04:49

## 2023-07-06 RX ADMIN — Medication 81 MG: at 20:45

## 2023-07-06 RX ADMIN — HYDROMORPHONE HYDROCHLORIDE 0.4 MG: 0.2 INJECTION, SOLUTION INTRAMUSCULAR; INTRAVENOUS; SUBCUTANEOUS at 08:26

## 2023-07-06 RX ADMIN — Medication 81 MG: at 08:26

## 2023-07-06 RX ADMIN — DEXTROSE MONOHYDRATE 900 MG: 50 INJECTION, SOLUTION INTRAVENOUS at 12:36

## 2023-07-06 RX ADMIN — SENNOSIDES AND DOCUSATE SODIUM 1 TABLET: 50; 8.6 TABLET ORAL at 08:26

## 2023-07-06 RX ADMIN — HYDROMORPHONE HYDROCHLORIDE 0.4 MG: 0.2 INJECTION, SOLUTION INTRAMUSCULAR; INTRAVENOUS; SUBCUTANEOUS at 10:53

## 2023-07-06 RX ADMIN — DEXTROSE MONOHYDRATE 900 MG: 50 INJECTION, SOLUTION INTRAVENOUS at 20:31

## 2023-07-06 RX ADMIN — INDOMETHACIN 50 MG: 25 CAPSULE ORAL at 15:54

## 2023-07-06 RX ADMIN — ACETAMINOPHEN 975 MG: 325 TABLET ORAL at 05:36

## 2023-07-06 RX ADMIN — CEFTRIAXONE SODIUM 2 G: 2 INJECTION, POWDER, FOR SOLUTION INTRAMUSCULAR; INTRAVENOUS at 22:23

## 2023-07-06 RX ADMIN — HYDROXYZINE HYDROCHLORIDE 25 MG: 25 TABLET, FILM COATED ORAL at 11:12

## 2023-07-06 RX ADMIN — PANTOPRAZOLE SODIUM 40 MG: 40 TABLET, DELAYED RELEASE ORAL at 06:30

## 2023-07-06 RX ADMIN — FEBUXOSTAT 40 MG: 40 TABLET, FILM COATED ORAL at 08:26

## 2023-07-06 RX ADMIN — DEXTROSE MONOHYDRATE 900 MG: 50 INJECTION, SOLUTION INTRAVENOUS at 05:35

## 2023-07-06 RX ADMIN — HYDROMORPHONE HYDROCHLORIDE 0.4 MG: 0.2 INJECTION, SOLUTION INTRAMUSCULAR; INTRAVENOUS; SUBCUTANEOUS at 18:16

## 2023-07-06 RX ADMIN — INDOMETHACIN 50 MG: 25 CAPSULE ORAL at 11:09

## 2023-07-06 RX ADMIN — HYDROMORPHONE HYDROCHLORIDE 0.4 MG: 0.2 INJECTION, SOLUTION INTRAMUSCULAR; INTRAVENOUS; SUBCUTANEOUS at 15:52

## 2023-07-06 ASSESSMENT — ACTIVITIES OF DAILY LIVING (ADL)
ADLS_ACUITY_SCORE: 24
ADLS_ACUITY_SCORE: 24
ADLS_ACUITY_SCORE: 26
ADLS_ACUITY_SCORE: 24

## 2023-07-06 NOTE — PROGRESS NOTES
"Orthopedic Progress Note      Assessment: 1 Day Post-Op  S/P Procedure(s):  IRRIGATION AND DEBRIDEMENT, RIGHT THUMB, RIGHT SMALL FINGER     Plan:   - Continue PT/OT  - Weightbearing status: WBAT LLE  - Activity: Up with assist and assistive device until independent.  - Anticoagulation: ASA 81 PO BID in addition to SCDs, braden stockings and early ambulation.  - Antibiotics: Per ID.  Appreciate recs  - Pain Management; transition from IV to PO as tolerated  - Diet: progress diet as tolerated  - Labs: hgb 9.5, transfuse if <7.0. No indication today  - Dressing: Keep dry and intact  - Right hand warm soapy water soaks three times daily.  - Elevation: Elevate LLE on pillow to keep above the level of the heart as much as possible  - Follow-up: Outpatient follow up in 2 weeks  - Disposition: Anticipate discharge to TCU.  Will continue to monitor closely for need for repeat I/D.  No need at this time.      Subjective:  Pain: severe  Nausea, Vomiting:  No  Lightheadedness, Dizziness:  No  Neuro:  Patient denies new onset numbness or paresthesias  Fever, chills: No  Chest pain: No  SOB: No    Patient reports feeling well today. Patient reports pain controlled with IV dilaudid, only temporary control.  Not controlled on oral meds.  Severe pain when doing PT. Patient eating and drinking well. Patient voiding and passing gas and BM. All questions/concerns answered.      Objective:  BP (!) 169/97 (BP Location: Right arm)   Pulse 96   Temp 98.9  F (37.2  C) (Oral)   Resp 18   Ht 1.626 m (5' 4\")   Wt 81.6 kg (180 lb)   SpO2 94%   BMI 30.90 kg/m      The patient is A&Ox3. Appears comfortable, lying in bed  Calves without tenderness, neg Krista's  Brisk capillary refill in the toes.   Palpable Left dorsalis pedis pulse. Left foot warm & well-perfused.  Sensation is intact to light touch & equal bilaterally in the femoral, DP, SP & tibial nerve distributions.  ROM: Appropriately flexes & extends all toes bilaterally.   Motor: No " active DF/PF  Leg lengths equal.  Foot dressing removed and replaced during today's visit. Drain in place.    Right hand incision C/D/I  Wiggles fingers appropriately  Neurovascularly intact    Pertinent Labs   Lab Results: personally reviewed.   No results found for: INR, PROTIME  Lab Results   Component Value Date    WBC 11.6 (H) 07/05/2023    HGB 9.5 (L) 07/06/2023    HCT 31.8 (L) 07/05/2023    MCV 68 (L) 07/05/2023     07/05/2023     Lab Results   Component Value Date     (L) 07/05/2023    CO2 17 (L) 07/05/2023         Report completed by:  JOHNNY SRIVASTAVA PA-C  Date: 7/6/2023  Time: 2:07 PM  [facility]

## 2023-07-06 NOTE — PLAN OF CARE
Problem: Plan of Care - These are the overarching goals to be used throughout the patient stay.    Goal: Plan of Care Review  Description: The Plan of Care Review/Shift note should be completed every shift.  The Outcome Evaluation is a brief statement about your assessment that the patient is improving, declining, or no change.  This information will be displayed automatically on your shift note.  Outcome: Progressing  Flowsheets (Taken 7/6/2023 [id number])  Plan of Care Reviewed With: patient   Goal Outcome Evaluation:      Plan of Care Reviewed With: patient      Pt alert and oriented x 4, able to let his need known. Bilateral upper and lower extremity pain from Gout flare up control with PRN Dilaudid and schedule meds see MAR. Right hand soaked with warm water and soap three times a day and elevated on pillow.

## 2023-07-06 NOTE — CONSULTS
[facility]  WO Nurse Inpatient Assessment     Consulted for: Extremities    Patient History (according to provider note(s):        Assessment:    Hospitalist consulted [facility] for extremities. Surgeons completed intervention for R hand and L ankle. Please contact respective surgeon for specific questions or orders for their respective surgical sites.    Open areas of BUE and BLE with nodules which remain intact and areas of dried drainage. These should be left open to air at this time.    Treatment Plan:     See above    RECOMMEND PRIMARY TEAM ORDER: None, at this time  Education provided: None needed  Discussed plan of care with: Patient, Family and Nurse  WO nurse follow-up plan: signing off  Notify WO if wound(s) deteriorate.  Nursing to notify the Provider(s) and re-consult the WO Nurse if new skin concern.    DATA:     Current support surface: Standard  Standard gel/foam mattress (IsoFlex, Atmos air, etc)  Containment of urine/stool: Continent of bladder and Continent of bowel  BMI: Body mass index is 30.9 kg/m .   Active diet order: Orders Placed This Encounter      Advance Diet as Tolerated: Regular Diet Adult      Discharge Instruction - Regular Diet Adult     Output: I/O last 3 completed shifts:  In: 860 [P.O.:360; I.V.:500]  Out: 601 [Urine:600; Blood:1]     Labs: Recent Labs   Lab 07/06/23  0605 07/05/23  1804   ALBUMIN  --  2.6*   HGB 9.5* 9.8*   WBC  --  11.6*     Pressure injury risk assessment:   Sensory Perception: 3-->slightly limited  Moisture: 3-->occasionally moist  Activity: 3-->walks occasionally  Mobility: 3-->slightly limited  Nutrition: 3-->adequate  Friction and Shear: 3-->no apparent problem  Marshal Score: 18    Kandace Lynch, MSN RN CWOCN  Pager no longer is use, please contact through [facility] group: [facility]

## 2023-07-06 NOTE — PROGRESS NOTES
[facility], however there may be a copay upon dispense.     ([facility]) In reference to admission date 07/03/2023.    Please contact Intake with any questions, [phone] or In Basket pool, [facility] ([phone]).

## 2023-07-06 NOTE — PROGRESS NOTES
07/06/23 0840   Appointment Info   Signing Clinician's Name / Credentials (PT) Kimi Hernandez, PT, DPT   Living Environment   People in Home spouse;child(jasmina), dependent   Current Living Arrangements house   Home Accessibility stairs to enter home;stairs within home   Number of Stairs, Main Entrance 2   Stair Railings, Main Entrance railings safe and in good condition;railing on left side (ascending)   Number of Stairs, Within Home, Primary one   Stair Railings, Within Home, Primary none   Transportation Anticipated family or friend will provide   Self-Care   Usual Activity Tolerance good   Current Activity Tolerance moderate   Equipment Currently Used at Home none   Fall history within last six months no   Activity/Exercise/Self-Care Comment Patient reports previous independence with mobility and ADLs. Does outdoor chores around the house, wife does cooking, cleaning, and laundry.   General Information   Onset of Illness/Injury or Date of Surgery 07/03/23   Referring Physician Stephania Treviño MD   Patient/Family Therapy Goals Statement (PT) To go home   Pertinent History of Current Problem (include personal factors and/or comorbidities that impact the POC) Tyler Miller is a 42 year old male with a pertinent history of chronic gouty arthritis, bone erosion, HTN who presents to this ED via [facility] for evaluation of joint pain and swelling. Status post L ankle/foot I&D 7/5/23   Existing Precautions/Restrictions fall;weight bearing   Weight-Bearing Status - LLE weight-bearing as tolerated   Weight-Bearing Status - RLE full weight-bearing   Cognition   Affect/Mental Status (Cognition) WNL   Orientation Status (Cognition) oriented x 3   Follows Commands (Cognition) WNL   Range of Motion (ROM)   Range of Motion ROM is WFL   Strength (Manual Muscle Testing)   Strength (Manual Muscle Testing) strength is WFL   Bed Mobility   Bed Mobility no deficits identified   Comment, (Bed Mobility) Independent   Transfers    Transfers sit-stand transfer   Maintains Weight-bearing Status (Transfers) other (see comments)  (Patient declines to bear weight through LLE due to pain, able to perform sit to stand transfer with LLE off ground)   Sit-Stand Transfer   Sit-Stand [place] (Transfers) supervision;1 person to manage equipment   Assistive Device (Sit-Stand Transfers) walker, front-wheeled   Gait/Stairs (Locomotion)   [place] Level (Gait) contact guard;1 person to manage equipment   Assistive Device (Gait) walker, front-wheeled   Distance in Feet 30   Pattern (Gait) 3-point  (Patient self selected 3 point gait due to increased pain in LLE with weight bearing)   Deviations/Abnormal Patterns (Gait) aileen decreased;gait speed decreased;weight shifting decreased   Clinical Impression   Criteria for Skilled Therapeutic Intervention Yes, treatment indicated   PT Diagnosis (PT) Impaired functional mobility   Influenced by the following impairments Pain   Functional limitations due to impairments Transfers, gait, stairs   Clinical Presentation (PT Evaluation Complexity) Evolving/Changing   Clinical Presentation Rationale Patient presents as medically diagnosed   Clinical Decision Making (Complexity) low complexity   Planned Therapy Interventions (PT) gait training;home exercise program;patient/family education;neuromuscular re-education;stair training;strengthening;transfer training   Anticipated Equipment Needs at Discharge (PT) walker, rolling   Risk & Benefits of therapy have been explained evaluation/treatment results reviewed;care plan/treatment goals reviewed;participants voiced agreement with care plan;participants included;patient;spouse/significant other   PT Total Evaluation Time   PT Eval, Low Complexity Minutes (17218) 14   Physical Therapy Goals   PT Frequency Daily   PT Predicted Duration/Target Date for Goal Attainment 07/13/23   PT Goals Transfers;Gait   PT: Transfers Modified independent;Sit to/from stand;Bed  to/from chair;Assistive device;Within precautions   PT: Gait Modified independent;Assistive device;50 feet   Interventions   Interventions Quick Adds Therapeutic Activity   Therapeutic Activity   Therapeutic Activities: dynamic activities to improve functional performance Minutes (76651) 8   Symptoms Noted During/After Treatment Increased pain   Treatment Detail/Skilled Intervention Patient supine in bed on arrival, agreeable to therapy. Educated patient on weight bearing status. Assistance with linen and lines during bed mobility. Verbal cueing for hand placement for sit to stand transfer. Assistance with lines as well. Patient performed 1 additional sit to stand transfer with SBA x 1 and FWW, assistance with lines. Verbal cueing for hand placement. Patient declined further therapy interventions including gait and LE ex due to increased LLE pain. Sit to supine independently. Patient supine in bed with call light within reach at end of session.   PT Discharge Planning   PT Plan Transfers, gait-increase distance, LE ex, and stairs as tolerated   PT Discharge Recommendation (DC Rec) home;home with assist   PT Rationale for DC Rec Patient currently requires SBA for transfers, CGA for gait and is independent with bed mobility. He lives in a house with his wife and children whom are able to provide assistance as needed. He has 2 stairs to enter his home and 1 stair inside of his home; at this time he has not attempted stairs. He does not have access to a walker at home and does not use one at baseline. Anticipate patient will be safe to discharge home with assist from family and use of a walker for safe mobility once medically cleared to do so.   PT Brief overview of current status Assist of 1 with transfers and gait   Total Session Time   Timed Code Treatment Minutes 8   Total Session Time (sum of timed and untimed services) 22     Kimi Hernandez, PT, DPT

## 2023-07-06 NOTE — PROGRESS NOTES
Infectious Disease Progress Note  07/06/2023    Assessment/Plan  Impression:  1. Bacteremia-- Streptococcus pyogenes (Group A Streptococcus)   2. Severe tophaceous gout involving multiple joints: Hand, foot, see photos below  3. Septic arthritis- Secondary bacterial infection with gram-positive cocci--Blood and wound culture are growing Group A strep.   4. Severe leukocytosis, white count 33 K on admission  5. IRRIGATION AND DEBRIDEMENT arthroscopic INCISION AND DRAINAGE, FOOT AND ANKLE including left ankle arthroscopy and subtalar arthroscopy and debridement on 7/5/2023    6. Worsening pain, drainage over the last 3 days  7. Also having a lot of L ankle pain.  Synovial fluid aspirated from left ankle, cultures in process, S/P surgery  8. Pain control is an active issue  9. History of hypertension and anemia.  Patient is on colchicine and Uloric prior to admission for gout     Plan discussed with patient with the help of [employer]  on the phone  Discussed with orthopedic in person  Patient and patient's family updated       Recommendations     1. Continue ceftriaxone for at least 6 weeks  2. Discontinued vancomycin.  Added clindamycin.  We will stop clindamycin on discharge  3. Once blood cultures from 7/5/2023 are negative for 72 hours  4. Follow blood cultures, CBC, CMP  5. Orthopedic consulted and S/P I&D-appreciate input from orthopedic surgery  6. Patient remain hospitalized at least for 3 to 4 days depending on blood culture results, clinical response  7. Multiple skin lesions, would need dermatology as an outpatient  8. Discussed with patient with the help of Urban Gentleman        Principal Problem:    Acute idiopathic gout of right hand  Active Problems:    Chronic tophaceous gout    Cellulitis of finger of right hand    Acute idiopathic gout of left ankle      Kristyn Black MD  [facility]  Answering Service: [phone]  On-Call ID provider: [phone], option:  9      Subjective  Pain still present  Still with pain in L ankle.  Otherwise improving.   Overall better, continue antibiotics      Objective    Vital signs in last 24 hours  Temp:  [98.2  F (36.8  C)-99.7  F (37.6  C)] 98.9  F (37.2  C)  Pulse:  [] 96  Resp:  [18-20] 18  BP: (105-169)/(69-97) 169/97  SpO2:  [93 %-96 %] 94 %  Wt Readings from Last 3 Encounters:   07/03/23 81.6 kg (180 lb)   03/29/21 91.2 kg (201 lb 1.3 oz)           Intake/Output last 3 shifts  I/O last 3 completed shifts:  In: 860 [P.O.:360; I.V.:500]  Out: 601 [Urine:600; Blood:1]  Intake/Output this shift:  No intake/output data recorded.    Review of Systems   Pertinent items are noted in HPI., otherwise negative.    Physical Exam  GENERAL APPEARANCE:  awake  EYES: Eyes grossly normal to inspection, conjunctivae and sclerae normal  HENT: mouth without ulcers or lesions  NECK: supple  RESP: normal breathing pattern, clear to auscultation  CV: regular rates and rhythm, S1 S2  LYMPHATICS: Notable toes finger swelling  ABDOMEN: soft, nontender, nondistended  MS: extremities normal-patient is gout gross deformities noted-see above--still significant pain and swelling in Left ankle.   SKIN: Lesions, open wounds on right thumb, fingers, left middle finger edematous, multiple toes with gout and ulceration, tophaceous gout of multiple extremities  Papular brown discolored lesions on thighs, per patient has been there for several months  From patient's chart                    Pertinent Labs   Lab Results: personally reviewed.     Recent Labs   Lab 07/06/23  0605 07/05/23 1804 07/04/23 0623 07/03/23  0736   WBC  --  11.6* 12.7* 33.1*   HGB 9.5* 9.8* 10.2* 11.0*   HCT  --  31.8* 33.4* 37.4*   PLT  --  255 258 386        Recent Labs   Lab 07/05/23 1804 07/04/23 0623 07/03/23  0736   * 132* 133*   CO2 17* 19* 19*   BUN 15.0 16.6 17.4     No results found for: CULT  7-Day Micro Results     Collected Updated Procedure Result Status       07/05/2023 1804 07/06/2023 0917 Blood Culture Arm, Left [[id number]]   Blood from Arm, Left    Preliminary result Component Value   Culture No growth after 12 hours  [P]                07/05/2023 1804 07/06/2023 0917 Blood Culture Arm, Left [[id number]]    Blood from Arm, Left    Preliminary result Component Value   Culture No growth after 12 hours  [P]                07/05/2023 0823 07/05/2023 0844 Anaerobic Bacterial Culture Routine [[id number]]   Wound from Foot, Left    In process Component Value   No component results               07/05/2023 0823 07/05/2023 1722 Gram Stain [[id number]]   (Abnormal)   Wound from Foot, Left    Final result Component Value   Gram Stain Result 3+ Gram positive cocci   Corrected on July 5, 2023/5:20 PM by Merry Leal  Previously reported as: No organisms seen  Gram Stain slide reviewed at the [facility].    Gram Stain Result 3+ WBC seen   Predominantly PMNs            07/05/2023 0823 07/05/2023 0844 Fungal or Yeast Culture Routine [[id number]]   Wound from Foot, Left    In process Component Value   No component results               07/05/2023 0823 07/06/2023 1123 Wound Aerobic Bacterial Culture Routine [[id number]]   Wound from Foot, Left    Preliminary result Component Value   Culture Culture in progress  [P]                07/05/2023 0809 07/06/2023 1232 Anaerobic Bacterial Culture Routine [[id number]]   Wound from Foot, Left    Preliminary result Component Value   Culture No anaerobic organisms isolated after 1 day  [P]                07/05/2023 0809 07/05/2023 1408 Gram Stain [[id number]]   (Abnormal)   Wound from Foot, Left    Final result Component Value   Gram Stain Result 3+ Gram positive cocci   Gram Stain Result 3+ WBC seen   Predominantly PMNs            07/05/2023 0809 07/06/2023 1007 Wound Aerobic Bacterial Culture Routine [[id number]]   (Abnormal)   Wound from Foot, Left    Preliminary result Component Value    Culture Culture in progress  [P]     2+ Streptococcus pyogenes (Group A Streptococcus)  [P]     This organism is susceptible to ampicillin, penicillin, vancomycin and the cephalosporins. If treatment is required and your patient is allergic to penicillin, contact the microbiology lab within 5 days to request susceptibility testing.               07/05/2023 0800 07/06/2023 1247 Anaerobic Bacterial Culture Routine [[id number]]   Wound from Hand, Right    Preliminary result Component Value   Culture No anaerobic organisms isolated after 1 day  [P]                07/05/2023 0800 07/05/2023 1341 Gram Stain [[id number]]   Wound from Hand, Right    Final result Component Value   Gram Stain Result No organisms seen   Gram Stain Result 2+ WBC seen            07/05/2023 0800 07/06/2023 0749 Wound Aerobic Bacterial Culture Routine [[id number]]   Wound from Hand, Right    Preliminary result Component Value   Culture No growth, less than 1 day  [P]                07/04/2023 1413 07/04/2023 1535 Joint Fluid Exam [[id number]]    (Abnormal)   Synovial fluid from Ankle, Left    Final result Component Value   No component results            07/04/2023 1413 07/04/2023 1901 Cell count with differential fluid [[id number]]    (Abnormal)   Synovial fluid from Ankle, Left    Final result Component Value   No component results            07/04/2023 1413 07/05/2023 2132 Anaerobic Bacterial Culture Routine [[id number]]   Synovial fluid from Ankle, Left    Preliminary result Component Value   Culture No anaerobic organisms isolated after 1 day  [P]                07/04/2023 1413 07/06/2023 0939 Synovial fluid Aerobic Bacterial Culture Routine [[id number]]   (Abnormal)   Synovial fluid from Ankle, Left    Preliminary result Component Value   Culture 4+ Streptococcus pyogenes (Group A Streptococcus)  [P]                07/04/2023 1413 07/04/2023 2209 Gram Stain [[id number]]   (Abnormal)   Synovial fluid from Ankle, Left     Final result Component Value   Gram Stain Result 3+ Gram positive cocci   Gram Stain Result 3+ WBC seen   Predominantly PMNs            07/04/2023 1413 07/04/2023 1535 Crystal ID Synovial Fluid [[id number]]   (Abnormal)   Synovial fluid from Ankle, Left    Final result Component Value   Crystals Analysis Positive for extracellular crystals, consistent with monosodium urate crystals.            07/04/2023 1413 07/04/2023 1624 Cell Count Body Fluid [[id number]]    (Abnormal)   Synovial fluid from Ankle, Left    Final result Component Value Units   Color Red    Clarity Hazy    Cell Count Fluid Source Ankle, Left    Total Nucleated Cells 50,340 /uL            07/04/2023 1413 07/04/2023 1901 Differential Body Fluid [[id number]]    Synovial fluid from Ankle, Left    Final result Component Value   % Neutrophils --   % Lymphocytes --   % Monocyte/Macrophages --            07/03/2023 0318 07/03/2023 1032 MRSA MSSA PCR, Nasal Swab [[id number]]    Swab from Nose    Final result Component Value   MRSA Target DNA Negative   SA Target DNA Positive            07/03/2023 0302 07/05/2023 0723 Blood Culture Peripheral Blood [[id number]]   (Abnormal)   Peripheral Blood    Final result Component Value   Culture Positive on the 1st day of incubation    Streptococcus pyogenes (Group A Streptococcus)    2 of 2 bottles    Susceptibilities done on previous cultures               07/03/2023 0242 07/05/2023 1041 Blood Culture Peripheral Blood [[id number]]    (Abnormal)   Peripheral Blood    Final result Component Value   Culture Positive on the 1st day of incubation    Streptococcus pyogenes (Group A Streptococcus)    2 of 2 bottles        Susceptibility      Streptococcus pyogenes (Group A Streptococcus)      DOUGLAS      Ampicillin <=0.06 ug/mL Susceptible      Cefotaxime <=0.25 ug/mL Susceptible      Ceftriaxone <=0.25 ug/mL Susceptible      Clindamycin <=0.06 ug/mL Susceptible      Meropenem <=0.06 ug/mL Susceptible       "Penicillin <=0.03 ug/mL Susceptible      Vancomycin 0.25 ug/mL Susceptible                           07/03/2023 0242 07/03/2023 1857 Verigene GP Panel [[id number]]    (Abnormal)   Peripheral Blood    Final result Component Value   Staphylococcus species Not Detected   Staphylococcus aureus Not Detected   Staphylococcus epidermidis Not Detected   Staphylococcus lugdunensis Not Detected   Enterococcus faecalis Not Detected   Enterococcus faecium Not Detected   Streptococcus agalactiae Not Detected   Streptococcus anginosus group Not Detected   Streptococcus pneumoniae Not Detected   Streptococcus pyogenes Detected   Positive for Streptococcus pyogenes (Group A Streptococcus) by [employer] multiplex nucleic acid test. Penicillin and ampicillin are drugs of choice; non-susceptible isolates have not been reported. Final identification and antimicrobial susceptibility testing will be verified by standard methods.   Listeria species Not Detected            07/03/2023 0142 07/05/2023 0745 Abscess Aerobic Bacterial Culture Routine with Gram Stain [[id number]]   (Abnormal)   Abscess from Thumb, Right    Final result Component Value   Culture 2+ Streptococcus pyogenes (Group A Streptococcus)    This organism is susceptible to ampicillin, penicillin, vancomycin and the cephalosporins. If treatment is required and your patient is allergic to penicillin, contact the microbiology lab within 5 days to request susceptibility testing.   Gram Stain Result 1+ Gram positive cocci    2+ WBC seen                     Pertinent Radiology   Radiology Results:     POC US Guidance Needle Placement    Result Date: 7/5/2023  Ultrasound was performed as guidance to an anesthesia procedure.  Click \"PACS images\" hyperlink below to view any stored images.  For specific procedure details, view procedure note authored by anesthesia.    US Joint Injection Aspiration Major Left    Result Date: 7/4/2023  EXAM: 1. PUNCTURE AND ASPIRATION LEFT ANKLE " JOINT 2. ULTRASOUND GUIDANCE LOCATION: [facility] DATE: 7/4/2023 INDICATION: left ankle  known gout, also Strep bacteremia, evaluate for septic arthritis. PROCEDURE: Informed consent obtained. Time out performed. The site was prepped and draped in sterile fashion. 10 mL of 1% lidocaine was infused into the local soft tissues. Under direct ultrasound guidance, a 22 gauge needle was placed into the fluid pocket and 5 ml of brown, cloudy fluid was aspirated. This was sent for cultures.     IMPRESSION: 1.  Status post ultrasound-guided puncture and aspiration of the left ankle joint.     MR Foot Left w/o & w Contrast    Result Date: 7/4/2023  EXAM: MR FOOT LEFT W/O and W CONTRAST, MR ANKLE LEFT W/O and W CONTRAST LOCATION: [facility] DATE: 7/4/2023 INDICATION: left ankle pain, erosion of 2nd MT and phalanx, RO septic arthritis, gout, vs osteo COMPARISON: None. TECHNIQUE: Routine. Additional postgadolinium T1 sequences were obtained. IV CONTRAST: 8ml Gadavist FINDINGS: JOINTS AND BONES: -Extensive erosive osseous changes are seen in the left foot and ankle most pronounced in the first MTP joint as well as the second metatarsal head with additional erosive foci are seen in the midfoot, second mid and distal phalanx, with large proliferative erosive changes seen in the posterior subtalar joints and posterior aspect of the talar. These proliferative erosive soft tissue masses are relatively hypointense on T1 and isointense on T2-weighted heterogeneous enhancement on postcontrast  imaging. The bone marrow adjacent to the erosive masses is not significantly edematous or enhancing. TENDONS: -There is mild thickening and increased T2 signal of the distal aspect of the Achilles tendon (image 12, series 12). LIGAMENTS: -Lisfranc ligament: Intact. No subluxation. MUSCLES AND SOFT TISSUES: -Extensive large soft tissue masses is are seen in throughout the forefoot and hindfoot, which  are low on T1 signal and intermediate T2 signal with variable postcontrast enhancement.     IMPRESSION: 1.  Multifocal extensive large soft tissue masses with underlying osseous erosion seen in the forefoot and hindfoot, most pronounced at the first MTP joint and second metatarsal head as well as in the posterior aspect of the ankle joint, favored reflect large gouty tophi given the patient's history and enhancement characteristics. 2.  No evidence of drainable soft tissue abscess or definitive evidence of osteomyelitis is seen. 3.  Mild Achilles tendinosis    MR Ankle Left w/o & w Contrast    Result Date: 7/4/2023  EXAM: MR FOOT LEFT W/O and W CONTRAST, MR ANKLE LEFT W/O and W CONTRAST LOCATION: [facility] DATE: 7/4/2023 INDICATION: left ankle pain, erosion of 2nd MT and phalanx, RO septic arthritis, gout, vs osteo COMPARISON: None. TECHNIQUE: Routine. Additional postgadolinium T1 sequences were obtained. IV CONTRAST: 8ml Gadavist FINDINGS: JOINTS AND BONES: -Extensive erosive osseous changes are seen in the left foot and ankle most pronounced in the first MTP joint as well as the second metatarsal head with additional erosive foci are seen in the midfoot, second mid and distal phalanx, with large proliferative erosive changes seen in the posterior subtalar joints and posterior aspect of the talar. These proliferative erosive soft tissue masses are relatively hypointense on T1 and isointense on T2-weighted heterogeneous enhancement on postcontrast  imaging. The bone marrow adjacent to the erosive masses is not significantly edematous or enhancing. TENDONS: -There is mild thickening and increased T2 signal of the distal aspect of the Achilles tendon (image 12, series 12). LIGAMENTS: -Lisfranc ligament: Intact. No subluxation. MUSCLES AND SOFT TISSUES: -Extensive large soft tissue masses is are seen in throughout the forefoot and hindfoot, which are low on T1 signal and intermediate T2 signal  with variable postcontrast enhancement.     IMPRESSION: 1.  Multifocal extensive large soft tissue masses with underlying osseous erosion seen in the forefoot and hindfoot, most pronounced at the first MTP joint and second metatarsal head as well as in the posterior aspect of the ankle joint, favored reflect large gouty tophi given the patient's history and enhancement characteristics. 2.  No evidence of drainable soft tissue abscess or definitive evidence of osteomyelitis is seen. 3.  Mild Achilles tendinosis    XR Foot 3 Views Standing Bilateral    Result Date: 7/3/2023  EXAM: XR FOOT 3 VIEWS STANDING BILATERAL LOCATION: [facility] DATE: 7/3/2023 INDICATION: Chronic bilateral foot wounds. Evaluate osteomyelitis or occult pathology. History of gout. COMPARISON: None.     IMPRESSION: Marked erosive/destructive changes involving the right second MTP joint with extensive destruction of the second metatarsal head and neck as well as the majority of the proximal middle phalanges most likely secondary to gout with marked surrounding soft tissue swelling. Mild periarticular erosive changes involving the right third and fourth TMT and third MTP joint. In the left foot, there are marked periarticular erosive changes involving the MTP joint of the great toe with marked surrounding soft tissue swelling most consistent with gout. Mild periarticular erosive changes involving the second MTP and third TMT joints.     XR Finger Right G/E 2 Views    Result Date: 7/3/2023  EXAM: XR FINGER RIGHT G/E 2 VIEWS LOCATION: [facility] DATE: 7/3/2023 INDICATION: right thumb pain, h o gout COMPARISON: 03/29/2021     IMPRESSION: Marked soft tissue swelling of the proximal soft tissues of the thumb with periarticular osteopenia which likely reflect the sequela of patient's known gout. There is slight subluxation of the distal phalanx of the thumb relative to the proximal phalanx, though no  evidence of a displaced fracture or dislocation.     XR Ankle Left G/E 3 Views    Result Date: 7/3/2023  EXAM: XR ANKLE LEFT G/E 3 VIEWS LOCATION: [facility] DATE: 7/3/2023 INDICATION: left ankle pain, h o gout COMPARISON: None.     IMPRESSION: No acute fracture or erosion. No foreign body.    Labs cultures notes reviewed, reviewed with orthopedic surgery  Reviewed with patient and patient's son

## 2023-07-06 NOTE — PLAN OF CARE
Goal Outcome Evaluation:      Plan of Care Reviewed With: patient    Overall Patient Progress: improvingOverall Patient Progress: improving     Patient reporting severe pain of 10 after nerve block on foot wore off. Gave indomethacin and hydromorphone 0.4 mg PRN. Per patient pain is only when he moves his foot. When resting pain is only 5. Patient ate 75% of dinner. Up to the bathroom for BM x2. Otherwise patient using urinal. Wife is spending the night. Dressing on hand and foot both clean, dry, intact.

## 2023-07-06 NOTE — PLAN OF CARE
Problem: Infection  Goal: Absence of Infection Signs and Symptoms  Outcome: Progressing   Goal Outcome Evaluation:       Co increased pain today after PT medicated with IV Dilaudid every two  Hours, Indocin changed to scheduled lungs clear no fevers today.  Family here to assist with adls. IV saline locked will need IV antibotic  At home.  Problem: Wound Healing Progression  Goal: Optimal Wound Healing  Outcome: Progressing  Intervention: Promote Wound Healing  Recent Flowsheet Documentation  Taken 7/6/2023 1341 by Zenia Cui RN  Activity Management: activity adjusted per tolerance  Taken 7/6/2023 1300 by Zenia Cui RN  Activity Management: activity adjusted per tolerance  Taken 7/6/2023 1200 by Zenia Cui RN  Activity Management: activity adjusted per tolerance  Taken 7/6/2023 0900 by Zenia Cui RN  Activity Management: activity adjusted per tolerance   Pain severe today in left ankle dressing changed by Dr today   Penrose drain in place moderate amount of bloody drainage.  Ace wrap on, will start right hand soaks and exercises with OT   Today.

## 2023-07-06 NOTE — PROGRESS NOTES
Care Management Follow Up    Length of Stay (days): 3    Expected Discharge Date: 07/07/2023     Concerns to be Addressed:     IV antibiotics   Patient plan of care discussed at interdisciplinary rounds: Yes    Anticipated Discharge Disposition:  Home with family     Anticipated Discharge Services: Home infusion   Anticipated Discharge DME:      Patient/family educated on Medicare website which has current facility and service quality ratings:    Education Provided on the Discharge Plan:  yes  Patient/Family in Agreement with the Plan:  yes    Referrals Placed by CM/SW:  [facility] home infusion  Private pay costs discussed: Not applicable    Additional Information:  SW following, pt currently on IV antibiotics, per ID pt may need IV antibiotics at discharge. Referral to [facility] home infusion.   Therapy recommends home with assist.     ZHEN Kwon

## 2023-07-06 NOTE — CONSULTS
"FOOT AND ANKLE SURGERY/PODIATRY CONSULT NOTE         ASSESSMENT:   Tophaceous gout bilateral feet      TREATMENT:  No additional surgical debridement of the feet recommended at this time.  Continue same.  Podiatry is signing off.  Please call with questions.        HPI: I was asked to see Tyler Miller today to evaluate bilateral feet.  The patient is a pleasant 42-year-old male with a long history of tophaceous gout.  He has multiple tophaceous gout deposits involving both lower and upper extremities.  He was admitted with increasing pain of the right thumb and left ankle.  The patient is status postdebridement of the left ankle by orthopedics.  He was seen at bedside today complaining of moderate surgical pain left ankle.  He indicated that the medication is starting to manage his pain.  He has not noticed any open draining lesions involving the toes of both feet.     History reviewed. No pertinent past medical history.    Social History     Socioeconomic History     Marital status:      Spouse name: Not on file     Number of children: Not on file     Years of education: Not on file     Highest education level: Not on file   Occupational History     Not on file   Tobacco Use     Smoking status: Never     Smokeless tobacco: Not on file   Substance and Sexual Activity     Alcohol use: Not Currently     Drug use: Never     Sexual activity: Not on file   Other Topics Concern     Not on file   Social History Narrative     Not on file     Social Determinants of Health     Financial Resource Strain: Not on file   Food Insecurity: Not on file   Transportation Needs: Not on file   Physical Activity: Not on file   Stress: Not on file   Social Connections: Not on file   Intimate Partner Violence: Not on file   Housing Stability: Not on file          Allergies   Allergen Reactions     Allopurinol      \"Itching throughout body\"          Current Facility-Administered Medications:      acetaminophen (TYLENOL) tablet 650 mg, " 650 mg, Oral, Q6H PRN, 650 mg at 07/05/23 0945 **OR** acetaminophen (TYLENOL) Suppository 650 mg, 650 mg, Rectal, Q6H PRN, Lizette Ku MD     [START ON 7/8/2023] acetaminophen (TYLENOL) tablet 650 mg, 650 mg, Oral, Q4H PRN, Christelle Dia PA-C     acetaminophen (TYLENOL) tablet 975 mg, 975 mg, Oral, Q8H, Christelle Dia PA-C, 975 mg at 07/06/23 0536     aspirin EC tablet 81 mg, 81 mg, Oral, BID, Christelle Dia PA-C, 81 mg at 07/05/23 2103     benzocaine-menthol (CHLORASEPTIC) 6-10 MG lozenge 1 lozenge, 1 lozenge, Buccal, Q1H PRN, Christelle Dia PA-C     bisacodyl (DULCOLAX) suppository 10 mg, 10 mg, Rectal, Daily PRN, Christelle Dia PA-C     cefTRIAXone (ROCEPHIN) 2 g vial to attach to  ml bag for ADULTS or NS 50 ml bag for PEDS, 2 g, Intravenous, Q24H, Kristyn Black MD, 2 g at 07/05/23 2227     clindamycin (CLEOCIN) intermittent infusion in D5W 50 mL 900 mg, 900 mg, Intravenous, Q8H, Kyree Abdullahi MD, Last Rate: 100 mL/hr at 07/05/23 0525, 900 mg at 07/06/23 0535     colchicine (COLCRYS) tablet 0.6 mg, 0.6 mg, Oral, BID, Jaxon HERRING MD, 0.6 mg at 07/05/23 2103     febuxostat (ULORIC) tablet 40 mg, 40 mg, Oral, Daily, Jaxon HERRING MD, 40 mg at 07/04/23 0753     HYDROmorphone (DILAUDID) injection 0.2 mg, 0.2 mg, Intravenous, Q2H PRN **OR** HYDROmorphone (DILAUDID) injection 0.4 mg, 0.4 mg, Intravenous, Q2H PRN, Christelle Dia PA-C, 0.4 mg at 07/06/23 0449     hydrOXYzine (ATARAX) tablet 25 mg, 25 mg, Oral, Q6H PRN, Christelle Dia PA-C     indomethacin (INDOCIN) capsule 50 mg, 50 mg, Oral, TID PRN, Jaxon HERRING MD, 50 mg at 07/05/23 1754     lactated ringers infusion, , Intravenous, Continuous, Christelle Dia PA-C, Last Rate: 100 mL/hr at 07/06/23 0548, New Bag at 07/06/23 0548     lidocaine (LMX4) cream, , Topical, Q1H PRN, Christelle Dia PA-C     lidocaine (LMX4) cream, , Topical, Q1H PRN, Lizette Ku MD     lidocaine 1 % 0.1-1 mL, 0.1-1 mL, Other, Q1H  PRN, Christelle Dia PA-C     lidocaine 1 % 0.1-1 mL, 0.1-1 mL, Other, Q1H PRN, Lizette Ku MD     magnesium hydroxide (MILK OF MAGNESIA) suspension 30 mL, 30 mL, Oral, Daily PRN, Christelle Dia PA-C     melatonin tablet 1 mg, 1 mg, Oral, At Bedtime PRN, Lizette Ku MD     naloxone (NARCAN) injection 0.2 mg, 0.2 mg, Intravenous, Q2 Min PRN **OR** naloxone (NARCAN) injection 0.4 mg, 0.4 mg, Intravenous, Q2 Min PRN **OR** naloxone (NARCAN) injection 0.2 mg, 0.2 mg, Intramuscular, Q2 Min PRN **OR** naloxone (NARCAN) injection 0.4 mg, 0.4 mg, Intramuscular, Q2 Min PRN, Rocky Rizzo MD     ondansetron (ZOFRAN ODT) ODT tab 4 mg, 4 mg, Oral, Q6H PRN **OR** ondansetron (ZOFRAN) injection 4 mg, 4 mg, Intravenous, Q6H PRN, Christelle Dia PA-C     oxyCODONE (ROXICODONE) tablet 5 mg, 5 mg, Oral, Q4H PRN **OR** oxyCODONE (ROXICODONE) tablet 10 mg, 10 mg, Oral, Q4H PRN, Christelle Dia PA-C, 10 mg at 07/05/23 1342     pantoprazole (PROTONIX) EC tablet 40 mg, 40 mg, Oral, QAM NIMA JOSÉ Binod, MD, 40 mg at 07/06/23 0630     polyethylene glycol (MIRALAX) Packet 17 g, 17 g, Oral, Daily, Christelle Dia PA-C     prochlorperazine (COMPAZINE) injection 10 mg, 10 mg, Intravenous, Q6H PRN **OR** prochlorperazine (COMPAZINE) tablet 10 mg, 10 mg, Oral, Q6H PRN, Christelle Dia PA-C     Provider ordered ALTERNATE pre op antibiotic., 1 each, As instructed, Continuous, Park, Edward T, MD, 1 each at 07/05/23 1336     senna-docusate (SENOKOT-S/PERICOLACE) 8.6-50 MG per tablet 1 tablet, 1 tablet, Oral, BID, Christelle Dia PA-C, 1 tablet at 07/05/23 2103     sodium chloride (PF) 0.9% PF flush 3 mL, 3 mL, Intracatheter, Q8H, Dominic Wise MD     sodium chloride (PF) 0.9% PF flush 3 mL, 3 mL, Intracatheter, q1 min prn, Dominic Wise MD     sodium chloride (PF) 0.9% PF flush 3 mL, 3 mL, Intracatheter, Q8H, Christelle Dia PA-C, 3 mL at 07/05/23 0945     sodium chloride (PF) 0.9% PF flush 3 mL, 3 mL,  "Intracatheter, q1 min vicn, Christelle Dia PA-C, 3 mL at 07/05/23 2146     sodium chloride (PF) 0.9% PF flush 3 mL, 3 mL, Intracatheter, Q8H, Lizette Ku MD, 3 mL at 07/04/23 1000     sodium chloride (PF) 0.9% PF flush 3 mL, 3 mL, Intracatheter, q1 min prn, Lizette Ku MD, 3 mL at 07/05/23 1322     sterile water (bottle) irrigation, , , PRN, ParkJermaine MD, 1,000 mL at 07/05/23 0800     History reviewed. No pertinent family history.     Social History     Socioeconomic History     Marital status:      Spouse name: Not on file     Number of children: Not on file     Years of education: Not on file     Highest education level: Not on file   Occupational History     Not on file   Tobacco Use     Smoking status: Never     Smokeless tobacco: Not on file   Substance and Sexual Activity     Alcohol use: Not Currently     Drug use: Never     Sexual activity: Not on file   Other Topics Concern     Not on file   Social History Narrative     Not on file     Social Determinants of Health     Financial Resource Strain: Not on file   Food Insecurity: Not on file   Transportation Needs: Not on file   Physical Activity: Not on file   Stress: Not on file   Social Connections: Not on file   Intimate Partner Violence: Not on file   Housing Stability: Not on file        Review of Systems - Patient denies fever, chills, rash,  stiffness, limping, numbness, weakness, heart burn, blood in stool, chest pain with activity, calf pain when walking, shortness of breath with activity, chronic cough, easy bleeding/bruising, swelling of ankles, excessive thirst, fatigue, depression, anxiety.  Patient admits to moderate surgical pain left ankle.      OBJECTIVE:  Appearance: alert, well appearing, and in no distress and oriented to person, place, and time.    /72 (BP Location: Right arm)   Pulse 84   Temp 98.8  F (37.1  C) (Oral)   Resp 20   Ht 1.626 m (5' 4\")   Wt 81.6 kg (180 lb)   SpO2 93%   BMI 30.90 kg/m       Body " "mass index is 30.9 kg/m .     General appearance: Patient is alert and fully cooperative with history & exam.  No sign of distress is noted during the visit.  Psychiatric: Affect is pleasant & appropriate.  Patient appears motivated to improve health.  Respiratory: Breathing is regular & unlabored while sitting.  HEENT: Hearing is intact to spoken word.  Speech is clear.  No gross evidence of visual impairment that would impact ambulation.    Vascular: Dorsalis pedis and posterior tibial pulses are palpable. There is no pedal hair growth noted.  CFT < 3 sec from anterior tibial surface to distal digits bilaterally. There is no appreciable edema noted.  Dermatologic: Multiple firm tophaceous gouty deposits of bilateral feet.  Turgor and texture are within normal limits. No coloration or temperature changes.  Neurologic: All epicritic and proprioceptive sensations are grossly intact bilaterally.  Musculoskeletal: All active and passive ankle, subtalar, midtarsal, and 1st MPJ range of motion are grossly intact without pain or crepitus, with the exception of none. Manual muscle strength is within normal limits bilaterally. All dorsiflexors, plantarflexors, invertors, evertors are intact bilaterally. Tenderness present to bilateral feet and ankles  on palpation.  Moderate tenderness to bilateral feet with range of motion. Calf is soft/non-tender without warmth/induration    Imaging:       [unfilled]     POC US Guidance Needle Placement    Result Date: 7/5/2023  Ultrasound was performed as guidance to an anesthesia procedure.  Click \"PACS images\" hyperlink below to view any stored images.  For specific procedure details, view procedure note authored by anesthesia.    US Joint Injection Aspiration Major Left    Result Date: 7/4/2023  EXAM: 1. PUNCTURE AND ASPIRATION LEFT ANKLE JOINT 2. ULTRASOUND GUIDANCE LOCATION: [facility] DATE: 7/4/2023 INDICATION: left ankle  known gout, also Strep " bacteremia, evaluate for septic arthritis. PROCEDURE: Informed consent obtained. Time out performed. The site was prepped and draped in sterile fashion. 10 mL of 1% lidocaine was infused into the local soft tissues. Under direct ultrasound guidance, a 22 gauge needle was placed into the fluid pocket and 5 ml of brown, cloudy fluid was aspirated. This was sent for cultures.     IMPRESSION: 1.  Status post ultrasound-guided puncture and aspiration of the left ankle joint.     MR Foot Left w/o & w Contrast    Result Date: 7/4/2023  EXAM: MR FOOT LEFT W/O and W CONTRAST, MR ANKLE LEFT W/O and W CONTRAST LOCATION: [facility] DATE: 7/4/2023 INDICATION: left ankle pain, erosion of 2nd MT and phalanx, RO septic arthritis, gout, vs osteo COMPARISON: None. TECHNIQUE: Routine. Additional postgadolinium T1 sequences were obtained. IV CONTRAST: 8ml Gadavist FINDINGS: JOINTS AND BONES: -Extensive erosive osseous changes are seen in the left foot and ankle most pronounced in the first MTP joint as well as the second metatarsal head with additional erosive foci are seen in the midfoot, second mid and distal phalanx, with large proliferative erosive changes seen in the posterior subtalar joints and posterior aspect of the talar. These proliferative erosive soft tissue masses are relatively hypointense on T1 and isointense on T2-weighted heterogeneous enhancement on postcontrast  imaging. The bone marrow adjacent to the erosive masses is not significantly edematous or enhancing. TENDONS: -There is mild thickening and increased T2 signal of the distal aspect of the Achilles tendon (image 12, series 12). LIGAMENTS: -Lisfranc ligament: Intact. No subluxation. MUSCLES AND SOFT TISSUES: -Extensive large soft tissue masses is are seen in throughout the forefoot and hindfoot, which are low on T1 signal and intermediate T2 signal with variable postcontrast enhancement.     IMPRESSION: 1.  Multifocal extensive large  soft tissue masses with underlying osseous erosion seen in the forefoot and hindfoot, most pronounced at the first MTP joint and second metatarsal head as well as in the posterior aspect of the ankle joint, favored reflect large gouty tophi given the patient's history and enhancement characteristics. 2.  No evidence of drainable soft tissue abscess or definitive evidence of osteomyelitis is seen. 3.  Mild Achilles tendinosis    MR Ankle Left w/o & w Contrast    Result Date: 7/4/2023  EXAM: MR FOOT LEFT W/O and W CONTRAST, MR ANKLE LEFT W/O and W CONTRAST LOCATION: [facility] DATE: 7/4/2023 INDICATION: left ankle pain, erosion of 2nd MT and phalanx, RO septic arthritis, gout, vs osteo COMPARISON: None. TECHNIQUE: Routine. Additional postgadolinium T1 sequences were obtained. IV CONTRAST: 8ml Gadavist FINDINGS: JOINTS AND BONES: -Extensive erosive osseous changes are seen in the left foot and ankle most pronounced in the first MTP joint as well as the second metatarsal head with additional erosive foci are seen in the midfoot, second mid and distal phalanx, with large proliferative erosive changes seen in the posterior subtalar joints and posterior aspect of the talar. These proliferative erosive soft tissue masses are relatively hypointense on T1 and isointense on T2-weighted heterogeneous enhancement on postcontrast  imaging. The bone marrow adjacent to the erosive masses is not significantly edematous or enhancing. TENDONS: -There is mild thickening and increased T2 signal of the distal aspect of the Achilles tendon (image 12, series 12). LIGAMENTS: -Lisfranc ligament: Intact. No subluxation. MUSCLES AND SOFT TISSUES: -Extensive large soft tissue masses is are seen in throughout the forefoot and hindfoot, which are low on T1 signal and intermediate T2 signal with variable postcontrast enhancement.     IMPRESSION: 1.  Multifocal extensive large soft tissue masses with underlying osseous  erosion seen in the forefoot and hindfoot, most pronounced at the first MTP joint and second metatarsal head as well as in the posterior aspect of the ankle joint, favored reflect large gouty tophi given the patient's history and enhancement characteristics. 2.  No evidence of drainable soft tissue abscess or definitive evidence of osteomyelitis is seen. 3.  Mild Achilles tendinosis    XR Foot 3 Views Standing Bilateral    Result Date: 7/3/2023  EXAM: XR FOOT 3 VIEWS STANDING BILATERAL LOCATION: [facility] DATE: 7/3/2023 INDICATION: Chronic bilateral foot wounds. Evaluate osteomyelitis or occult pathology. History of gout. COMPARISON: None.     IMPRESSION: Marked erosive/destructive changes involving the right second MTP joint with extensive destruction of the second metatarsal head and neck as well as the majority of the proximal middle phalanges most likely secondary to gout with marked surrounding soft tissue swelling. Mild periarticular erosive changes involving the right third and fourth TMT and third MTP joint. In the left foot, there are marked periarticular erosive changes involving the MTP joint of the great toe with marked surrounding soft tissue swelling most consistent with gout. Mild periarticular erosive changes involving the second MTP and third TMT joints.     XR Finger Right G/E 2 Views    Result Date: 7/3/2023  EXAM: XR FINGER RIGHT G/E 2 VIEWS LOCATION: [facility] DATE: 7/3/2023 INDICATION: right thumb pain, h o gout COMPARISON: 03/29/2021     IMPRESSION: Marked soft tissue swelling of the proximal soft tissues of the thumb with periarticular osteopenia which likely reflect the sequela of patient's known gout. There is slight subluxation of the distal phalanx of the thumb relative to the proximal phalanx, though no evidence of a displaced fracture or dislocation.     XR Ankle Left G/E 3 Views    Result Date: 7/3/2023  EXAM: XR ANKLE LEFT G/E 3  "VIEWS LOCATION: [facility] DATE: 7/3/2023 INDICATION: left ankle pain, h o gout COMPARISON: None.     IMPRESSION: No acute fracture or erosion. No foreign body.     POC US Guidance Needle Placement    Result Date: 7/5/2023  Ultrasound was performed as guidance to an anesthesia procedure.  Click \"PACS images\" hyperlink below to view any stored images.  For specific procedure details, view procedure note authored by anesthesia.    US Joint Injection Aspiration Major Left    Result Date: 7/4/2023  EXAM: 1. PUNCTURE AND ASPIRATION LEFT ANKLE JOINT 2. ULTRASOUND GUIDANCE LOCATION: [facility] DATE: 7/4/2023 INDICATION: left ankle  known gout, also Strep bacteremia, evaluate for septic arthritis. PROCEDURE: Informed consent obtained. Time out performed. The site was prepped and draped in sterile fashion. 10 mL of 1% lidocaine was infused into the local soft tissues. Under direct ultrasound guidance, a 22 gauge needle was placed into the fluid pocket and 5 ml of brown, cloudy fluid was aspirated. This was sent for cultures.     IMPRESSION: 1.  Status post ultrasound-guided puncture and aspiration of the left ankle joint.     MR Foot Left w/o & w Contrast    Result Date: 7/4/2023  EXAM: MR FOOT LEFT W/O and W CONTRAST, MR ANKLE LEFT W/O and W CONTRAST LOCATION: [facility] DATE: 7/4/2023 INDICATION: left ankle pain, erosion of 2nd MT and phalanx, RO septic arthritis, gout, vs osteo COMPARISON: None. TECHNIQUE: Routine. Additional postgadolinium T1 sequences were obtained. IV CONTRAST: 8ml Gadavist FINDINGS: JOINTS AND BONES: -Extensive erosive osseous changes are seen in the left foot and ankle most pronounced in the first MTP joint as well as the second metatarsal head with additional erosive foci are seen in the midfoot, second mid and distal phalanx, with large proliferative erosive changes seen in the posterior subtalar joints and posterior aspect " of the talar. These proliferative erosive soft tissue masses are relatively hypointense on T1 and isointense on T2-weighted heterogeneous enhancement on postcontrast  imaging. The bone marrow adjacent to the erosive masses is not significantly edematous or enhancing. TENDONS: -There is mild thickening and increased T2 signal of the distal aspect of the Achilles tendon (image 12, series 12). LIGAMENTS: -Lisfranc ligament: Intact. No subluxation. MUSCLES AND SOFT TISSUES: -Extensive large soft tissue masses is are seen in throughout the forefoot and hindfoot, which are low on T1 signal and intermediate T2 signal with variable postcontrast enhancement.     IMPRESSION: 1.  Multifocal extensive large soft tissue masses with underlying osseous erosion seen in the forefoot and hindfoot, most pronounced at the first MTP joint and second metatarsal head as well as in the posterior aspect of the ankle joint, favored reflect large gouty tophi given the patient's history and enhancement characteristics. 2.  No evidence of drainable soft tissue abscess or definitive evidence of osteomyelitis is seen. 3.  Mild Achilles tendinosis    MR Ankle Left w/o & w Contrast    Result Date: 7/4/2023  EXAM: MR FOOT LEFT W/O and W CONTRAST, MR ANKLE LEFT W/O and W CONTRAST LOCATION: [facility] DATE: 7/4/2023 INDICATION: left ankle pain, erosion of 2nd MT and phalanx, RO septic arthritis, gout, vs osteo COMPARISON: None. TECHNIQUE: Routine. Additional postgadolinium T1 sequences were obtained. IV CONTRAST: 8ml Gadavist FINDINGS: JOINTS AND BONES: -Extensive erosive osseous changes are seen in the left foot and ankle most pronounced in the first MTP joint as well as the second metatarsal head with additional erosive foci are seen in the midfoot, second mid and distal phalanx, with large proliferative erosive changes seen in the posterior subtalar joints and posterior aspect of the talar. These proliferative erosive soft  tissue masses are relatively hypointense on T1 and isointense on T2-weighted heterogeneous enhancement on postcontrast  imaging. The bone marrow adjacent to the erosive masses is not significantly edematous or enhancing. TENDONS: -There is mild thickening and increased T2 signal of the distal aspect of the Achilles tendon (image 12, series 12). LIGAMENTS: -Lisfranc ligament: Intact. No subluxation. MUSCLES AND SOFT TISSUES: -Extensive large soft tissue masses is are seen in throughout the forefoot and hindfoot, which are low on T1 signal and intermediate T2 signal with variable postcontrast enhancement.     IMPRESSION: 1.  Multifocal extensive large soft tissue masses with underlying osseous erosion seen in the forefoot and hindfoot, most pronounced at the first MTP joint and second metatarsal head as well as in the posterior aspect of the ankle joint, favored reflect large gouty tophi given the patient's history and enhancement characteristics. 2.  No evidence of drainable soft tissue abscess or definitive evidence of osteomyelitis is seen. 3.  Mild Achilles tendinosis    XR Foot 3 Views Standing Bilateral    Result Date: 7/3/2023  EXAM: XR FOOT 3 VIEWS STANDING BILATERAL LOCATION: [facility] DATE: 7/3/2023 INDICATION: Chronic bilateral foot wounds. Evaluate osteomyelitis or occult pathology. History of gout. COMPARISON: None.     IMPRESSION: Marked erosive/destructive changes involving the right second MTP joint with extensive destruction of the second metatarsal head and neck as well as the majority of the proximal middle phalanges most likely secondary to gout with marked surrounding soft tissue swelling. Mild periarticular erosive changes involving the right third and fourth TMT and third MTP joint. In the left foot, there are marked periarticular erosive changes involving the MTP joint of the great toe with marked surrounding soft tissue swelling most consistent with gout. Mild  periarticular erosive changes involving the second MTP and third TMT joints.     XR Finger Right G/E 2 Views    Result Date: 7/3/2023  EXAM: XR FINGER RIGHT G/E 2 VIEWS LOCATION: [facility] DATE: 7/3/2023 INDICATION: right thumb pain, h o gout COMPARISON: 03/29/2021     IMPRESSION: Marked soft tissue swelling of the proximal soft tissues of the thumb with periarticular osteopenia which likely reflect the sequela of patient's known gout. There is slight subluxation of the distal phalanx of the thumb relative to the proximal phalanx, though no evidence of a displaced fracture or dislocation.     XR Ankle Left G/E 3 Views    Result Date: 7/3/2023  EXAM: XR ANKLE LEFT G/E 3 VIEWS LOCATION: [facility] DATE: 7/3/2023 INDICATION: left ankle pain, h o gout COMPARISON: None.     IMPRESSION: No acute fracture or erosion. No foreign body.     Liborio Oden DPM  [facility] Foot & Ankle Surgery/Podiatry

## 2023-07-06 NOTE — PLAN OF CARE
Problem: Plan of Care - These are the overarching goals to be used throughout the patient stay.    Goal: Absence of Hospital-Acquired Illness or Injury  Intervention: Identify and Manage Fall Risk  Recent Flowsheet Documentation  Taken 7/6/2023 0140 by Rachel Sims RN  Safety Promotion/Fall Prevention:   activity supervised   assistive device/personal items within reach   clutter free environment maintained   increased rounding and observation   increase visualization of patient   nonskid shoes/slippers when out of bed   patient and family education   room organization consistent   safety round/check completed   supervised activity   Goal Outcome Evaluation:       Patient requested and received prn IV dilaudid 0.4 mg for pain rated 7/10. Reported that was helpful at time. R. Hand and left foot dressing in place and wrapped with Ace wraps. No new drainage noted at this time. Spouse has spent night with patient assisting with some needs. Patient uses urinal in bed. Patient continues IV abx therapy. Afebrile.

## 2023-07-06 NOTE — PROGRESS NOTES
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  Patient admitted for acute gout flareup, septic arthritis.    Septic arthritis, likely left ankle  Streptococcus pyogenes bacteremia  Status post I&D 7/5 of left ankle, right pinky, and right hallux  -MRI left foot and ankle reported no evidence of drainable soft tissue abscess or definite evidence of osteomyelitis  - BC from 7/3 positive for Streptococcus pyogenes.  -On 7/4, left ankle aspiration reported gram-positive cocci and urate crystals.  -Orthopedic surgery consulted: Continue IV antibiotics, start soaks for right hand in warm soapy water for 5 to 10 minutes 2-3 times daily, will need wound check in 1 week  -Infectious disease consulted: IV vancomycin changed to IV clindamycin, will be stopped at discharge.  On IV Rocephin for at least 6 weeks.  Patient to remain hospitalized for 3 to 4 days depending on blood cultures and clinical response, dermatology outpatient for multiple skin lesions    Severe tophaceous gout involving multiple joints with acute flareup  -Involves multiple joints: Hand, foot  -Continue twice daily colchicine  -Scheduling indomethacin due to ongoing pain  -On admission uric acid 8.0. ,  Continue PTA Uloric  -Appreciated orthopedic input    Hyponatremia; Fairly stable.  Trend    Chronic anemia; fairly stable despite IV fluid.  Work-up through PCP as an outpatient       Diet: Advance Diet as Tolerated: Regular Diet Adult  Discharge Instruction - Regular Diet Adult    DVT Prophylaxis: Pneumatic Compression Devices  Senior Catheter: Not present  Lines: None     Cardiac Monitoring: None  Code Status: Full Code      Clinically Significant Risk Factors  "          # Hypercalcemia: corrected calcium is >10.1, will monitor as appropriate    # Hypoalbuminemia: Lowest albumin = 2.6 g/dL at 7/5/2023  6:04 PM, will monitor as appropriate     # Hypertension: Noted on problem list        # Obesity: Estimated body mass index is 30.9 kg/m  as calculated from the following:    Height as of this encounter: 1.626 m (5' 4\").    Weight as of this encounter: 81.6 kg (180 lb)., PRESENT ON ADMISSION          Disposition Plan     Expected Discharge Date: 07/07/2023      Destination: home with family  Discharge Comments: IV Genoveva Treviño MD  Hospitalist Service  [facility]  Securely message with [employer] (more info)  Text page via [employer] Paging/Directory   ______________________________________________________________________    Interval History   Mr. Miller had severe pain after PT today.  Will recommend to them not walking him until his pain is better controlled.  Have scheduled indomethacin.  Otherwise is getting oral oxycodone and IV hydromorphone for breakthrough pain.  Discussed with him and his son his antibiotics today and plan.  Other than pain he is not having any other complaints.    Physical Exam   Vital Signs: Temp: 98.2  F (36.8  C) Temp src: Oral BP: 105/69 Pulse: 82   Resp: 19 SpO2: 94 % O2 Device: None (Room air)    Weight: 180 lbs 0 oz    General Appearance: Awake, sleepy, in mild distress and pain  Respiratory: CTAB, no wheeze  Cardiovascular: RRR, no murmur noted  GI: soft, nontender, non distended, normal bowel sounds  Skin: no jaundice, multiple skin lesions noted  MSK: multiple tophaceous lesions at joints noted    Medical Decision Making       61 MINUTES SPENT BY ME on the date of service doing chart review, history, exam, documentation & further activities per the note.      Data     I have personally reviewed the following data over the past 24 hrs:    11.6 (H)  \   9.5 (L)   / 255     132 (L) 100 15.0 /  90   4.6 17 " (L) 1.12 \       ALT: 35 AST: 29 AP: 271 (H) TBILI: 1.1   ALB: 2.6 (L) TOT PROTEIN: 7.5 LIPASE: N/A       Imaging results reviewed over the past 24 hrs:   No results found for this or any previous visit (from the past 24 hour(s)).

## 2023-07-06 NOTE — PROGRESS NOTES
[facility]    Referral received  from AUSTIN Vides, for IV antibiotics.    Benefits verified.   Pt has 100% coverage for IV antibiotics under his [facility] plan, however, there may be a copay upon dispense..    Should patient require home IV antibiotics, writer will speak with pt to review benefits, home infusion and to offer choice of agency/home infusion provider.    Thank you for the referral.    Kaylah Acosta RN, BSN  [facility] Liaison  [phone] (Mon through Fri, 8:00 am-5:00 pm)  [phone] (Office)

## 2023-07-07 LAB
ANION GAP SERPL CALCULATED.3IONS-SCNC: 14 MMOL/L (ref 7–15)
BACTERIA SNV CULT: ABNORMAL
BACTERIA WND CULT: ABNORMAL
BACTERIA WND CULT: NO GROWTH
BUN SERPL-MCNC: 11.4 MG/DL (ref 6–20)
CALCIUM SERPL-MCNC: 9.3 MG/DL (ref 8.6–10)
CHLORIDE SERPL-SCNC: 100 MMOL/L (ref 98–107)
CREAT SERPL-MCNC: 1.03 MG/DL (ref 0.67–1.17)
DEPRECATED HCO3 PLAS-SCNC: 20 MMOL/L (ref 22–29)
ERYTHROCYTE [DISTWIDTH] IN BLOOD BY AUTOMATED COUNT: 18.1 % (ref 10–15)
GFR SERPL CREATININE-BSD FRML MDRD: >90 ML/MIN/1.73M2
GLUCOSE BLDC GLUCOMTR-MCNC: 105 MG/DL (ref 70–99)
GLUCOSE BLDC GLUCOMTR-MCNC: 120 MG/DL (ref 70–99)
GLUCOSE BLDC GLUCOMTR-MCNC: 147 MG/DL (ref 70–99)
GLUCOSE BLDC GLUCOMTR-MCNC: 93 MG/DL (ref 70–99)
GLUCOSE BLDC GLUCOMTR-MCNC: 97 MG/DL (ref 70–99)
GLUCOSE SERPL-MCNC: 87 MG/DL (ref 70–99)
HCT VFR BLD AUTO: 30.7 % (ref 40–53)
HGB BLD-MCNC: 9.3 G/DL (ref 13.3–17.7)
MCH RBC QN AUTO: 20.8 PG (ref 26.5–33)
MCHC RBC AUTO-ENTMCNC: 30.3 G/DL (ref 31.5–36.5)
MCV RBC AUTO: 69 FL (ref 78–100)
PLATELET # BLD AUTO: 310 10E3/UL (ref 150–450)
POTASSIUM SERPL-SCNC: 4.1 MMOL/L (ref 3.4–5.3)
RBC # BLD AUTO: 4.48 10E6/UL (ref 4.4–5.9)
SODIUM SERPL-SCNC: 134 MMOL/L (ref 136–145)
WBC # BLD AUTO: 12.5 10E3/UL (ref 4–11)

## 2023-07-07 PROCEDURE — 250N000013 HC RX MED GY IP 250 OP 250 PS 637: Performed by: INTERNAL MEDICINE

## 2023-07-07 PROCEDURE — 99232 SBSQ HOSP IP/OBS MODERATE 35: CPT | Performed by: INTERNAL MEDICINE

## 2023-07-07 PROCEDURE — 120N000001 HC R&B MED SURG/OB

## 2023-07-07 PROCEDURE — 85027 COMPLETE CBC AUTOMATED: CPT | Performed by: INTERNAL MEDICINE

## 2023-07-07 PROCEDURE — 80048 BASIC METABOLIC PNL TOTAL CA: CPT | Performed by: INTERNAL MEDICINE

## 2023-07-07 PROCEDURE — 250N000011 HC RX IP 250 OP 636: Mod: JZ

## 2023-07-07 PROCEDURE — 250N000011 HC RX IP 250 OP 636: Mod: JZ | Performed by: INTERNAL MEDICINE

## 2023-07-07 PROCEDURE — 36415 COLL VENOUS BLD VENIPUNCTURE: CPT | Performed by: INTERNAL MEDICINE

## 2023-07-07 PROCEDURE — 250N000013 HC RX MED GY IP 250 OP 250 PS 637: Performed by: HOSPITALIST

## 2023-07-07 PROCEDURE — 250N000011 HC RX IP 250 OP 636: Mod: JZ | Performed by: PHYSICIAN ASSISTANT

## 2023-07-07 PROCEDURE — 250N000013 HC RX MED GY IP 250 OP 250 PS 637: Performed by: PHYSICIAN ASSISTANT

## 2023-07-07 PROCEDURE — 258N000003 HC RX IP 258 OP 636

## 2023-07-07 RX ORDER — CEFTRIAXONE 2 G/1
2 INJECTION, POWDER, FOR SOLUTION INTRAMUSCULAR; INTRAVENOUS EVERY 24 HOURS
Qty: 760 ML | Refills: 0 | Status: ON HOLD
Start: 2023-07-08 | End: 2023-07-16

## 2023-07-07 RX ADMIN — HYDROMORPHONE HYDROCHLORIDE 0.4 MG: 0.2 INJECTION, SOLUTION INTRAMUSCULAR; INTRAVENOUS; SUBCUTANEOUS at 21:37

## 2023-07-07 RX ADMIN — ACETAMINOPHEN 975 MG: 325 TABLET ORAL at 14:52

## 2023-07-07 RX ADMIN — HYDROMORPHONE HYDROCHLORIDE 0.4 MG: 0.2 INJECTION, SOLUTION INTRAMUSCULAR; INTRAVENOUS; SUBCUTANEOUS at 02:52

## 2023-07-07 RX ADMIN — HYDROMORPHONE HYDROCHLORIDE 0.4 MG: 0.2 INJECTION, SOLUTION INTRAMUSCULAR; INTRAVENOUS; SUBCUTANEOUS at 04:55

## 2023-07-07 RX ADMIN — POLYETHYLENE GLYCOL 3350 17 G: 17 POWDER, FOR SOLUTION ORAL at 08:03

## 2023-07-07 RX ADMIN — HYDROMORPHONE HYDROCHLORIDE 0.4 MG: 0.2 INJECTION, SOLUTION INTRAMUSCULAR; INTRAVENOUS; SUBCUTANEOUS at 23:25

## 2023-07-07 RX ADMIN — DEXTROSE MONOHYDRATE 900 MG: 50 INJECTION, SOLUTION INTRAVENOUS at 12:43

## 2023-07-07 RX ADMIN — HYDROMORPHONE HYDROCHLORIDE 0.4 MG: 0.2 INJECTION, SOLUTION INTRAMUSCULAR; INTRAVENOUS; SUBCUTANEOUS at 08:03

## 2023-07-07 RX ADMIN — SENNOSIDES AND DOCUSATE SODIUM 1 TABLET: 50; 8.6 TABLET ORAL at 20:46

## 2023-07-07 RX ADMIN — ACETAMINOPHEN 975 MG: 325 TABLET ORAL at 05:03

## 2023-07-07 RX ADMIN — HYDROXYZINE HYDROCHLORIDE 25 MG: 25 TABLET, FILM COATED ORAL at 01:08

## 2023-07-07 RX ADMIN — HYDROMORPHONE HYDROCHLORIDE 0.4 MG: 0.2 INJECTION, SOLUTION INTRAMUSCULAR; INTRAVENOUS; SUBCUTANEOUS at 10:39

## 2023-07-07 RX ADMIN — HYDROMORPHONE HYDROCHLORIDE 0.4 MG: 0.2 INJECTION, SOLUTION INTRAMUSCULAR; INTRAVENOUS; SUBCUTANEOUS at 12:43

## 2023-07-07 RX ADMIN — HYDROMORPHONE HYDROCHLORIDE 0.4 MG: 0.2 INJECTION, SOLUTION INTRAMUSCULAR; INTRAVENOUS; SUBCUTANEOUS at 17:01

## 2023-07-07 RX ADMIN — PANTOPRAZOLE SODIUM 40 MG: 40 TABLET, DELAYED RELEASE ORAL at 06:43

## 2023-07-07 RX ADMIN — INDOMETHACIN 50 MG: 25 CAPSULE ORAL at 08:08

## 2023-07-07 RX ADMIN — OXYCODONE HYDROCHLORIDE 10 MG: 5 TABLET ORAL at 02:23

## 2023-07-07 RX ADMIN — COLCHICINE 0.6 MG: 0.6 TABLET ORAL at 20:46

## 2023-07-07 RX ADMIN — ACETAMINOPHEN 650 MG: 325 TABLET ORAL at 01:08

## 2023-07-07 RX ADMIN — FEBUXOSTAT 40 MG: 40 TABLET, FILM COATED ORAL at 08:08

## 2023-07-07 RX ADMIN — INDOMETHACIN 50 MG: 25 CAPSULE ORAL at 17:01

## 2023-07-07 RX ADMIN — Medication 81 MG: at 08:07

## 2023-07-07 RX ADMIN — SENNOSIDES AND DOCUSATE SODIUM 1 TABLET: 50; 8.6 TABLET ORAL at 08:09

## 2023-07-07 RX ADMIN — CEFTRIAXONE SODIUM 2 G: 2 INJECTION, POWDER, FOR SOLUTION INTRAMUSCULAR; INTRAVENOUS at 21:36

## 2023-07-07 RX ADMIN — ACETAMINOPHEN 975 MG: 325 TABLET ORAL at 21:37

## 2023-07-07 RX ADMIN — Medication 81 MG: at 20:47

## 2023-07-07 RX ADMIN — HYDROMORPHONE HYDROCHLORIDE 0.4 MG: 0.2 INJECTION, SOLUTION INTRAMUSCULAR; INTRAVENOUS; SUBCUTANEOUS at 14:52

## 2023-07-07 RX ADMIN — COLCHICINE 0.6 MG: 0.6 TABLET ORAL at 08:09

## 2023-07-07 RX ADMIN — DEXTROSE MONOHYDRATE 900 MG: 50 INJECTION, SOLUTION INTRAVENOUS at 05:04

## 2023-07-07 RX ADMIN — DEXTROSE MONOHYDRATE 900 MG: 50 INJECTION, SOLUTION INTRAVENOUS at 20:47

## 2023-07-07 RX ADMIN — HYDROMORPHONE HYDROCHLORIDE 0.4 MG: 0.2 INJECTION, SOLUTION INTRAMUSCULAR; INTRAVENOUS; SUBCUTANEOUS at 19:25

## 2023-07-07 RX ADMIN — HYDROMORPHONE HYDROCHLORIDE 0.4 MG: 0.2 INJECTION, SOLUTION INTRAMUSCULAR; INTRAVENOUS; SUBCUTANEOUS at 00:48

## 2023-07-07 ASSESSMENT — ACTIVITIES OF DAILY LIVING (ADL)
ADLS_ACUITY_SCORE: 24

## 2023-07-07 NOTE — PLAN OF CARE
"  Problem: Plan of Care - These are the overarching goals to be used throughout the patient stay.    Goal: Plan of Care Review  Description: The Plan of Care Review/Shift note should be completed every shift.  The Outcome Evaluation is a brief statement about your assessment that the patient is improving, declining, or no change.  This information will be displayed automatically on your shift note.  Outcome: Progressing   Goal Outcome Evaluation:             Pt pleasant and coopertive with all cares and treatments.  A/O x 4    Pt up and ambulating to the bathroom with use of a walker, refuses gait belt and is steady on his foot with holding the left foot up while using the walker to get to the bathroom.  Pt is also refusing to use any PCD intervention as he stats that this hurts too much.    Pt using prn Dialudid IVP consistently and asking for it every 2 hours. He has been given 0.4 MG IVP every 2 hours x 5  this shift.  This writer did speak wit the Pharm D and asked if a pain consult yuriy be beneficial for this pt as the pt is requiring IVP Dilaudid every 2 hours or the pain is unbearable. Pt stated that he is very happy that \"we stayed on the every 2 hour pain medication schedule, because if we go over to like 3 hours that's too long\". Left foot has been elevated on 2 pillows almost entire shift.    WBC this am 12.5  Blood Cultures are still pending    Family is here now wife and two kids, son has left.    Right hand dressing to thumb is noted to have some old sero sang drainage, pt is asking to wait to do his hand soak as his kids were here, and now has agreed to his soak which was done and re-wrapped this procedure has been done x 2 this shift.  Ortho surg here this am and changed the dressing to the pt's left foot with the penrose drain, this writer was unable to view this.    Will cont to monitor this pt and alert the MD of any status changes.           "

## 2023-07-07 NOTE — PLAN OF CARE
Problem: Plan of Care - These are the overarching goals to be used throughout the patient stay.    Goal: Optimal Comfort and Wellbeing  Outcome: Progressing  Intervention: Monitor Pain and Promote Comfort  Recent Flowsheet Documentation  Taken 7/7/2023 0208 by Lyla Varela RN  Pain Management Interventions: (oxycodone can be given at 02:22) medication (see MAR)  Taken 7/7/2023 0108 by Lyla Varela RN  Pain Management Interventions:    medication (see MAR)    environmental changes    emotional support  Taken 7/7/2023 0048 by Lyla Varela RN  Pain Management Interventions:    medication (see MAR)    environmental changes    emotional support  Taken 7/6/2023 2222 by Lyla Varela RN  Pain Management Interventions:    medication (see MAR)    environmental changes    emotional support  Taken 7/6/2023 2130 by Lyla Varela RN  Pain Management Interventions:    medication (see MAR)    environmental changes    emotional support  Taken 7/6/2023 2100 by Lyla Varela RN  Pain Management Interventions:    medication (see MAR)    emotional support    environmental changes  Taken 7/6/2023 2040 by Lyla Varela RN  Pain Management Interventions:    medication (see MAR)    emotional support  Goal: Readiness for Transition of Care  Outcome: Progressing     Problem: Sepsis/Septic Shock  Goal: Absence of Infection Signs and Symptoms  Outcome: Progressing  Intervention: Promote Recovery  Recent Flowsheet Documentation  Taken 7/7/2023 0010 by Lyla Varela RN  Activity Management: activity adjusted per tolerance  Taken 7/6/2023 2040 by Lyla Varela RN  Activity Management: activity adjusted per tolerance  Goal: Optimal Nutrition Intake  Outcome: Progressing     Problem: Infection  Goal: Absence of Infection Signs and Symptoms  Outcome: Progressing     Problem: Wound Healing Progression  Goal: Optimal Wound Healing  Outcome: Progressing  Intervention: Promote Wound Healing  Recent Flowsheet  Documentation  Taken 7/7/2023 0208 by Lyla Varela RN  Pain Management Interventions: (oxycodone can be given at 02:22) medication (see MAR)  Taken 7/7/2023 0108 by Lyla Varela RN  Pain Management Interventions:    medication (see MAR)    environmental changes    emotional support  Taken 7/7/2023 0048 by Lyla Varela RN  Pain Management Interventions:    medication (see MAR)    environmental changes    emotional support  Taken 7/7/2023 0010 by Lyla Varela RN  Activity Management: activity adjusted per tolerance  Taken 7/6/2023 2222 by Lyla Varela RN  Pain Management Interventions:    medication (see MAR)    environmental changes    emotional support  Taken 7/6/2023 2130 by Lyla Varela RN  Pain Management Interventions:    medication (see MAR)    environmental changes    emotional support  Taken 7/6/2023 2100 by Lyla Varela RN  Pain Management Interventions:    medication (see MAR)    emotional support    environmental changes  Taken 7/6/2023 2040 by Lyla Varela RN  Pain Management Interventions:    medication (see MAR)    emotional support  Activity Management: activity adjusted per tolerance   Goal Outcome Evaluation:         Patient alert and oriented. VSS overnight. Patient medicated frequently overnight due to left foot and right hand pain. See MAR. Cross cover paged at 04:44. All PRN pain meds given and patient rating pain as 10/10. Pain increased when patient ambulated to the bathroom to have BM. Right thumb soaked in warm soapy water per order and wrapped in Kerlix. LLE ace wrap remains in place. LLE ace wrap not to be removed by RN per order. No drainage on outside of ace wrap. LLE elevated on multiple pillows. Patient up with 1-2 assist and walker to bathroom and is weight bearing as tolerated to LLE.     Lyla Varela RN

## 2023-07-07 NOTE — PROGRESS NOTES
"Orthopedic Progress Note      Assessment: 2 Day Post-Op  S/P Procedure(s):  IRRIGATION AND DEBRIDEMENT, RIGHT THUMB, RIGHT SMALL FINGER   IRRIGATION AND DEBRIDEMENT arthroscopic INCISION AND DRAINAGE, FOOT AND ANKLE including left ankle arthroscopy and subtalar arthroscopy and debridement    Plan:   - Continue PT/OT  - Weightbearing status: WBAT LLE  - Activity: Up with assist and assistive device until independent.  - Anticoagulation: ASA 81 PO BID in addition to SCDs, braden stockings and early ambulation.  - Antibiotics: Per ID.  Appreciate recs  - Pain Management; transition from IV to PO as tolerated  - Diet: progress diet as tolerated  - Labs: hgb 9.3, transfuse if <7.0. No indication today  - Dressing: Keep dry and intact  - Right hand warm soapy water soaks three times daily.  - Elevation: Elevate LLE on pillow to keep above the level of the heart as much as possible  - Disposition: Plan to discharge home with home cares.  No indication for repeat washout out at this time.  Will continue to monitor for any worsening or need for washout over the weekend.    Subjective:  Pain: severe  Nausea, Vomiting:  No  Lightheadedness, Dizziness:  No  Neuro:  Patient denies new onset numbness or paresthesias  Fever, chills: No  Chest pain: No  SOB: No    Patient reports feeling well today. Patient reports pain controlled with IV dilaudid, only temporary control.  Not controlled on oral meds.  Severe pain when doing PT. Patient eating and drinking well. Patient voiding and passing gas and BM. All questions/concerns answered.      Objective:  /89 (BP Location: Right arm)   Pulse 93   Temp 98.2  F (36.8  C) (Oral)   Resp 18   Ht 1.626 m (5' 4\")   Wt 81.6 kg (180 lb)   SpO2 99%   BMI 30.90 kg/m      The patient is A&Ox3. Appears comfortable, lying in bed  Calves without tenderness, neg Krista's  Brisk capillary refill in the toes.   Palpable Left dorsalis pedis pulse. Left foot warm & well-perfused.  Sensation is " intact to light touch & equal bilaterally in the femoral, DP, SP & tibial nerve distributions.  ROM: Appropriately flexes & extends all toes bilaterally.   Motor: No active DF/PF  Leg lengths equal.  Foot dressing removed and replaced during today's visit. Drain removed.    Right hand incision C/D/I  Erythema and tophaceous gout of thumb  Wiggles fingers appropriately  Neurovascularly intact    Pertinent Labs   Lab Results: personally reviewed.   No results found for: INR, PROTIME  Lab Results   Component Value Date    WBC 12.5 (H) 07/07/2023    HGB 9.3 (L) 07/07/2023    HCT 30.7 (L) 07/07/2023    MCV 69 (L) 07/07/2023     07/07/2023     Lab Results   Component Value Date     (L) 07/07/2023    CO2 20 (L) 07/07/2023         Report completed by:  JOHNNY SRIVASTAVA PA-C  Date: 7/7/2023  Time: 9:44 AM  [facility]

## 2023-07-08 LAB
GLUCOSE BLDC GLUCOMTR-MCNC: 102 MG/DL (ref 70–99)
GLUCOSE BLDC GLUCOMTR-MCNC: 108 MG/DL (ref 70–99)
GLUCOSE BLDC GLUCOMTR-MCNC: 111 MG/DL (ref 70–99)
GLUCOSE BLDC GLUCOMTR-MCNC: 116 MG/DL (ref 70–99)

## 2023-07-08 PROCEDURE — 250N000011 HC RX IP 250 OP 636: Mod: JZ

## 2023-07-08 PROCEDURE — 250N000011 HC RX IP 250 OP 636: Mod: JZ | Performed by: PHYSICIAN ASSISTANT

## 2023-07-08 PROCEDURE — 250N000013 HC RX MED GY IP 250 OP 250 PS 637: Performed by: PHYSICIAN ASSISTANT

## 2023-07-08 PROCEDURE — 250N000013 HC RX MED GY IP 250 OP 250 PS 637: Performed by: INTERNAL MEDICINE

## 2023-07-08 PROCEDURE — 120N000001 HC R&B MED SURG/OB

## 2023-07-08 PROCEDURE — 250N000011 HC RX IP 250 OP 636: Mod: JZ | Performed by: INTERNAL MEDICINE

## 2023-07-08 PROCEDURE — 99232 SBSQ HOSP IP/OBS MODERATE 35: CPT | Performed by: HOSPITALIST

## 2023-07-08 PROCEDURE — 258N000003 HC RX IP 258 OP 636

## 2023-07-08 RX ADMIN — INDOMETHACIN 50 MG: 25 CAPSULE ORAL at 16:00

## 2023-07-08 RX ADMIN — Medication 81 MG: at 20:16

## 2023-07-08 RX ADMIN — HYDROXYZINE HYDROCHLORIDE 25 MG: 25 TABLET, FILM COATED ORAL at 22:18

## 2023-07-08 RX ADMIN — COLCHICINE 0.6 MG: 0.6 TABLET ORAL at 08:51

## 2023-07-08 RX ADMIN — HYDROMORPHONE HYDROCHLORIDE 0.4 MG: 0.2 INJECTION, SOLUTION INTRAMUSCULAR; INTRAVENOUS; SUBCUTANEOUS at 10:11

## 2023-07-08 RX ADMIN — DEXTROSE MONOHYDRATE 900 MG: 50 INJECTION, SOLUTION INTRAVENOUS at 12:32

## 2023-07-08 RX ADMIN — Medication 81 MG: at 08:51

## 2023-07-08 RX ADMIN — HYDROMORPHONE HYDROCHLORIDE 0.4 MG: 0.2 INJECTION, SOLUTION INTRAMUSCULAR; INTRAVENOUS; SUBCUTANEOUS at 22:18

## 2023-07-08 RX ADMIN — HYDROMORPHONE HYDROCHLORIDE 0.4 MG: 0.2 INJECTION, SOLUTION INTRAMUSCULAR; INTRAVENOUS; SUBCUTANEOUS at 01:38

## 2023-07-08 RX ADMIN — INDOMETHACIN 50 MG: 25 CAPSULE ORAL at 08:52

## 2023-07-08 RX ADMIN — HYDROMORPHONE HYDROCHLORIDE 0.4 MG: 0.2 INJECTION, SOLUTION INTRAMUSCULAR; INTRAVENOUS; SUBCUTANEOUS at 03:45

## 2023-07-08 RX ADMIN — COLCHICINE 0.6 MG: 0.6 TABLET ORAL at 20:16

## 2023-07-08 RX ADMIN — PANTOPRAZOLE SODIUM 40 MG: 40 TABLET, DELAYED RELEASE ORAL at 06:42

## 2023-07-08 RX ADMIN — OXYCODONE HYDROCHLORIDE 10 MG: 5 TABLET ORAL at 16:00

## 2023-07-08 RX ADMIN — HYDROMORPHONE HYDROCHLORIDE 0.4 MG: 0.2 INJECTION, SOLUTION INTRAMUSCULAR; INTRAVENOUS; SUBCUTANEOUS at 14:32

## 2023-07-08 RX ADMIN — HYDROMORPHONE HYDROCHLORIDE 0.4 MG: 0.2 INJECTION, SOLUTION INTRAMUSCULAR; INTRAVENOUS; SUBCUTANEOUS at 05:30

## 2023-07-08 RX ADMIN — OXYCODONE HYDROCHLORIDE 10 MG: 5 TABLET ORAL at 20:45

## 2023-07-08 RX ADMIN — OXYCODONE HYDROCHLORIDE 10 MG: 5 TABLET ORAL at 05:46

## 2023-07-08 RX ADMIN — DEXTROSE MONOHYDRATE 900 MG: 50 INJECTION, SOLUTION INTRAVENOUS at 20:16

## 2023-07-08 RX ADMIN — CEFTRIAXONE SODIUM 2 G: 2 INJECTION, POWDER, FOR SOLUTION INTRAMUSCULAR; INTRAVENOUS at 21:28

## 2023-07-08 RX ADMIN — HYDROXYZINE HYDROCHLORIDE 25 MG: 25 TABLET, FILM COATED ORAL at 08:51

## 2023-07-08 RX ADMIN — HYDROMORPHONE HYDROCHLORIDE 0.4 MG: 0.2 INJECTION, SOLUTION INTRAMUSCULAR; INTRAVENOUS; SUBCUTANEOUS at 08:01

## 2023-07-08 RX ADMIN — DEXTROSE MONOHYDRATE 900 MG: 50 INJECTION, SOLUTION INTRAVENOUS at 05:31

## 2023-07-08 RX ADMIN — SENNOSIDES AND DOCUSATE SODIUM 1 TABLET: 50; 8.6 TABLET ORAL at 20:16

## 2023-07-08 RX ADMIN — OXYCODONE HYDROCHLORIDE 10 MG: 5 TABLET ORAL at 12:02

## 2023-07-08 RX ADMIN — FEBUXOSTAT 40 MG: 40 TABLET, FILM COATED ORAL at 08:51

## 2023-07-08 RX ADMIN — ACETAMINOPHEN 975 MG: 325 TABLET ORAL at 05:30

## 2023-07-08 RX ADMIN — HYDROXYZINE HYDROCHLORIDE 25 MG: 25 TABLET, FILM COATED ORAL at 16:04

## 2023-07-08 ASSESSMENT — ACTIVITIES OF DAILY LIVING (ADL)
ADLS_ACUITY_SCORE: 29
ADLS_ACUITY_SCORE: 24
ADLS_ACUITY_SCORE: 23
ADLS_ACUITY_SCORE: 23
ADLS_ACUITY_SCORE: 24
ADLS_ACUITY_SCORE: 24
ADLS_ACUITY_SCORE: 23
ADLS_ACUITY_SCORE: 24
ADLS_ACUITY_SCORE: 29
ADLS_ACUITY_SCORE: 24

## 2023-07-08 NOTE — PROGRESS NOTES
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  Patient admitted for acute gout flareup, septic arthritis.      Status post I&D 7/5 of left ankle, right pinky, and right hallux    7/7 :       No fevers  ID, ortho following  Follow blood cultures from 7/5 for 72 hrs  Continue ceftriaxone for at least 6 weeks      Plan to discharge home Once blood cultures from 7/5/2023 are negative for 72 hours      A/p :         Septic arthritis, likely left ankle    Streptococcus pyogenes bacteremia    -MRI left foot and ankle reported no evidence of drainable soft tissue abscess or definite evidence of osteomyelitis  - BC from 7/3 positive for Streptococcus pyogenes.  -On 7/4, left ankle aspiration reported gram-positive cocci and urate crystals.  -Orthopedic surgery consulted: Continue IV antibiotics, start soaks for right hand in warm soapy water for 5 to 10 minutes 2-3 times daily, will need wound check in 1 week  -Infectious disease consulted: IV vancomycin changed to IV clindamycin, will be stopped at discharge.  On IV Rocephin for at least 6 weeks.  Patient to remain hospitalized for 3 to 4 days depending on blood cultures and clinical response, dermatology outpatient for multiple skin lesions    7/7 :     No fevers  ID following  Follow blood cultures from 7/5 for 72 hrs  Continue ceftriaxone for at least 6 weeks      Severe tophaceous gout involving multiple joints with acute flareup  -Involves multiple joints: Hand, foot  -Continue twice daily colchicine  -Scheduling indomethacin due to ongoing pain  -On admission uric acid 8.0. ,  Continue PTA Uloric  -Appreciated orthopedic input      Hyponatremia; Fairly stable.   "Trend      Chronic anemia; fairly stable despite IV fluid.  Work-up through PCP as an outpatient       Diet: Advance Diet as Tolerated: Regular Diet Adult  Discharge Instruction - Regular Diet Adult    DVT Prophylaxis: Pneumatic Compression Devices  Senior Catheter: Not present  Lines: None     Cardiac Monitoring: None  Code Status: Full Code      Clinically Significant Risk Factors              # Hypoalbuminemia: Lowest albumin = 2.6 g/dL at 7/5/2023  6:04 PM, will monitor as appropriate     # Hypertension: Noted on problem list        # Obesity: Estimated body mass index is 30.9 kg/m  as calculated from the following:    Height as of this encounter: 1.626 m (5' 4\").    Weight as of this encounter: 81.6 kg (180 lb).           Disposition Plan      Expected Discharge Date: 07/08/2023      Destination: home with family  Discharge Comments: IV Rocephin- pending cultures  pain control          Mark Jacques MD  Hospitalist Service  [facility]  Securely message with [employer] (more info)  Text page via [employer] Paging/Directory   ______________________________________________________________________    Physical Exam   Vital Signs: Temp: 99.2  F (37.3  C) Temp src: Oral BP: 121/77 Pulse: 82   Resp: 16 SpO2: 94 % O2 Device: None (Room air)    Weight: 180 lbs 0 oz    General Appearance: Awake, sleepy, in mild distress and pain  Respiratory: CTAB, no wheeze  Cardiovascular: RRR, no murmur noted  GI: soft, nontender, non distended, normal bowel sounds  Skin: no jaundice, multiple skin lesions noted  MSK: multiple tophaceous lesions at joints noted    Medical Decision Making       61 MINUTES SPENT BY ME on the date of service doing chart review, history, exam, documentation & further activities per the note.      Data     I have personally reviewed the following data over the past 24 hrs:    12.5 (H)  \   9.3 (L)   / 310     134 (L) 100 11.4 /  147 (H)   4.1 20 (L) 1.03 \       Imaging results reviewed over " the past 24 hrs:   No results found for this or any previous visit (from the past 24 hour(s)).

## 2023-07-08 NOTE — PLAN OF CARE
"  Problem: Plan of Care - These are the overarching goals to be used throughout the patient stay.    Goal: Plan of Care Review  Description: The Plan of Care Review/Shift note should be completed every shift.  The Outcome Evaluation is a brief statement about your assessment that the patient is improving, declining, or no change.  This information will be displayed automatically on your shift note.  Outcome: Progressing   Goal Outcome Evaluation:    A&O x4. RA. LLE remains wrapped in ace bandage. LLE is hot to touch, balta in color and patient reported severe tenderness in area. Pain has been controlled with PRN IV dilaudid. Patient requested to be awaken every two hours for dosing. Patient stated if dosing is not on time the pain in LLE with be too severe to handle and patient does not want that to happen again. Refused PRN PO oxy because \"it is not as effective and does not work right away.\" Education was given in regards to pain management. Patient was open on trying PO oxy and atarax for pain.  Voiding spontaneously.     BP (!) 137/90 (BP Location: Right arm, Patient Position: Supine)   Pulse 82   Temp 98.3  F (36.8  C) (Oral)   Resp 16   Ht 1.626 m (5' 4\")   Wt 81.6 kg (180 lb)   SpO2 95%   BMI 30.90 kg/m          "

## 2023-07-08 NOTE — PLAN OF CARE
Problem: Plan of Care - These are the overarching goals to be used throughout the patient stay.    Goal: Plan of Care Review  Description: The Plan of Care Review/Shift note should be completed every shift.  The Outcome Evaluation is a brief statement about your assessment that the patient is improving, declining, or no change.  This information will be displayed automatically on your shift note.  Outcome: Progressing    Problem: Infection  Goal: Absence of Infection Signs and Symptoms  Outcome: Progressing     Problem: Wound Healing Progression  Goal: Optimal Wound Healing  Outcome: Progressing    Pt is alert and oriented. Hmong speaking, knows some English. Pt rates left foot/ankle pain 8-10/10. Requests PRN Dilaudid Q2 hrs. Also receives PRN Oxycodone and PRN Atarax. Pt appears comfortable. Hand soaked x's 1 today. New dressing applied to foot/ankle. Redness improving on Left foot. Pt will be going home with IV ABX. Blood cultures pending. Gout flare up on bilateral hands/feet.

## 2023-07-08 NOTE — CONSULTS
"Consult for IV antibiotics at discharge         Per ID:  \"I, ceftriaxone, Inject 2 g into the vein every 24 hours for 38 days\"  Infuse over 30 minutes.   On 7/7: \"Patient remain hospitalized at least for 3 to 4 days depending on blood culture results, clinical response\"       1018: RNCM place referral to [facility] ([facility]).    [facility] called CM, patient is covered 100% for IV antibiotics.   I team will reach out to patient to schedule.    RNCM spoke to I RN, Natacha, who works on scheduling.  They are available for teach on Monday, and Sunday antibiotics can be delivered.  Patient has been getting this antibiotic every night at 2200.  So patient will need to stay in the hospital until at least Monday, to get the antibiotic Sunday night, then have the home infusion teach Monday prior to discharge.      Plan per CM: Discharge Monday (if medically ready) after I teach.         "

## 2023-07-08 NOTE — PROGRESS NOTES
"[facility]    Medicine Progress Note - Hospitalist Service    Date of Admission:  7/3/2023    Assessment & Plan   Patient is a 42 year old male with severe tophaceous gout involving multiple joints admitted for septic arthritis, bacteremia and acute gout flare.    #Septic arthritis  #Strep pyogenes bacteremia  S/p I&D L ankle and R hand 7/5  Ortho following  ID following- needs Rocephin x6 weeks, switched IV vanc to clindamycin (not planning to continue on discharge)    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  Colchicine  Indomethacin  Uloric    #Hyponatremia, mild  Stable    #Acute on chronic anemia  Monitor    #Elevated blood pressure  Likely due to pain  Monitor      DVT ppx: PCDs and ASA BID     Diet: Advance Diet as Tolerated: Regular Diet Adult  Discharge Instruction - Regular Diet Adult    Senior Catheter: Not present  Lines: None     Cardiac Monitoring: None  Code Status: Full Code      Clinically Significant Risk Factors              # Hypoalbuminemia: Lowest albumin = 2.6 g/dL at 7/5/2023  6:04 PM, will monitor as appropriate     # Hypertension: Noted on problem list        # Obesity: Estimated body mass index is 30.9 kg/m  as calculated from the following:    Height as of this encounter: 1.626 m (5' 4\").    Weight as of this encounter: 81.6 kg (180 lb).           Disposition Plan     Expected Discharge Date: 07/08/2023      Destination: home with family  Discharge Comments: IV Rocephin- pending cultures  pain control          Phill Mullins,   Hospitalist Service  [facility]  Securely message with [employer] (more info)  Text page via [employer] Paging/Directory   ______________________________________________________________________    Interval History   No acute events overnight    Physical Exam   Vital Signs: Temp: 99.2  F (37.3  C) Temp src: Oral BP: (!) 148/102 Pulse: 95   Resp: 18 SpO2: 98 % O2 Device: None (Room air)    Weight: 180 " lbs 0 oz    General Appearance:  No acute distress  Respiratory: Clear to auscultation bilaterally  Cardiovascular: Regular rate and rhythm  Musculoskeletal: Tophi present in multiple joints of the hands, elbows and feet. L foot has dressing, L shin proximal to dressing is erythemic    Medical Decision Making             Data         Imaging results reviewed over the past 24 hrs:   No results found for this or any previous visit (from the past 24 hour(s)).

## 2023-07-08 NOTE — PROGRESS NOTES
"Orthopedic Progress Note      Assessment: 3 Day Post-Op  S/P Procedure(s):  IRRIGATION AND DEBRIDEMENT, RIGHT THUMB, RIGHT SMALL FINGER   IRRIGATION AND DEBRIDEMENT arthroscopic INCISION AND DRAINAGE, FOOT AND ANKLE including left ankle arthroscopy and subtalar arthroscopy and debridement    Plan:   - Continue PT/OT  - Weightbearing status: WBAT LLE  - Activity: Up with assist and assistive device until independent.  - Anticoagulation: ASA 81 PO BID in addition to SCDs, braden stockings and early ambulation.  - Antibiotics: Per ID.  Appreciate recs  - Pain Management; transition from IV to PO as tolerated  - Diet: progress diet as tolerated  - Labs: stable  - Dressing: Keep dry and intact  - Right hand warm soapy water soaks three times daily.  - Elevation: Elevate LLE on pillow to keep above the level of the heart as much as possible  - Disposition: Plan to discharge home with home cares.  We will continue to monitor drainage and clinical symptoms.  Today he is stable without worsening erythema or fevers.    Subjective:  Pain: severe  Nausea, Vomiting:  No  Lightheadedness, Dizziness:  No  Neuro:  Patient denies new onset numbness or paresthesias  Fever, chills: No  Chest pain: No  SOB: No    Pain is still significant although notes mildly improved since yesterday.  Notes the redness is slowly improving although still present.  No fever or chills.    Objective:  BP (!) 148/102 (BP Location: Right arm)   Pulse 95   Temp 99.2  F (37.3  C) (Oral)   Resp 18   Ht 1.626 m (5' 4\")   Wt 81.6 kg (180 lb)   SpO2 98%   BMI 30.90 kg/m      The patient is A&Ox3. Appears comfortable, lying in bed  Calves without tenderness, neg Krista's  Brisk capillary refill in the toes.   Palpable Left dorsalis pedis pulse. Left foot warm & well-perfused.  Sensation is intact to light touch & equal bilaterally in the femoral, DP, SP & tibial nerve distributions.  ROM: Appropriately flexes & extends all toes bilaterally.   Motor: No active " DF/PF  Leg lengths equal.  Foot dressing removed and replaced during today's visit.     There is a small amount of gouty fluid draining from his anterior lateral portal.  Remainder of the portal sites are benign.  I expressed a bit of cloudy fluid at the bedside from this portal.  New dressing placed.    Right hand incision C/D/I  Erythema and tophaceous gout of thumb  Wiggles fingers appropriately  Neurovascularly intact    Pertinent Labs   Lab Results: personally reviewed.   No results found for: INR, PROTIME  Lab Results   Component Value Date    WBC 12.5 (H) 07/07/2023    HGB 9.3 (L) 07/07/2023    HCT 30.7 (L) 07/07/2023    MCV 69 (L) 07/07/2023     07/07/2023     Lab Results   Component Value Date     (L) 07/07/2023    CO2 20 (L) 07/07/2023         GONZALEZ SCHULZ MD

## 2023-07-09 ENCOUNTER — APPOINTMENT (OUTPATIENT)
Dept: MRI IMAGING | Facility: HOSPITAL | Age: 43
End: 2023-07-09
Payer: COMMERCIAL

## 2023-07-09 LAB
ANION GAP SERPL CALCULATED.3IONS-SCNC: 14 MMOL/L (ref 7–15)
BASOPHILS # BLD AUTO: 0 10E3/UL (ref 0–0.2)
BASOPHILS NFR BLD AUTO: 0 %
BUN SERPL-MCNC: 12.3 MG/DL (ref 6–20)
CALCIUM SERPL-MCNC: 9.2 MG/DL (ref 8.6–10)
CHLORIDE SERPL-SCNC: 96 MMOL/L (ref 98–107)
CREAT SERPL-MCNC: 1.17 MG/DL (ref 0.67–1.17)
DEPRECATED HCO3 PLAS-SCNC: 19 MMOL/L (ref 22–29)
EOSINOPHIL # BLD AUTO: 0.2 10E3/UL (ref 0–0.7)
EOSINOPHIL NFR BLD AUTO: 1 %
ERYTHROCYTE [DISTWIDTH] IN BLOOD BY AUTOMATED COUNT: 17.9 % (ref 10–15)
GFR SERPL CREATININE-BSD FRML MDRD: 81 ML/MIN/1.73M2
GLUCOSE SERPL-MCNC: 92 MG/DL (ref 70–99)
HCT VFR BLD AUTO: 30.4 % (ref 40–53)
HGB BLD-MCNC: 9.4 G/DL (ref 13.3–17.7)
IMM GRANULOCYTES # BLD: 0.4 10E3/UL
IMM GRANULOCYTES NFR BLD: 3 %
LYMPHOCYTES # BLD AUTO: 2.1 10E3/UL (ref 0.8–5.3)
LYMPHOCYTES NFR BLD AUTO: 14 %
MCH RBC QN AUTO: 20.8 PG (ref 26.5–33)
MCHC RBC AUTO-ENTMCNC: 30.9 G/DL (ref 31.5–36.5)
MCV RBC AUTO: 67 FL (ref 78–100)
MONOCYTES # BLD AUTO: 1 10E3/UL (ref 0–1.3)
MONOCYTES NFR BLD AUTO: 7 %
NEUTROPHILS # BLD AUTO: 11 10E3/UL (ref 1.6–8.3)
NEUTROPHILS NFR BLD AUTO: 75 %
NRBC # BLD AUTO: 0 10E3/UL
NRBC BLD AUTO-RTO: 0 /100
PLATELET # BLD AUTO: 429 10E3/UL (ref 150–450)
POTASSIUM SERPL-SCNC: 4 MMOL/L (ref 3.4–5.3)
RBC # BLD AUTO: 4.53 10E6/UL (ref 4.4–5.9)
SODIUM SERPL-SCNC: 129 MMOL/L (ref 136–145)
WBC # BLD AUTO: 14.7 10E3/UL (ref 4–11)

## 2023-07-09 PROCEDURE — 250N000011 HC RX IP 250 OP 636: Mod: JZ | Performed by: INTERNAL MEDICINE

## 2023-07-09 PROCEDURE — 73723 MRI JOINT LWR EXTR W/O&W/DYE: CPT | Mod: LT

## 2023-07-09 PROCEDURE — 36415 COLL VENOUS BLD VENIPUNCTURE: CPT | Performed by: HOSPITALIST

## 2023-07-09 PROCEDURE — 255N000002 HC RX 255 OP 636: Mod: JZ | Performed by: HOSPITALIST

## 2023-07-09 PROCEDURE — 99232 SBSQ HOSP IP/OBS MODERATE 35: CPT | Performed by: HOSPITALIST

## 2023-07-09 PROCEDURE — 85004 AUTOMATED DIFF WBC COUNT: CPT | Performed by: HOSPITALIST

## 2023-07-09 PROCEDURE — 250N000013 HC RX MED GY IP 250 OP 250 PS 637: Performed by: INTERNAL MEDICINE

## 2023-07-09 PROCEDURE — 258N000003 HC RX IP 258 OP 636

## 2023-07-09 PROCEDURE — 250N000011 HC RX IP 250 OP 636: Mod: JZ

## 2023-07-09 PROCEDURE — 73720 MRI LWR EXTREMITY W/O&W/DYE: CPT | Mod: LT

## 2023-07-09 PROCEDURE — 250N000011 HC RX IP 250 OP 636: Mod: JZ | Performed by: PHYSICIAN ASSISTANT

## 2023-07-09 PROCEDURE — 250N000013 HC RX MED GY IP 250 OP 250 PS 637: Performed by: PHYSICIAN ASSISTANT

## 2023-07-09 PROCEDURE — 120N000001 HC R&B MED SURG/OB

## 2023-07-09 PROCEDURE — 80048 BASIC METABOLIC PNL TOTAL CA: CPT | Performed by: HOSPITALIST

## 2023-07-09 PROCEDURE — A9585 GADOBUTROL INJECTION: HCPCS | Mod: JZ | Performed by: HOSPITALIST

## 2023-07-09 RX ORDER — LIDOCAINE 40 MG/G
CREAM TOPICAL
Status: DISCONTINUED | OUTPATIENT
Start: 2023-07-09 | End: 2023-07-10 | Stop reason: HOSPADM

## 2023-07-09 RX ORDER — GADOBUTROL 604.72 MG/ML
8 INJECTION INTRAVENOUS ONCE
Status: COMPLETED | OUTPATIENT
Start: 2023-07-09 | End: 2023-07-09

## 2023-07-09 RX ADMIN — HYDROMORPHONE HYDROCHLORIDE 0.4 MG: 0.2 INJECTION, SOLUTION INTRAMUSCULAR; INTRAVENOUS; SUBCUTANEOUS at 02:01

## 2023-07-09 RX ADMIN — OXYCODONE HYDROCHLORIDE 5 MG: 5 TABLET ORAL at 12:48

## 2023-07-09 RX ADMIN — HYDROMORPHONE HYDROCHLORIDE 0.4 MG: 0.2 INJECTION, SOLUTION INTRAMUSCULAR; INTRAVENOUS; SUBCUTANEOUS at 11:48

## 2023-07-09 RX ADMIN — OXYCODONE HYDROCHLORIDE 10 MG: 5 TABLET ORAL at 08:13

## 2023-07-09 RX ADMIN — HYDROMORPHONE HYDROCHLORIDE 0.2 MG: 0.2 INJECTION, SOLUTION INTRAMUSCULAR; INTRAVENOUS; SUBCUTANEOUS at 23:26

## 2023-07-09 RX ADMIN — FEBUXOSTAT 40 MG: 40 TABLET, FILM COATED ORAL at 08:13

## 2023-07-09 RX ADMIN — OXYCODONE HYDROCHLORIDE 5 MG: 5 TABLET ORAL at 22:03

## 2023-07-09 RX ADMIN — PANTOPRAZOLE SODIUM 40 MG: 40 TABLET, DELAYED RELEASE ORAL at 06:31

## 2023-07-09 RX ADMIN — HYDROMORPHONE HYDROCHLORIDE 0.4 MG: 0.2 INJECTION, SOLUTION INTRAMUSCULAR; INTRAVENOUS; SUBCUTANEOUS at 00:11

## 2023-07-09 RX ADMIN — CEFTRIAXONE SODIUM 2 G: 2 INJECTION, POWDER, FOR SOLUTION INTRAMUSCULAR; INTRAVENOUS at 22:04

## 2023-07-09 RX ADMIN — HYDROMORPHONE HYDROCHLORIDE 0.4 MG: 0.2 INJECTION, SOLUTION INTRAMUSCULAR; INTRAVENOUS; SUBCUTANEOUS at 06:31

## 2023-07-09 RX ADMIN — HYDROMORPHONE HYDROCHLORIDE 0.2 MG: 0.2 INJECTION, SOLUTION INTRAMUSCULAR; INTRAVENOUS; SUBCUTANEOUS at 04:09

## 2023-07-09 RX ADMIN — INDOMETHACIN 50 MG: 25 CAPSULE ORAL at 08:50

## 2023-07-09 RX ADMIN — HYDROXYZINE HYDROCHLORIDE 25 MG: 25 TABLET, FILM COATED ORAL at 18:53

## 2023-07-09 RX ADMIN — DEXTROSE MONOHYDRATE 900 MG: 50 INJECTION, SOLUTION INTRAVENOUS at 04:09

## 2023-07-09 RX ADMIN — ACETAMINOPHEN 650 MG: 325 TABLET ORAL at 05:01

## 2023-07-09 RX ADMIN — HYDROXYZINE HYDROCHLORIDE 25 MG: 25 TABLET, FILM COATED ORAL at 04:56

## 2023-07-09 RX ADMIN — GADOBUTROL 8 ML: 604.72 INJECTION INTRAVENOUS at 11:07

## 2023-07-09 RX ADMIN — HYDROXYZINE HYDROCHLORIDE 25 MG: 25 TABLET, FILM COATED ORAL at 11:47

## 2023-07-09 RX ADMIN — OXYCODONE HYDROCHLORIDE 10 MG: 5 TABLET ORAL at 04:09

## 2023-07-09 RX ADMIN — OXYCODONE HYDROCHLORIDE 10 MG: 5 TABLET ORAL at 17:12

## 2023-07-09 RX ADMIN — HYDROMORPHONE HYDROCHLORIDE 0.4 MG: 0.2 INJECTION, SOLUTION INTRAMUSCULAR; INTRAVENOUS; SUBCUTANEOUS at 08:50

## 2023-07-09 RX ADMIN — SENNOSIDES AND DOCUSATE SODIUM 1 TABLET: 50; 8.6 TABLET ORAL at 20:45

## 2023-07-09 RX ADMIN — COLCHICINE 0.6 MG: 0.6 TABLET ORAL at 20:45

## 2023-07-09 RX ADMIN — ACETAMINOPHEN 650 MG: 325 TABLET ORAL at 01:14

## 2023-07-09 RX ADMIN — HYDROMORPHONE HYDROCHLORIDE 0.4 MG: 0.2 INJECTION, SOLUTION INTRAMUSCULAR; INTRAVENOUS; SUBCUTANEOUS at 15:13

## 2023-07-09 RX ADMIN — Medication 81 MG: at 20:45

## 2023-07-09 RX ADMIN — DEXTROSE MONOHYDRATE 900 MG: 50 INJECTION, SOLUTION INTRAVENOUS at 12:42

## 2023-07-09 RX ADMIN — COLCHICINE 0.6 MG: 0.6 TABLET ORAL at 08:13

## 2023-07-09 RX ADMIN — OXYCODONE HYDROCHLORIDE 10 MG: 5 TABLET ORAL at 00:11

## 2023-07-09 RX ADMIN — Medication 81 MG: at 08:13

## 2023-07-09 RX ADMIN — INDOMETHACIN 50 MG: 25 CAPSULE ORAL at 17:13

## 2023-07-09 RX ADMIN — SENNOSIDES AND DOCUSATE SODIUM 1 TABLET: 50; 8.6 TABLET ORAL at 08:13

## 2023-07-09 RX ADMIN — DEXTROSE MONOHYDRATE 900 MG: 50 INJECTION, SOLUTION INTRAVENOUS at 20:45

## 2023-07-09 ASSESSMENT — ACTIVITIES OF DAILY LIVING (ADL)
ADLS_ACUITY_SCORE: 29
ADLS_ACUITY_SCORE: 25
ADLS_ACUITY_SCORE: 29
ADLS_ACUITY_SCORE: 25
ADLS_ACUITY_SCORE: 24
ADLS_ACUITY_SCORE: 25
ADLS_ACUITY_SCORE: 25
ADLS_ACUITY_SCORE: 29

## 2023-07-09 NOTE — PLAN OF CARE
Goal Outcome Evaluation:  Patient c/o to left foot rated 6-8/10. PRN Atarax, Oxycodone and Dilaudid given as ordered. Patient continues to rate 6/10 pain on reassessment. MRI to foot and ankle done this shift. Patient declined PICC placement, stating he would like it placed on the day of discharge. PICC nurse stated to call back when pt is ready and  cleared for discharge. Hand soaked per order.

## 2023-07-09 NOTE — PLAN OF CARE
"  Problem: Plan of Care - These are the overarching goals to be used throughout the patient stay.    Goal: Plan of Care Review  Description: The Plan of Care Review/Shift note should be completed every shift.  The Outcome Evaluation is a brief statement about your assessment that the patient is improving, declining, or no change.  This information will be displayed automatically on your shift note.  Outcome: Progressing   Goal Outcome Evaluation:    Primarily Hmong speaking. A&O x4. RA. VSS. Afebrile. Voiding spontaneously. Patient did not have a restful night. Reported pain on left ankle rating it an 8-10/10 on the pain scale. Pain became unbearable around midnight and continued throughout the night. Patient requested to be medicated q2 hours. Pain management plan was discussed. Patient stated \"the pain is most unbearable after 2 hours of getting medication, I cannot tolerate the pain if it get's to the point of when I have to call you for pain meds. I would like to stay on top of it\"     Patient specifically requested for the indomethacin stating \"the green oval medicine seems to work best.\" Explained to patient, indomethacin is a scheduled med and will be given to patient once it is time to administer it. Encouraged patient to try other non-pharmacological interventions such as ice, aroma therapy and music. Patient attempted music therapy, massaging LLE and breathing techniques which seems to help distract the patient momentarily.     Wife arrived early and provided emotional support which was a great help in managing patient's pain.Tolerating PO intakes well. Will continue to monitor for any acute changes.     /75 (BP Location: Right arm)   Pulse 70   Temp 98.1  F (36.7  C) (Oral)   Resp 20   Ht 1.626 m (5' 4\")   Wt 81.6 kg (180 lb)   SpO2 95%   BMI 30.90 kg/m          "

## 2023-07-09 NOTE — PROGRESS NOTES
"Orthopedic Progress Note      Assessment: 4 Days Post-Op  S/P Procedure(s):  IRRIGATION AND DEBRIDEMENT, RIGHT THUMB, RIGHT SMALL FINGER     Plan:   -repeat MRI of left foot and ankle for assess for accumulation of abscess and consideration of repeat I&D  -Continue PT/OT  - Weightbearing status: WBAT LLE  - Activity: Up with assist and assistive device until independent.  - Anticoagulation: ASA 81 PO BID in addition to SCDs, braden stockings and early ambulation.  - Antibiotics: Per ID.  Appreciate recs  - Pain Management; transition from IV to PO as tolerated  - Diet: progress diet as tolerated  - Labs: stable  - Dressing: Keep dry and intact  - Right hand warm soapy water soaks three times daily.  - Elevation: Elevate LLE on pillow to keep above the level of the heart as much as possible  - Disposition: Plan to discharge home with home cares.  We will continue to monitor drainage and clinical symptoms.  Today he is stable without worsening erythema or fevers.    Subjective:  Pain: severe  Nausea, Vomiting:  No  Lightheadedness, Dizziness:  No  Neuro:  Patient denies new onset numbness or paresthesias  Fever, chills: No  Chest pain: No  SOB: No    Patient explains via translation with RN that swelling and erythema are stable if not better but he gained great relief from I&D of left ankle and would like surgical excision of left wrist tophus as well as repeat I&D of right hand and left ankle. I explained I&D was primarily for removal of infectious material and not for therapeutics however I would show pictures from visit to surgeon on-call.     Objective:  /83 (BP Location: Right arm)   Pulse 83   Temp 98  F (36.7  C) (Oral)   Resp 16   Ht 1.626 m (5' 4\")   Wt 81.6 kg (180 lb)   SpO2 96%   BMI 30.90 kg/m    The patient is A&Ox3. Appears comfortable.   Sensation is intact.  Dorsiflexion and plantar appreciated but limited. Able to wiggle toes.   Dorsalis pedis not palpable. Brisk capillary refill.   Calves " are soft and non-tender. Negative Krista's.  The incision is covered. Dressing anterior/lateral arthroscopic incision producing purulent/gouty fluid with expression of surrounding tissue. Dressing replaced. Other left ankle incisions and right hand incisions C/D/I. Right hand with palpable radial pulse, NV intact.         Pertinent Labs   Lab Results: personally reviewed.   No results found for: INR, PROTIME  Lab Results   Component Value Date    WBC 14.7 (H) 07/09/2023    HGB 9.4 (L) 07/09/2023    HCT 30.4 (L) 07/09/2023    MCV 67 (L) 07/09/2023     07/09/2023     Lab Results   Component Value Date     (L) 07/07/2023    CO2 20 (L) 07/07/2023         Report completed by:  Rashid Camargo PA-C, JUAN PABLO  Date: 7/9/2023  Time: 8:30 AM

## 2023-07-09 NOTE — PROCEDURES
This PICC RN went to talk with patient to consent him for PICC line and for placement of line using the [employer]  via the phone. After discussion, the patient stated that he would like to wait to place PICC line until he is ready for discharge. The patient stated that he was not sure if he would have any more surgery before discharge. The patient does have a PIV in place. Please notify PICC team when the patient is ready for placement. Thank you.

## 2023-07-09 NOTE — PLAN OF CARE
Patient tolerating diet. Pain controlled with prn oxycodone and dilaudid. Independent in room. Finger soaked and dressing changed. IV abx infusing per orders.     Darlene Rodriguez RN

## 2023-07-09 NOTE — PROGRESS NOTES
"[facility]    Medicine Progress Note - Hospitalist Service    Date of Admission:  7/3/2023    Assessment & Plan   Patient is a 42 year old male with severe tophaceous gout involving multiple joints admitted for septic arthritis, bacteremia and acute gout flare.    #Septic arthritis  #Strep pyogenes bacteremia  S/p I&D L ankle and R hand 7/5  Ortho following- repeat MRI L foot/ankle today to evaluate for further infection  ID following- needs Rocephin x6 weeks, switched IV vanc to clindamycin (not planning to continue on discharge)  Repeat blood cultures 7/5 and 7/6 negative thus far  Place PICC line for home infusions    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  Colchicine  Indomethacin  Uloric    #Hyponatremia, mild  Stable    #Acute on chronic anemia  Monitor    #Elevated blood pressure  Likely due to pain  Monitor      DVT ppx: PCDs and ASA BID       Diet: Advance Diet as Tolerated: Regular Diet Adult  Discharge Instruction - Regular Diet Adult    Senior Catheter: Not present  Lines: None     Cardiac Monitoring: None  Code Status: Full Code      Clinically Significant Risk Factors         # Hyponatremia: Lowest Na = 129 mmol/L in last 2 days, will monitor as appropriate      # Hypoalbuminemia: Lowest albumin = 2.6 g/dL at 7/5/2023  6:04 PM, will monitor as appropriate     # Hypertension: Noted on problem list        # Obesity: Estimated body mass index is 30.9 kg/m  as calculated from the following:    Height as of this encounter: 1.626 m (5' 4\").    Weight as of this encounter: 81.6 kg (180 lb).           Disposition Plan      Expected Discharge Date: 07/10/2023      Destination: home with family  Discharge Comments: IV Rocephin- pending cultures  pain control  [facility] teach on Monday          Phill Mullins DO  Hospitalist Service  [facility]  Securely message with [employer] (more info)  Text page via [employer] Paging/Directory "   ______________________________________________________________________    Interval History   Patient reports pain in the L ankle/foot overnight, better controlled today. No fevers.     Physical Exam   Vital Signs: Temp: 98  F (36.7  C) Temp src: Oral BP: 121/83 Pulse: 83   Resp: 16 SpO2: 96 % O2 Device: None (Room air)    Weight: 180 lbs 0 oz    General Appearance:  No acute distress  Respiratory: Clear to auscultation bilaterally  Cardiovascular: Regular rate and rhythm  Musculoskeletal: Tophi present in multiple joints of the hands, elbows and feet. L foot has dressing, L shin proximal to dressing is less erythemic    Medical Decision Making             Data     I have personally reviewed the following data over the past 24 hrs:    14.7 (H)  \   9.4 (L)   / 429     129 (L) 96 (L) 12.3 /  92   4.0 19 (L) 1.17 \       Imaging results reviewed over the past 24 hrs:   Recent Results (from the past 24 hour(s))   MR Ankle Left w/o & w Contrast    Narrative    EXAM: MR ANKLE LEFT WITHOUT AND WITH CONTRAST, MR FOOT LEFT WITHOUT AND WITH CONTRAST  LOCATION: [facility]  DATE: 07/09/2023    INDICATION: Assess for reaccumulation of abscess. Bacteremia and septic arthritis.  COMPARISON: 07/04/2023.  TECHNIQUE: Routine. Additional postgadolinium T1 sequences were obtained.  IV CONTRAST: 8 mL Gadavist.    FINDINGS:   Again seen are extensive tophus formation with erosion in the forefoot and hindfoot compatible with gouty arthropathy. Superimposed infection within the areas of tophus formation is difficult to exclude by imaging as there is considerable surrounding   soft tissue enhancement along the peripheral aspect of the tophus as well as extensive soft tissue edema and inflammation. Tophus formation is seen about the joints as well as within the subcutaneous soft tissues.    There is tophus formation intermixed with complex fluid along the anterolateral aspect of the ankle superficial to the  lateral ankle ligaments and fibula as seen on axial image 23. There is patchy bone marrow edema along portions of the talus, and   calcaneus. Patchy bone marrow edema across the TMT joints.    The fluid in the tibiotalar and subtalar joints has decreased, noting there is a small amount of residual fluid in the tibiotalar joint.    There is multifocal tenosynovitis with moderate to severe tenosynovitis along the medial flexor tendons where there is complex fluid.    There is no evidence of a drainable soft tissue abscess or definitive evidence of osteomyelitis. No significant joint effusion within the forefoot or midfoot.    No acute tendon abnormality, with Achilles tendinosis again seen, with intratendinous tophus.    There is chronic appearing osteonecrosis of the distal tibia. No definitive evidence of osteomyelitis.    There is considerable dorsal subcutaneous edema and reticulation without a well-defined rim-enhancing dorsal fluid collection. This is presumably inflammatory or infectious.      Impression    IMPRESSION:  1.  Extensive tophus formation and soft tissue inflammation about the forefoot and hindfoot compatible with gouty arthropathy. Superimposed infection within the areas of tophus formation is difficult to exclude by imaging.  2.  The amount of joint fluid in the tibiotalar and subtalar joints has decreased from the prior exam, with a small amount of residual versus recurrent fluid.  3.  Multifocal tenosynovitis, most pronounced along the medial flexor tendons where there is complex tenosynovial fluid.  4.  Along the anterolateral ankle superficial to the lateral ankle ligaments and fibula, there is tophus formation intermixed with superimposed poorly defined defined fluid along the superficial aspect of the tophus. Infection at this site is difficult   to exclude.  5.  No definitive evidence of osteomyelitis, noting there is reactive bone marrow edema primarily in the hindfoot and ankle.  6.    Chronic appearing osteonecrosis of the distal tibia.     MR Foot Left w/o & w Contrast    Narrative    EXAM: MR ANKLE LEFT WITHOUT AND WITH CONTRAST, MR FOOT LEFT WITHOUT AND WITH CONTRAST  LOCATION: [facility]  DATE: 07/09/2023    INDICATION: Assess for reaccumulation of abscess. Bacteremia and septic arthritis.  COMPARISON: 07/04/2023.  TECHNIQUE: Routine. Additional postgadolinium T1 sequences were obtained.  IV CONTRAST: 8 mL Gadavist.    FINDINGS:   Again seen are extensive tophus formation with erosion in the forefoot and hindfoot compatible with gouty arthropathy. Superimposed infection within the areas of tophus formation is difficult to exclude by imaging as there is considerable surrounding   soft tissue enhancement along the peripheral aspect of the tophus as well as extensive soft tissue edema and inflammation. Tophus formation is seen about the joints as well as within the subcutaneous soft tissues.    There is tophus formation intermixed with complex fluid along the anterolateral aspect of the ankle superficial to the lateral ankle ligaments and fibula as seen on axial image 23. There is patchy bone marrow edema along portions of the talus, and   calcaneus. Patchy bone marrow edema across the TMT joints.    The fluid in the tibiotalar and subtalar joints has decreased, noting there is a small amount of residual fluid in the tibiotalar joint.    There is multifocal tenosynovitis with moderate to severe tenosynovitis along the medial flexor tendons where there is complex fluid.    There is no evidence of a drainable soft tissue abscess or definitive evidence of osteomyelitis. No significant joint effusion within the forefoot or midfoot.    No acute tendon abnormality, with Achilles tendinosis again seen, with intratendinous tophus.    There is chronic appearing osteonecrosis of the distal tibia. No definitive evidence of osteomyelitis.    There is considerable dorsal  subcutaneous edema and reticulation without a well-defined rim-enhancing dorsal fluid collection. This is presumably inflammatory or infectious.      Impression    IMPRESSION:  1.  Extensive tophus formation and soft tissue inflammation about the forefoot and hindfoot compatible with gouty arthropathy. Superimposed infection within the areas of tophus formation is difficult to exclude by imaging.  2.  The amount of joint fluid in the tibiotalar and subtalar joints has decreased from the prior exam, with a small amount of residual versus recurrent fluid.  3.  Multifocal tenosynovitis, most pronounced along the medial flexor tendons where there is complex tenosynovial fluid.  4.  Along the anterolateral ankle superficial to the lateral ankle ligaments and fibula, there is tophus formation intermixed with superimposed poorly defined defined fluid along the superficial aspect of the tophus. Infection at this site is difficult   to exclude.  5.  No definitive evidence of osteomyelitis, noting there is reactive bone marrow edema primarily in the hindfoot and ankle.  6.   Chronic appearing osteonecrosis of the distal tibia.

## 2023-07-10 ENCOUNTER — APPOINTMENT (OUTPATIENT)
Dept: PHYSICAL THERAPY | Facility: HOSPITAL | Age: 43
End: 2023-07-10
Payer: COMMERCIAL

## 2023-07-10 ENCOUNTER — APPOINTMENT (OUTPATIENT)
Dept: OCCUPATIONAL THERAPY | Facility: HOSPITAL | Age: 43
End: 2023-07-10
Payer: COMMERCIAL

## 2023-07-10 VITALS
SYSTOLIC BLOOD PRESSURE: 132 MMHG | OXYGEN SATURATION: 98 % | HEART RATE: 88 BPM | BODY MASS INDEX: 29.24 KG/M2 | WEIGHT: 171.3 LBS | DIASTOLIC BLOOD PRESSURE: 85 MMHG | TEMPERATURE: 98.2 F | HEIGHT: 64 IN | RESPIRATION RATE: 18 BRPM

## 2023-07-10 LAB
ANION GAP SERPL CALCULATED.3IONS-SCNC: 14 MMOL/L (ref 7–15)
BACTERIA BLD CULT: NO GROWTH
BACTERIA BLD CULT: NO GROWTH
BASOPHILS # BLD AUTO: 0.1 10E3/UL (ref 0–0.2)
BASOPHILS NFR BLD AUTO: 1 %
BUN SERPL-MCNC: 18.7 MG/DL (ref 6–20)
CALCIUM SERPL-MCNC: 9.4 MG/DL (ref 8.6–10)
CHLORIDE SERPL-SCNC: 99 MMOL/L (ref 98–107)
CREAT SERPL-MCNC: 1.15 MG/DL (ref 0.67–1.17)
DEPRECATED HCO3 PLAS-SCNC: 20 MMOL/L (ref 22–29)
EOSINOPHIL # BLD AUTO: 0.2 10E3/UL (ref 0–0.7)
EOSINOPHIL NFR BLD AUTO: 1 %
ERYTHROCYTE [DISTWIDTH] IN BLOOD BY AUTOMATED COUNT: 18.2 % (ref 10–15)
GFR SERPL CREATININE-BSD FRML MDRD: 83 ML/MIN/1.73M2
GLUCOSE SERPL-MCNC: 96 MG/DL (ref 70–99)
HCT VFR BLD AUTO: 30.1 % (ref 40–53)
HGB BLD-MCNC: 9.1 G/DL (ref 13.3–17.7)
IMM GRANULOCYTES # BLD: 0.3 10E3/UL
IMM GRANULOCYTES NFR BLD: 2 %
LYMPHOCYTES # BLD AUTO: 1.9 10E3/UL (ref 0.8–5.3)
LYMPHOCYTES NFR BLD AUTO: 14 %
MCH RBC QN AUTO: 20.5 PG (ref 26.5–33)
MCHC RBC AUTO-ENTMCNC: 30.2 G/DL (ref 31.5–36.5)
MCV RBC AUTO: 68 FL (ref 78–100)
MONOCYTES # BLD AUTO: 0.8 10E3/UL (ref 0–1.3)
MONOCYTES NFR BLD AUTO: 6 %
NEUTROPHILS # BLD AUTO: 9.9 10E3/UL (ref 1.6–8.3)
NEUTROPHILS NFR BLD AUTO: 76 %
NRBC # BLD AUTO: 0 10E3/UL
NRBC BLD AUTO-RTO: 0 /100
PLATELET # BLD AUTO: 524 10E3/UL (ref 150–450)
POTASSIUM SERPL-SCNC: 4.7 MMOL/L (ref 3.4–5.3)
RBC # BLD AUTO: 4.44 10E6/UL (ref 4.4–5.9)
SODIUM SERPL-SCNC: 133 MMOL/L (ref 136–145)
WBC # BLD AUTO: 13 10E3/UL (ref 4–11)

## 2023-07-10 PROCEDURE — 250N000013 HC RX MED GY IP 250 OP 250 PS 637: Performed by: INTERNAL MEDICINE

## 2023-07-10 PROCEDURE — 258N000003 HC RX IP 258 OP 636

## 2023-07-10 PROCEDURE — 250N000013 HC RX MED GY IP 250 OP 250 PS 637: Performed by: PHYSICIAN ASSISTANT

## 2023-07-10 PROCEDURE — 97116 GAIT TRAINING THERAPY: CPT | Mod: GP

## 2023-07-10 PROCEDURE — 272N000450 HC KIT 4FR POWER PICC SINGLE LUMEN

## 2023-07-10 PROCEDURE — 82310 ASSAY OF CALCIUM: CPT | Performed by: HOSPITALIST

## 2023-07-10 PROCEDURE — 97535 SELF CARE MNGMENT TRAINING: CPT | Mod: GO

## 2023-07-10 PROCEDURE — 99239 HOSP IP/OBS DSCHRG MGMT >30: CPT | Performed by: HOSPITALIST

## 2023-07-10 PROCEDURE — 250N000013 HC RX MED GY IP 250 OP 250 PS 637: Performed by: HOSPITALIST

## 2023-07-10 PROCEDURE — 36415 COLL VENOUS BLD VENIPUNCTURE: CPT | Performed by: HOSPITALIST

## 2023-07-10 PROCEDURE — 250N000009 HC RX 250: Performed by: HOSPITALIST

## 2023-07-10 PROCEDURE — 85004 AUTOMATED DIFF WBC COUNT: CPT | Performed by: HOSPITALIST

## 2023-07-10 PROCEDURE — 97110 THERAPEUTIC EXERCISES: CPT | Mod: GO

## 2023-07-10 PROCEDURE — 250N000011 HC RX IP 250 OP 636: Mod: JZ

## 2023-07-10 PROCEDURE — 36569 INSJ PICC 5 YR+ W/O IMAGING: CPT

## 2023-07-10 RX ORDER — COLCHICINE 0.6 MG/1
0.6 TABLET ORAL 2 TIMES DAILY
Qty: 60 TABLET | Refills: 3 | Status: SHIPPED | OUTPATIENT
Start: 2023-07-10

## 2023-07-10 RX ORDER — INDOMETHACIN 50 MG/1
50 CAPSULE ORAL 2 TIMES DAILY WITH MEALS
Qty: 60 CAPSULE | Refills: 0 | Status: SHIPPED | OUTPATIENT
Start: 2023-07-10

## 2023-07-10 RX ADMIN — LIDOCAINE HYDROCHLORIDE 2 ML: 10 INJECTION, SOLUTION EPIDURAL; INFILTRATION; INTRACAUDAL; PERINEURAL at 13:38

## 2023-07-10 RX ADMIN — INDOMETHACIN 50 MG: 25 CAPSULE ORAL at 08:01

## 2023-07-10 RX ADMIN — DEXTROSE MONOHYDRATE 900 MG: 50 INJECTION, SOLUTION INTRAVENOUS at 12:27

## 2023-07-10 RX ADMIN — OXYCODONE HYDROCHLORIDE 5 MG: 5 TABLET ORAL at 09:56

## 2023-07-10 RX ADMIN — OXYCODONE HYDROCHLORIDE 10 MG: 5 TABLET ORAL at 05:34

## 2023-07-10 RX ADMIN — PANTOPRAZOLE SODIUM 40 MG: 40 TABLET, DELAYED RELEASE ORAL at 05:36

## 2023-07-10 RX ADMIN — ACETAMINOPHEN 650 MG: 325 TABLET ORAL at 05:34

## 2023-07-10 RX ADMIN — INDOMETHACIN 50 MG: 25 CAPSULE ORAL at 16:18

## 2023-07-10 RX ADMIN — OXYCODONE HYDROCHLORIDE 5 MG: 5 TABLET ORAL at 14:38

## 2023-07-10 RX ADMIN — POLYETHYLENE GLYCOL 3350 17 G: 17 POWDER, FOR SOLUTION ORAL at 08:00

## 2023-07-10 RX ADMIN — ACETAMINOPHEN 650 MG: 325 TABLET ORAL at 14:37

## 2023-07-10 RX ADMIN — HYDROXYZINE HYDROCHLORIDE 25 MG: 25 TABLET, FILM COATED ORAL at 01:43

## 2023-07-10 RX ADMIN — ACETAMINOPHEN 650 MG: 325 TABLET ORAL at 09:55

## 2023-07-10 RX ADMIN — SENNOSIDES AND DOCUSATE SODIUM 1 TABLET: 50; 8.6 TABLET ORAL at 08:02

## 2023-07-10 RX ADMIN — HYDROXYZINE HYDROCHLORIDE 25 MG: 25 TABLET, FILM COATED ORAL at 08:02

## 2023-07-10 RX ADMIN — Medication 81 MG: at 08:02

## 2023-07-10 RX ADMIN — DEXTROSE MONOHYDRATE 900 MG: 50 INJECTION, SOLUTION INTRAVENOUS at 05:23

## 2023-07-10 RX ADMIN — FEBUXOSTAT 40 MG: 40 TABLET, FILM COATED ORAL at 08:01

## 2023-07-10 RX ADMIN — OXYCODONE HYDROCHLORIDE 5 MG: 5 TABLET ORAL at 01:43

## 2023-07-10 RX ADMIN — COLCHICINE 0.6 MG: 0.6 TABLET ORAL at 08:02

## 2023-07-10 ASSESSMENT — ACTIVITIES OF DAILY LIVING (ADL)
ADLS_ACUITY_SCORE: 24

## 2023-07-10 NOTE — PROGRESS NOTES
CLINICAL NUTRITION SERVICES    Reviewed nutrition risk factors due to LOS. Pt is tolerating diet, eating well per nursing documentation. No nutrition issues identified at this time. RD will follow peripherally at this time, unless consulted.

## 2023-07-10 NOTE — PROGRESS NOTES
[facility]    Met with patient and his wife at bedside for teach of IV abx via SL valved PICC.  Plan is for patient to discharge on Ceftriaxone 2g q 24 hours.  Pt will dose at home tonight around 9 pm.    Provided hands-on teaching using teaching mats and demo equipment.  Patient taught SAS method and pt's wife was able to provide return demonstration using aseptic technique.  Extension tubing was added to pt's PICC.  IV catheter flushed without resistance and had good blood return.  Delivery of med and supplies will be delivered to patient's home tonight between 6-9 pm.  A nurse  will contact patient to set up the first home nursing visit.    Provided 24/7 phone number and answered all questions.  Patient verbalized understanding of discharge plans. Updated Natacha Hughes RN CM and pt's bedside RN.    Thank you for the opportunity to provide infusion services to this patient.    Kaylah Acosta, RN, BSN  [facility]  [phone] (Monday through Friday, 8am-5pm)  [phone] (office)

## 2023-07-10 NOTE — PROCEDURES
"PICC Line Insertion Procedure Note  Pt. Name: Tyler Miller  MRN:        [id number]    Procedure: Insertion of a  single Lumen  4 fr  Bard SOLO (valved) Power PICC, Lot number [id number]    Indications: Antibiotic    Contraindications : none    Procedure Details     Patient identified with 2 identifiers and \"Time Out\" conducted.  .     Central line insertion bundle followed: hand hygiene performed prior to procedure, site cleansed with cholraprep, hat, mask, sterile gloves, sterile gown worn, patient draped with maximum barrier head to toe drape, sterile field maintained.    The vein was assessed and found to be compressible and of adequate size.     Lidocaine 1% 2 ml administered sq to the insertion site. A 4 Fr PICC was inserted into the basilic vein of the right arm with ultrasound guidance. 1 attempt(s) required to access vein.   Catheter threaded without difficulty. Good blood return noted.    Modified Seldinger Technique used for insertion.    The 8 sharps that are included in the PICC insertion kit were accounted for and disposed of in the sharps container prior to breakdown of the sterile field.    Catheter secured with Statlock, biopatch and Tegaderm dressing applied.    Findings:    Total catheter length  38 cm, with 0 cm exposed. Mid upper arm circumference is 27 cm. Catheter was flushed with 10 cc NS. Patient  tolerated procedure well.    Tip placement verified by [employer] technology . Tip placement in the SVC.    CLABSI prevention brochure left at bedside.    Patient's primary RN notified PICC is ready for use.      Comments:        Mary MENDEZ,RN,VA-BC  Vascular Access - [facility]        "

## 2023-07-10 NOTE — PLAN OF CARE
Goal Outcome Evaluation:  Medication list and discharge instructions reviewed and [patient verbalized understanding. Encouraged to f/u with PCP and Ortho as ordered. discharge meds handed to patient at discharge.

## 2023-07-10 NOTE — PROGRESS NOTES
"Orthopedic Progress Note      Assessment: 5 Days Post-Op  S/P Procedure(s):  IRRIGATION AND DEBRIDEMENT, RIGHT THUMB, RIGHT SMALL FINGER     Plan:   -Continue PT/OT  - Weightbearing status: WBAT LLE  - Activity: Up with assist and assistive device until independent.  - Anticoagulation: ASA 81 PO BID in addition to SCDs, braden stockings and early ambulation.  - Antibiotics: Per ID.  Appreciate recs  - Pain Management; transition from IV to PO as tolerated  - Diet: progress diet as tolerated  - Labs: stable  - Dressing: Keep dry and intact  - Right hand warm soapy water soaks three times daily.  - Elevation: Elevate LLE on pillow to keep above the level of the heart as much as possible  - Disposition: Plan to discharge home with home cares with PICC line.  Okay to discharge from ortho standpoint      Subjective:  Pain: severe  Nausea, Vomiting:  No  Lightheadedness, Dizziness:  No  Neuro:  Patient denies new onset numbness or paresthesias  Fever, chills: No  Chest pain: No  SOB: No    Patient explains via translation pain is great today.  He has had a lot of improvement in pain of both ankle and hand.  No new concerns    Objective:  BP (!) 140/98 (BP Location: Right arm, Patient Position: Supine)   Pulse 78   Temp 98.5  F (36.9  C) (Oral)   Resp 18   Ht 1.626 m (5' 4\")   Wt 77.7 kg (171 lb 4.8 oz)   SpO2 99%   BMI 29.40 kg/m    The patient is A&Ox3. Appears comfortable.   Sensation is intact.  Dorsiflexion and plantar appreciated but limited. Able to wiggle toes.   Dorsalis pedis not palpable. Brisk capillary refill.   Calves are soft and non-tender. Negative Krista's.  The incision is covered. Dressing anterior/lateral arthroscopic incision producing purulent/gouty fluid with expression of surrounding tissue. Dressing replaced. Other left ankle incisions and right hand incisions C/D/I. Right hand with palpable radial pulse, NV intact.         Pertinent Labs   Lab Results: personally reviewed.   No results found " for: INR, PROTIME  Lab Results   Component Value Date    WBC 13.0 (H) 07/10/2023    HGB 9.1 (L) 07/10/2023    HCT 30.1 (L) 07/10/2023    MCV 68 (L) 07/10/2023     (H) 07/10/2023     Lab Results   Component Value Date     (L) 07/10/2023    CO2 20 (L) 07/10/2023         Report completed by:  Rashid Camargo PA-C, JUAN PABLO  Date: 7/10/2023  Time: 11:16 AM

## 2023-07-10 NOTE — PLAN OF CARE
Goal Outcome Evaluation:  Patient continues to c/o pain to LLE. Scheduled and PRN Medication given as ordered. Right hand soaked per order. Patient to discharge home with 6 weeks course of IV antibiotics. PICC line placed.     Problem: Infection  Goal: Absence of Infection Signs and Symptoms  Outcome: Progressing       Problem: Wound Healing Progression  Goal: Optimal Wound Healing  Outcome: Progressing  Intervention: Promote Wound Healing  Recent Flowsheet Documentation  Taken 7/10/2023 0801 by Biju Gillis RN  Pain Management Interventions: medication (see MAR)  Activity Management: activity adjusted per tolerance     Problem: Sepsis/Septic Shock  Goal: Optimal Nutrition Intake  Outcome: Progressing

## 2023-07-10 NOTE — PLAN OF CARE
Occupational Therapy Discharge Summary    Reason for therapy discharge:    Discharged to home.    Progress towards therapy goal(s). See goals on Care Plan in [facility] electronic health record for goal details.  Goals partially met.  Barriers to achieving goals:   discharge from facility.    Therapy recommendation(s):    No further therapy is recommended.Pt is returning home w/family support.

## 2023-07-10 NOTE — DISCHARGE SUMMARY
"[facility]  Hospitalist Discharge Summary      Date of Admission:  7/3/2023  Date of Discharge:  7/10/2023  Discharging Provider: Phill Mullins DO  Discharge Service: Hospitalist Service    Discharge Diagnoses   Septic arthritis  Strep pyogenes bacteremia  Severe tophaceous gout, acute on chronic  Hyponatremia  Anemia of chronic disease    Clinically Significant Risk Factors     # Overweight: Estimated body mass index is 29.4 kg/m  as calculated from the following:    Height as of this encounter: 1.626 m (5' 4\").    Weight as of this encounter: 77.7 kg (171 lb 4.8 oz).       Follow-ups Needed After Discharge   Follow-up Appointments     Follow Up Care      Please follow-up with Dr. Gonzalez's team (ankle) and Dr. Park's team (hand)   in 1 week at [facility]. Call our scheduling line at [phone]   to make an appointment, if you do not already have one scheduled.        Follow-up and recommended labs and tests       Follow up with primary care provider, Breanna Carlson, within 7 days for   hospital follow- up.  The following labs/tests are recommended: Weekly   BMP, CBC.            Unresulted Labs Ordered in the Past 30 Days of this Admission     Date and Time Order Name Status Description    7/6/2023  2:35 PM Blood Culture Arm, Left Preliminary     7/6/2023  2:35 PM Blood Culture Arm, Right Preliminary     7/5/2023  5:06 PM Blood Culture Arm, Left Preliminary     7/5/2023  5:06 PM Blood Culture Arm, Left Preliminary     7/5/2023  8:23 AM Fungal or Yeast Culture Routine Preliminary     7/5/2023  8:23 AM Anaerobic Bacterial Culture Routine Preliminary     7/5/2023  8:09 AM Anaerobic Bacterial Culture Routine Preliminary     7/5/2023  8:01 AM Anaerobic Bacterial Culture Routine Preliminary     7/4/2023 11:36 AM Anaerobic Bacterial Culture Routine Preliminary       These results will be followed up by [facility] [place]    Discharge Disposition   Discharged to home  Condition at discharge: Stable    Hospital " Course   Patient is a 42 year old male with severe tophaceous gout involving multiple joints admitted for septic arthritis, bacteremia and acute gout flare.    #Septic arthritis  #Strep pyogenes bacteremia  S/p I&D L ankle and R hand 7/5  Ortho following  ID recommends Rocephin x6 weeks  Clindamycin will not continue outpatient  PICC line for home infusions placed 7/10    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  Colchicine  Indomethacin  Uloric    #Hyponatremia, mild  Stable    #Acute on chronic anemia  Monitor    #Elevated blood pressure  Likely due to pain  No antihypertensives needed at this time       DVT ppx: ASA BID      Consultations This Hospital Stay   PHARMACY TO DOSE VANCO  ORTHOPEDIC SURGERY IP CONSULT  ORTHOPEDIC SURGERY IP CONSULT  INFECTIOUS DISEASES IP CONSULT  WOUND OSTOMY CONTINENCE NURSE  IP CONSULT  PODIATRY IP CONSULT  PHYSICAL THERAPY ADULT IP CONSULT  PHYSICAL THERAPY ADULT IP CONSULT  OCCUPATIONAL THERAPY ADULT IP CONSULT  CARE MANAGEMENT / SOCIAL WORK IP CONSULT  VASCULAR ACCESS ADULT IP CONSULT  OCCUPATIONAL THERAPY ADULT IP CONSULT    Code Status   Full Code    Time Spent on this Encounter   IPhill DO, personally saw the patient today and spent greater than 30 minutes discharging this patient.       Phill Mullins DO  [address] [address]  Phone: [phone]  Fax: [phone]  ______________________________________________________________________    Physical Exam   Vital Signs: Temp: 98.6  F (37  C) Temp src: Oral BP: 128/84 Pulse: 82   Resp: 18 SpO2: 98 % O2 Device: None (Room air)    Weight: 171 lbs 4.76 oz  General Appearance:  No acute distress  Respiratory: Clear to auscultation bilaterally  Cardiovascular: Regular rate and rhythm  Musculoskeletal: Tophi present in multiple joints of the hands, elbows and feet. L foot has dressing, L shin proximal to dressing is less erythemic      "  Primary Care Physician   Breanna Carlson    Discharge Orders      Home Infusion Referral      Reason for your hospital stay    S/p hand and ankle I/D     When to call - Contact Surgeon Team    You may experience symptoms that require follow-up before your scheduled appointment. Refer to the \"Stoplight Tool\" for instructions on when to contact your Surgeon Team if you are concerned about pain control, blood clots, constipation, or if you are unable to urinate.     When to call - Reach out to Urgent Care    If you are not able to reach your Surgeon Team and you need immediate care, go to the Orthopedic Walk-in Clinic or Urgent Care at your Surgeon's office.  Do NOT go to the Emergency Room unless you have shortness of breath, chest pain, or other signs of a medical emergency.     When to call - Reasons to Call 911    Call 911 immediately if you experience sudden-onset chest pain, arm weakness/numbness, slurred speech, or shortness of breath     Discharge Instruction - Breathing exercises    Perform breathing exercises using your Incentive Spirometer 10 times per hour while awake for 2 weeks.     Symptoms - Fever Management    A low grade fever can be expected after surgery.  Use acetaminophen (TYLENOL) as needed for fever management.  Contact your Surgeon Team if you have a fever greater than 101.5 F, chills, and/or night sweats.     Symptoms - Constipation management    Constipation (hard, dry bowel movements) is expected after surgery due to the combination of being less active, the anesthetic, and the opioid pain medication.  You can do the following to help reduce constipation:  ~  FLUIDS:  Drink clear liquids (water or Gatorade), or juice (apple/prune).  ~  DIET:  Eat a fiber rich diet.    ~  ACTIVITY:  Get up and move around several times a day.  Increase your activity as you are able.  MEDICATIONS:  Reduce the risk of constipation by starting medications before you are constipated.  You can take Miralax   (1 packet " as directed) and/or a stool softener (Senokot 1-2 tablets 1-2 times a day).  If you already have constipation and these medications are not working, you can get magnesium citrate and use as directed.  If you continue to have constipation you can try an over the counter suppository or enema.  Call your Surgeon Team if it has been greater than 3 days since your last bowel movement.     Symptoms - Reduced Urine Output    Changes in the amount of fluids you drank before and after surgery may result in problems urinating.  It is important to stay well-hydrated after surgery and drink plenty of water. If it has been greater than 8 hours since you have urinated despite drinking plenty of water, call your Surgeon Team.     Activity - Exercises to prevent blood clots    Unless otherwise directed by your Surgeon team, perform the following exercises at least three times per day for the first four weeks after surgery to prevent blood clots in your legs: 1) Point and flex your feet (Ankle Pumps), 2) Move your ankle around in big circles, 3) Wiggle your toes, 4) Walk, even for short distances, several times a day, will help decrease the risk of blood clots.     Comfort and Pain Management - Pain after Surgery    Pain after surgery is normal and expected.  You will have some amount of pain for several weeks after surgery.  Your pain will improve with time.  There are several things you can do to help reduce your pain including: rest, ice, elevation, and using pain medications as needed. Contact your Surgeon Team if you have pain that persists or worsens after surgery despite rest, ice, elevation, and taking your medication(s) as prescribed. Contact your Surgeon Team if you have new numbness, tingling, or weakness in your operative extremity.     Comfort and Pain Management - Swelling after Surgery    Swelling and/or bruising of the surgical extremity is common and may persist for several months after surgery. In addition to  frequent icing and elevation, gentle compressive support with an ACE wrap or tubigrip may help with swelling. Apply compression regularly, removing at least twice daily to perform skin checks. Contact your Surgeon Team if your swelling increases and is NOT associated with an increase in your activity level, or if your swelling increases and is associated with redness and pain.     Comfort and Pain Management - Cold therapy    Ice can be used to control swelling and discomfort after surgery. Place a thin towel over your operative site and apply the ice pack overtop. Leave ice pack in place for 20 minutes, then remove for 20 minutes. Repeat this 20 minutes on/20 minutes off routine as often as tolerated.     Medication Instructions - Acetaminophen (TYLENOL) Instructions    You were discharged with acetaminophen (TYLENOL) for pain management after surgery. Acetaminophen most effectively manages pain symptoms when it is taken on a schedule without missing doses (every four, six, or eight hours). Your Provider will prescribe a safe daily dose between 3000 - 4000 mg.  Do NOT exceed this daily dose. Most patients use acetaminophen for pain control for the first four weeks after surgery.  You can wean from this medication as your pain decreases.     Medication Instructions - Opioids - Tapering Instructions    In the first three days following surgery, your symptoms may warrant use of the narcotic pain medication every four to six hours as prescribed. This is normal. As your pain symptoms improve, focus your efforts on decreasing (tapering) use of narcotic medications. The most successful tapering strategy is to first, decrease the number of tablets you take every 4-6 hours to the minimum prescribed. Then, increase the amount of time between doses.  For example:  First, taper to   or 1 tablet every 4-6 hours.  Then, taper to   or 1 tablet every 6-8 hours.  Then, taper to   or 1 tablet every 8-10 hours.  Then, taper to   or 1  "tablet every 10-12 hours.  Then, taper to   or 1 tablet at bedtime.  The bedtime dose can help with comfort during sleep and is typically the last dose to be discontinued after surgery.     Follow Up Care    Please follow-up with Dr. Gonzalez's team (ankle) and Dr. Park's team (hand) in 1 week at [facility]. Call our scheduling line at [phone] to make an appointment, if you do not already have one scheduled.     Medication instructions -  Anticoagulation - aspirin    Take the aspirin as prescribed for a total of four weeks after surgery.  This is given to help minimize your risk of blood clot.     Comfort and Pain Management - LOWER Extremity Elevation    Swelling is expected for several months after surgery. This type of swelling is usually associated with gravity and activity, and can be improved with elevation.   The best way to do this is to get your \"toes above your nose\" by laying down and placing several pillows lengthwise under your calf and heel. When elevating your leg keep your knee completely straight. Perform this elevation as often as possible especially for the first two weeks after surgery.     Comfort and Pain Management - UPPER extremity Elevation    Swelling is expected for several months after surgery. This type of swelling is usually associated with gravity and activity, and can be improved with elevation.   The best way to do this is to get your hand above your heart by sitting down, resting your elbow on a pillow or arm rest, with your hand in the air. Perform this elevation as often as possible especially for the first two weeks after surgery     Medication Instructions - Opioid Instructions (1 - 2 tablets Q 4-6 hours, MAX 6 tablets)    You were discharged with an opioid medication (hydromorphone, oxycodone, hydrocodone, or tramadol). This medication should only be taken for breakthrough pain that is not controlled with acetaminophen (TYLENOL). If you rate your pain less than 3 you do " not need this medication.  Pain rating 0-3:  You do not need this medication.  Pain rating 4-6:  Take 1 tablet every 4-6 hours as needed  Pain rating 7-10:  Take 2 tablets every 4-6 hours as needed.  Do not exceed 6 tablets per day     Return to Driving    Return to driving - Driving is NOT permitted until directed by your provider. Under no circumstance are you permitted to drive while using narcotic pain medications.     NO Precautions    No precautions directed by your Provider.  You may perform range of motion activities as tolerated.     NO weight bearing    No weight bearing on your operative extremity.     Dressing / Wound Care - Wound    You have a clean dressing on your surgical wound. Dressing change instructions as follows: dressing will be removed at your follow-up appointment. Contact your Surgeon Team if you have increased redness, warmth around the surgical wound, and/or drainage from the surgical wound.     Dressing / Wound care - Shower with wound/dressing covered    You must COVER your dressing or incision with saran wrap (or any other non-permeable covering) to allow the incision to remain dry while showering.  You may shower 2 days after surgery as long as the surgical wound stays dry. Continue to cover your dressing or incision for showering until your first office visit.  You are strictly prohibited from soaking or submerging the surgical wound underwater.     Dressing / Wound Care - NO Tub Bathing    Tub bathing, swimming, or any other activities that will cause your incision to be submerged in water should be avoided until allowed by your Surgeon.     Activity    As tolerated     Weight bearing as tolerated    Weight bearing as tolerated on your operative extremity.     No brace/immobilizer     Return to Driving    Return to driving - Driving is NOT permitted until directed by your provider. Under no circumstance are you permitted to drive while using narcotic pain medications.     NO  Precautions    No precautions directed by your Provider.  You may perform range of motion activities as tolerated.     Dressing / Wound Care - Wound    You have a clean dressing on your surgical wound. Dressing change instructions as follows: dressing will be removed at your follow-up appointment. Contact your Surgeon Team if you have increased redness, warmth around the surgical wound, and/or drainage from the surgical wound.     Dressing / Wound care - Shower with wound/dressing covered    You must COVER your dressing or incision with saran wrap (or any other non-permeable covering) to allow the incision to remain dry while showering.  You may shower 2 days after surgery as long as the surgical wound stays dry. Continue to cover your dressing or incision for showering until your first office visit.  You are strictly prohibited from soaking or submerging the surgical wound underwater.     Dressing / Wound Care - NO Tub Bathing    Tub bathing, swimming, or any other activities that will cause your incision to be submerged in water should be avoided until allowed by your Surgeon.     Activity - Other    As tolerated     Weight bearing as tolerated    Weight bearing as tolerated on your operative extremity.     Follow Up (TCM)    Follow up with Kristyn Black MD, Infectious Disease at the [facility] (near [facility]) in *3-4 weeks (or next available). Phone: [phone]     Reason for your hospital stay    Septic arthritis, bacteremia, acute on chronic tophaceous gout     Follow-up and recommended labs and tests     Follow up with primary care provider, Breanna Carlson, within 7 days for hospital follow- up.  The following labs/tests are recommended: Weekly BMP, CBC.     Activity    Your activity upon discharge: activity as tolerated     Rolling Knee Walker DME    DME Documentation: Describe the reason for need to support medical necessity: Impaired gait due to Foot/Ankle surgery. Anticipated length of  need: 3 months     Walker DME    DME Documentation: Describe the reason for need to support medical necessity: Impaired gait due to Foot/Ankle surgery. Anticipated length of need: 3 months     Discharge Instruction - Regular Diet Adult    Return to your pre-surgery diet unless instructed otherwise     Diet    Follow this diet upon discharge: Orders Placed This Encounter      Advance Diet as Tolerated: Regular Diet Adult      Discharge Instruction - Regular Diet Adult       Significant Results and Procedures   Most Recent 3 CBC's:Recent Labs   Lab Test 07/10/23  0715 07/09/23  0752 07/07/23  0635   WBC 13.0* 14.7* 12.5*   HGB 9.1* 9.4* 9.3*   MCV 68* 67* 69*   * 429 310     Most Recent 3 BMP's:Recent Labs   Lab Test 07/10/23  0715 07/09/23  0752 07/08/23  1141 07/07/23  0833 07/07/23  0635   * 129*  --   --  134*   POTASSIUM 4.7 4.0  --   --  4.1   CHLORIDE 99 96*  --   --  100   CO2 20* 19*  --   --  20*   BUN 18.7 12.3  --   --  11.4   CR 1.15 1.17  --   --  1.03   ANIONGAP 14 14  --   --  14   MANOJ 9.4 9.2  --   --  9.3   GLC 96 92 111*   < > 87    < > = values in this interval not displayed.     Most Recent 2 LFT's:Recent Labs   Lab Test 07/05/23 1804 07/04/23  0623   AST 29 27   ALT 35 52   ALKPHOS 271* 234*   BILITOTAL 1.1 1.3*   ,   Results for orders placed or performed during the hospital encounter of 07/03/23   XR Ankle Left G/E 3 Views    Narrative    EXAM: XR ANKLE LEFT G/E 3 VIEWS  LOCATION: [facility]  DATE: 7/3/2023    INDICATION: left ankle pain, h o gout  COMPARISON: None.      Impression    IMPRESSION: No acute fracture or erosion. No foreign body.   XR Finger Right G/E 2 Views    Narrative    EXAM: XR FINGER RIGHT G/E 2 VIEWS  LOCATION: [facility]  DATE: 7/3/2023    INDICATION: right thumb pain, h o gout  COMPARISON: 03/29/2021      Impression    IMPRESSION: Marked soft tissue swelling of the proximal soft tissues of the thumb with  periarticular osteopenia which likely reflect the sequela of patient's known gout. There is slight subluxation of the distal phalanx of the thumb relative to the   proximal phalanx, though no evidence of a displaced fracture or dislocation.     XR Foot 3 Views Standing Bilateral    Narrative    EXAM: XR FOOT 3 VIEWS STANDING BILATERAL  LOCATION: [facility]  DATE: 7/3/2023    INDICATION: Chronic bilateral foot wounds. Evaluate osteomyelitis or occult pathology. History of gout.  COMPARISON: None.      Impression    IMPRESSION: Marked erosive/destructive changes involving the right second MTP joint with extensive destruction of the second metatarsal head and neck as well as the majority of the proximal middle phalanges most likely secondary to gout with marked   surrounding soft tissue swelling. Mild periarticular erosive changes involving the right third and fourth TMT and third MTP joint.    In the left foot, there are marked periarticular erosive changes involving the MTP joint of the great toe with marked surrounding soft tissue swelling most consistent with gout. Mild periarticular erosive changes involving the second MTP and third TMT   joints.       MR Foot Left w/o & w Contrast    Narrative    EXAM: MR FOOT LEFT W/O and W CONTRAST, MR ANKLE LEFT W/O and W CONTRAST  LOCATION: [facility]  DATE: 7/4/2023    INDICATION: left ankle pain, erosion of 2nd MT and phalanx, RO septic arthritis, gout, vs osteo  COMPARISON: None.  TECHNIQUE: Routine. Additional postgadolinium T1 sequences were obtained.  IV CONTRAST: 8ml Gadavist    FINDINGS:     JOINTS AND BONES:   -Extensive erosive osseous changes are seen in the left foot and ankle most pronounced in the first MTP joint as well as the second metatarsal head with additional erosive foci are seen in the midfoot, second mid and distal phalanx, with large   proliferative erosive changes seen in the posterior subtalar joints  and posterior aspect of the talar. These proliferative erosive soft tissue masses are relatively hypointense on T1 and isointense on T2-weighted heterogeneous enhancement on postcontrast   imaging. The bone marrow adjacent to the erosive masses is not significantly edematous or enhancing.    TENDONS:   -There is mild thickening and increased T2 signal of the distal aspect of the Achilles tendon (image 12, series 12).     LIGAMENTS:   -Lisfranc ligament: Intact. No subluxation.    MUSCLES AND SOFT TISSUES:   -Extensive large soft tissue masses is are seen in throughout the forefoot and hindfoot, which are low on T1 signal and intermediate T2 signal with variable postcontrast enhancement.      Impression    IMPRESSION:  1.  Multifocal extensive large soft tissue masses with underlying osseous erosion seen in the forefoot and hindfoot, most pronounced at the first MTP joint and second metatarsal head as well as in the posterior aspect of the ankle joint, favored reflect   large gouty tophi given the patient's history and enhancement characteristics.  2.  No evidence of drainable soft tissue abscess or definitive evidence of osteomyelitis is seen.  3.  Mild Achilles tendinosis   MR Ankle Left w/o & w Contrast    Narrative    EXAM: MR FOOT LEFT W/O and W CONTRAST, MR ANKLE LEFT W/O and W CONTRAST  LOCATION: [facility]  DATE: 7/4/2023    INDICATION: left ankle pain, erosion of 2nd MT and phalanx, RO septic arthritis, gout, vs osteo  COMPARISON: None.  TECHNIQUE: Routine. Additional postgadolinium T1 sequences were obtained.  IV CONTRAST: 8ml Gadavist    FINDINGS:     JOINTS AND BONES:   -Extensive erosive osseous changes are seen in the left foot and ankle most pronounced in the first MTP joint as well as the second metatarsal head with additional erosive foci are seen in the midfoot, second mid and distal phalanx, with large   proliferative erosive changes seen in the posterior subtalar joints  and posterior aspect of the talar. These proliferative erosive soft tissue masses are relatively hypointense on T1 and isointense on T2-weighted heterogeneous enhancement on postcontrast   imaging. The bone marrow adjacent to the erosive masses is not significantly edematous or enhancing.    TENDONS:   -There is mild thickening and increased T2 signal of the distal aspect of the Achilles tendon (image 12, series 12).     LIGAMENTS:   -Lisfranc ligament: Intact. No subluxation.    MUSCLES AND SOFT TISSUES:   -Extensive large soft tissue masses is are seen in throughout the forefoot and hindfoot, which are low on T1 signal and intermediate T2 signal with variable postcontrast enhancement.      Impression    IMPRESSION:  1.  Multifocal extensive large soft tissue masses with underlying osseous erosion seen in the forefoot and hindfoot, most pronounced at the first MTP joint and second metatarsal head as well as in the posterior aspect of the ankle joint, favored reflect   large gouty tophi given the patient's history and enhancement characteristics.  2.  No evidence of drainable soft tissue abscess or definitive evidence of osteomyelitis is seen.  3.  Mild Achilles tendinosis   US Joint Injection Aspiration Major Left    Narrative    EXAM:  1. PUNCTURE AND ASPIRATION LEFT ANKLE JOINT  2. ULTRASOUND GUIDANCE  LOCATION: [facility]  DATE: 7/4/2023    INDICATION: left ankle  known gout, also Strep bacteremia, evaluate for septic arthritis.    PROCEDURE: Informed consent obtained. Time out performed. The site was prepped and draped in sterile fashion. 10 mL of 1% lidocaine was infused into the local soft tissues. Under direct ultrasound guidance, a 22 gauge needle was placed into the fluid   pocket and 5 ml of brown, cloudy fluid was aspirated. This was sent for cultures.       Impression    IMPRESSION:  1.  Status post ultrasound-guided puncture and aspiration of the left ankle joint.     POC US  "Guidance Needle Placement    Narrative    Ultrasound was performed as guidance to an anesthesia procedure.  Click   \"PACS images\" hyperlink below to view any stored images.  For specific   procedure details, view procedure note authored by anesthesia.   MR Ankle Left w/o & w Contrast    Narrative    EXAM: MR ANKLE LEFT WITHOUT AND WITH CONTRAST, MR FOOT LEFT WITHOUT AND WITH CONTRAST  LOCATION: [facility]  DATE: 07/09/2023    INDICATION: Assess for reaccumulation of abscess. Bacteremia and septic arthritis.  COMPARISON: 07/04/2023.  TECHNIQUE: Routine. Additional postgadolinium T1 sequences were obtained.  IV CONTRAST: 8 mL Gadavist.    FINDINGS:   Again seen are extensive tophus formation with erosion in the forefoot and hindfoot compatible with gouty arthropathy. Superimposed infection within the areas of tophus formation is difficult to exclude by imaging as there is considerable surrounding   soft tissue enhancement along the peripheral aspect of the tophus as well as extensive soft tissue edema and inflammation. Tophus formation is seen about the joints as well as within the subcutaneous soft tissues.    There is tophus formation intermixed with complex fluid along the anterolateral aspect of the ankle superficial to the lateral ankle ligaments and fibula as seen on axial image 23. There is patchy bone marrow edema along portions of the talus, and   calcaneus. Patchy bone marrow edema across the TMT joints.    The fluid in the tibiotalar and subtalar joints has decreased, noting there is a small amount of residual fluid in the tibiotalar joint.    There is multifocal tenosynovitis with moderate to severe tenosynovitis along the medial flexor tendons where there is complex fluid.    There is no evidence of a drainable soft tissue abscess or definitive evidence of osteomyelitis. No significant joint effusion within the forefoot or midfoot.    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    There is chronic appearing osteonecrosis of the distal tibia. No definitive evidence of osteomyelitis.    There is considerable dorsal subcutaneous edema and reticulation without a well-defined rim-enhancing dorsal fluid collection. This is presumably inflammatory or infectious.      Impression    IMPRESSION:  1.  Extensive tophus formation and soft tissue inflammation about the forefoot and hindfoot compatible with gouty arthropathy. Superimposed infection within the areas of tophus formation is difficult to exclude by imaging.  2.  The amount of joint fluid in the tibiotalar and subtalar joints has decreased from the prior exam, with a small amount of residual versus recurrent fluid.  3.  Multifocal tenosynovitis, most pronounced along the medial flexor tendons where there is complex tenosynovial fluid.  4.  Along the anterolateral ankle superficial to the lateral ankle ligaments and fibula, there is tophus formation intermixed with superimposed poorly defined defined fluid along the superficial aspect of the tophus. Infection at this site is difficult   to exclude.  5.  No definitive evidence of osteomyelitis, noting there is reactive bone marrow edema primarily in the hindfoot and ankle.  6.   Chronic appearing osteonecrosis of the distal tibia.     MR Foot Left w/o & w Contrast    Narrative    EXAM: MR ANKLE LEFT WITHOUT AND WITH CONTRAST, MR FOOT LEFT WITHOUT AND WITH CONTRAST  LOCATION: [facility]  DATE: 07/09/2023    INDICATION: Assess for reaccumulation of abscess. Bacteremia and septic arthritis.  COMPARISON: 07/04/2023.  TECHNIQUE: Routine. Additional postgadolinium T1 sequences were obtained.  IV CONTRAST: 8 mL Gadavist.    FINDINGS:   Again seen are extensive tophus formation with erosion in the forefoot and hindfoot compatible with gouty arthropathy. Superimposed infection within the areas of tophus formation is difficult  to exclude by imaging as there is considerable surrounding   soft tissue enhancement along the peripheral aspect of the tophus as well as extensive soft tissue edema and inflammation. Tophus formation is seen about the joints as well as within the subcutaneous soft tissues.    There is tophus formation intermixed with complex fluid along the anterolateral aspect of the ankle superficial to the lateral ankle ligaments and fibula as seen on axial image 23. There is patchy bone marrow edema along portions of the talus, and   calcaneus. Patchy bone marrow edema across the TMT joints.    The fluid in the tibiotalar and subtalar joints has decreased, noting there is a small amount of residual fluid in the tibiotalar joint.    There is multifocal tenosynovitis with moderate to severe tenosynovitis along the medial flexor tendons where there is complex fluid.    There is no evidence of a drainable soft tissue abscess or definitive evidence of osteomyelitis. No significant joint effusion within the forefoot or midfoot.    No acute tendon abnormality, with Achilles tendinosis again seen, with intratendinous tophus.    There is chronic appearing osteonecrosis of the distal tibia. No definitive evidence of osteomyelitis.    There is considerable dorsal subcutaneous edema and reticulation without a well-defined rim-enhancing dorsal fluid collection. This is presumably inflammatory or infectious.      Impression    IMPRESSION:  1.  Extensive tophus formation and soft tissue inflammation about the forefoot and hindfoot compatible with gouty arthropathy. Superimposed infection within the areas of tophus formation is difficult to exclude by imaging.  2.  The amount of joint fluid in the tibiotalar and subtalar joints has decreased from the prior exam, with a small amount of residual versus recurrent fluid.  3.  Multifocal tenosynovitis, most pronounced along the medial flexor tendons where there is complex tenosynovial fluid.  4.   Along the anterolateral ankle superficial to the lateral ankle ligaments and fibula, there is tophus formation intermixed with superimposed poorly defined defined fluid along the superficial aspect of the tophus. Infection at this site is difficult   to exclude.  5.  No definitive evidence of osteomyelitis, noting there is reactive bone marrow edema primarily in the hindfoot and ankle.  6.   Chronic appearing osteonecrosis of the distal tibia.           Discharge Medications   Current Discharge Medication List      START taking these medications    Details   acetaminophen (TYLENOL) 325 MG tablet Take 3 tablets (975 mg) by mouth every 8 hours  Qty: 100 tablet, Refills: 0    Associated Diagnoses: Septic arthritis of left ankle, due to unspecified organism (H)      aspirin 81 MG EC tablet Take 1 tablet (81 mg) by mouth 2 times daily  Qty: 56 tablet, Refills: 0    Associated Diagnoses: Septic arthritis of left ankle, due to unspecified organism (H)      cefTRIAXone (ROCEPHIN) 2 GM vial Inject 2 g into the vein every 24 hours for 38 days  Qty: 760 mL, Refills: 0    Comments: Weekly labs: CBC with diff, CMP, ESR, CRP. Fax to Kristyn Black MD at [phone] Infectious Disease. PICC line cares.  Associated Diagnoses: Septic arthritis of left ankle, due to unspecified organism (H)      hydrOXYzine (ATARAX) 25 MG tablet Take 1 tablet (25 mg) by mouth every 6 hours as needed for other (adjuvant pain)  Qty: 30 tablet, Refills: 0    Associated Diagnoses: Septic arthritis of left ankle, due to unspecified organism (H)      indomethacin (INDOCIN) 50 MG capsule Take 1 capsule (50 mg) by mouth 2 times daily (with meals)  Qty: 60 capsule, Refills: 0    Associated Diagnoses: Chronic gouty arthritis; Chronic tophaceous gout      oxyCODONE (ROXICODONE) 5 MG tablet Take 1-2 tablets (5-10 mg) by mouth every 4 hours as needed for moderate pain  Qty: 26 tablet, Refills: 0    Associated Diagnoses: Septic arthritis of left ankle, due to  "unspecified organism (H)      senna-docusate (SENOKOT-S/PERICOLACE) 8.6-50 MG tablet Take 2 tablets by mouth 2 times daily for 7 days  Qty: 28 tablet, Refills: 0    Associated Diagnoses: Septic arthritis of left ankle, due to unspecified organism (H)         CONTINUE these medications which have CHANGED    Details   colchicine (COLCRYS) 0.6 MG tablet Take 1 tablet (0.6 mg) by mouth 2 times daily  Qty: 60 tablet, Refills: 3    Associated Diagnoses: Chronic gouty arthritis; Chronic tophaceous gout; Elevated blood uric acid level         CONTINUE these medications which have NOT CHANGED    Details   febuxostat (ULORIC) 40 MG TABS tablet Take 40 mg by mouth daily           Allergies   Allergies   Allergen Reactions     Allopurinol      \"Itching throughout body\"     "

## 2023-07-10 NOTE — PROGRESS NOTES
Care Management Follow Up    Length of Stay (days): 7    Expected Discharge Date: 07/10/2023     Concerns to be Addressed:   discharge planning     Patient plan of care discussed at interdisciplinary rounds: Yes    Anticipated Discharge Disposition:       Anticipated Discharge Services:    Education Provided on the Discharge Plan:  Per Care Team   Patient/Family in Agreement with the Plan:  Yes    Referrals Placed by CM/SW:    Private pay costs discussed: Not applicable    Additional Information:  Chart reviewed.    Cm updates:  RNCM reached out to pts wife See. She will be doing IV abx administration and willing to learn. Pt declined home PT at this time.     Pt getting PICC placed today.    Kaylah [facility] liaison stated she can do teach around 3:00-3:15pm with pt and wife.       RNCM updated provider she is fine with pt discharging today.       RNCM updated bedside, and Kaylah.       Social Hx:  Pt home with family; indpt at baseline. Pt lives with wife, and 2 small children. Pt drives. Family to transport.    Cm will continue to follow plan of care, review recommendations,and assist with any discharge needs anticipated.       Natacha Hughes RN      Care Management Discharge Note    Discharge Date: 07/10/2023       Discharge Disposition: Home Infusion    Discharge Transportation: family or friend will provide      Education Provided on the Discharge Plan:  Per Care Team   Persons Notified of Discharge Plans: Yes  Patient/Family in Agreement with the Plan:      Handoff Referral Completed: Yes    Additional Information:  Final discharge plan is for pt to go home with IV abx through [facility]. Bedside, pt, provider, and home infusion notified. Kaylah from [facility] will be in around 3-3:15pm to do pts teach. Pt will be free to go after that.       Natacha Hughes RN

## 2023-07-10 NOTE — PLAN OF CARE
"  Problem: Plan of Care - These are the overarching goals to be used throughout the patient stay.    Goal: Plan of Care Review  Description: The Plan of Care Review/Shift note should be completed every shift.  The Outcome Evaluation is a brief statement about your assessment that the patient is improving, declining, or no change.  This information will be displayed automatically on your shift note.  Outcome: Progressing  Goal: Patient-Specific Goal (Individualized)  Description: You can add care plan individualizations to a care plan. Examples of Individualization might be:  \"Parent requests to be called daily at 9am for status\", \"I have a hard time hearing out of my right ear\", or \"Do not touch me to wake me up as it startles me\".  Outcome: Progressing  Goal: Readiness for Transition of Care  Outcome: Progressing   Goal Outcome Evaluation:  One dose of IV dilaudid given for complaint of severe pain. Oxy and Tylenol used overnight for pain management. Reported a 8 -9/10 pain. Antibiotics ongoing. Afebrile this shift                      "

## 2023-07-11 ENCOUNTER — LAB REQUISITION (OUTPATIENT)
Dept: LAB | Facility: CLINIC | Age: 43
End: 2023-07-11
Payer: COMMERCIAL

## 2023-07-11 DIAGNOSIS — L03.011 CELLULITIS OF RIGHT FINGER: ICD-10-CM

## 2023-07-11 LAB
BACTERIA BLD CULT: NO GROWTH
BACTERIA BLD CULT: NO GROWTH
BASOPHILS # BLD AUTO: 0 10E3/UL (ref 0–0.2)
BASOPHILS NFR BLD AUTO: 0 %
BUN SERPL-MCNC: 25.4 MG/DL (ref 6–20)
CREAT SERPL-MCNC: 1.37 MG/DL (ref 0.67–1.17)
CRP SERPL-MCNC: 53.48 MG/L
EOSINOPHIL # BLD AUTO: 0.3 10E3/UL (ref 0–0.7)
EOSINOPHIL NFR BLD AUTO: 2 %
ERYTHROCYTE [DISTWIDTH] IN BLOOD BY AUTOMATED COUNT: 18.4 % (ref 10–15)
ERYTHROCYTE [SEDIMENTATION RATE] IN BLOOD BY WESTERGREN METHOD: 137 MM/HR (ref 0–15)
GFR SERPL CREATININE-BSD FRML MDRD: 67 ML/MIN/1.73M2
HCT VFR BLD AUTO: 30.6 % (ref 40–53)
HGB BLD-MCNC: 9.5 G/DL (ref 13.3–17.7)
IMM GRANULOCYTES # BLD: 0.2 10E3/UL
IMM GRANULOCYTES NFR BLD: 1 %
LYMPHOCYTES # BLD AUTO: 2.4 10E3/UL (ref 0.8–5.3)
LYMPHOCYTES NFR BLD AUTO: 20 %
MCH RBC QN AUTO: 21.2 PG (ref 26.5–33)
MCHC RBC AUTO-ENTMCNC: 31 G/DL (ref 31.5–36.5)
MCV RBC AUTO: 68 FL (ref 78–100)
MONOCYTES # BLD AUTO: 1.1 10E3/UL (ref 0–1.3)
MONOCYTES NFR BLD AUTO: 9 %
NEUTROPHILS # BLD AUTO: 8.1 10E3/UL (ref 1.6–8.3)
NEUTROPHILS NFR BLD AUTO: 68 %
NRBC # BLD AUTO: 0 10E3/UL
NRBC BLD AUTO-RTO: 0 /100
PLATELET # BLD AUTO: 564 10E3/UL (ref 150–450)
RBC # BLD AUTO: 4.49 10E6/UL (ref 4.4–5.9)
WBC # BLD AUTO: 12.2 10E3/UL (ref 4–11)

## 2023-07-11 PROCEDURE — 82565 ASSAY OF CREATININE: CPT | Performed by: FAMILY MEDICINE

## 2023-07-11 PROCEDURE — 85652 RBC SED RATE AUTOMATED: CPT | Performed by: INTERNAL MEDICINE

## 2023-07-11 PROCEDURE — 84520 ASSAY OF UREA NITROGEN: CPT | Performed by: FAMILY MEDICINE

## 2023-07-11 PROCEDURE — 82565 ASSAY OF CREATININE: CPT | Performed by: INTERNAL MEDICINE

## 2023-07-11 PROCEDURE — 84520 ASSAY OF UREA NITROGEN: CPT | Performed by: INTERNAL MEDICINE

## 2023-07-11 PROCEDURE — 85025 COMPLETE CBC W/AUTO DIFF WBC: CPT | Performed by: INTERNAL MEDICINE

## 2023-07-11 PROCEDURE — 86140 C-REACTIVE PROTEIN: CPT | Performed by: FAMILY MEDICINE

## 2023-07-11 PROCEDURE — 85652 RBC SED RATE AUTOMATED: CPT | Performed by: FAMILY MEDICINE

## 2023-07-11 PROCEDURE — 85025 COMPLETE CBC W/AUTO DIFF WBC: CPT | Performed by: FAMILY MEDICINE

## 2023-07-11 PROCEDURE — 86140 C-REACTIVE PROTEIN: CPT | Performed by: INTERNAL MEDICINE

## 2023-07-11 NOTE — RESULT ENCOUNTER NOTE
Dr. Barrera no longer works here and did not order these lab tests.  She has never seen this patient.   On chart review I am not able to see who ordered these tests, I believe they were obtained at a home care visit. I am not sure who the managing physician currently is. No hospital follow up visit scheduled.   Please find out who is managing this patient's antibiotics.  Is it Ortho?  Results should be forwarded to whoever is managing his antibiotics outpatient.  He likely needs to schedule hospital follow-up with his PCP.  Megha Valle, DO

## 2023-07-12 LAB
BACTERIA WND CULT: NORMAL

## 2023-07-13 ENCOUNTER — HOSPITAL ENCOUNTER (INPATIENT)
Facility: HOSPITAL | Age: 43
LOS: 4 days | Discharge: HOME | End: 2023-07-17
Attending: EMERGENCY MEDICINE | Admitting: INTERNAL MEDICINE
Payer: COMMERCIAL

## 2023-07-13 ENCOUNTER — TELEPHONE (OUTPATIENT)
Dept: INFECTIOUS DISEASES | Facility: CLINIC | Age: 43
End: 2023-07-13

## 2023-07-13 DIAGNOSIS — T50.905A MEDICATION REACTION, INITIAL ENCOUNTER: ICD-10-CM

## 2023-07-13 DIAGNOSIS — R21 RASH: ICD-10-CM

## 2023-07-13 DIAGNOSIS — L03.011 CELLULITIS OF FINGER OF RIGHT HAND: Primary | ICD-10-CM

## 2023-07-13 DIAGNOSIS — M00.272 STREPTOCOCCAL ARTHRITIS OF LEFT ANKLE (H): ICD-10-CM

## 2023-07-13 DIAGNOSIS — M1A.9XX1 TOPHACEOUS GOUT: ICD-10-CM

## 2023-07-13 DIAGNOSIS — M00.9 SEPTIC ARTHRITIS OF LEFT ANKLE, DUE TO UNSPECIFIED ORGANISM (H): ICD-10-CM

## 2023-07-13 LAB
ALBUMIN SERPL BCG-MCNC: 3.2 G/DL (ref 3.5–5.2)
ALP SERPL-CCNC: 335 U/L (ref 40–129)
ALT SERPL W P-5'-P-CCNC: 35 U/L (ref 0–70)
ANION GAP SERPL CALCULATED.3IONS-SCNC: 16 MMOL/L (ref 7–15)
AST SERPL W P-5'-P-CCNC: 30 U/L (ref 0–45)
BASOPHILS # BLD AUTO: 0 10E3/UL (ref 0–0.2)
BASOPHILS NFR BLD AUTO: 0 %
BILIRUB DIRECT SERPL-MCNC: <0.2 MG/DL (ref 0–0.3)
BILIRUB SERPL-MCNC: 0.6 MG/DL
BUN SERPL-MCNC: 19.8 MG/DL (ref 6–20)
CALCIUM SERPL-MCNC: 9.9 MG/DL (ref 8.6–10)
CHLORIDE SERPL-SCNC: 97 MMOL/L (ref 98–107)
CREAT SERPL-MCNC: 1.22 MG/DL (ref 0.67–1.17)
CRP SERPL-MCNC: 77.9 MG/L
DEPRECATED HCO3 PLAS-SCNC: 16 MMOL/L (ref 22–29)
EOSINOPHIL # BLD AUTO: 0.5 10E3/UL (ref 0–0.7)
EOSINOPHIL NFR BLD AUTO: 3 %
ERYTHROCYTE [DISTWIDTH] IN BLOOD BY AUTOMATED COUNT: 18.6 % (ref 10–15)
ERYTHROCYTE [DISTWIDTH] IN BLOOD BY AUTOMATED COUNT: NORMAL %
GFR SERPL CREATININE-BSD FRML MDRD: 77 ML/MIN/1.73M2
GLUCOSE SERPL-MCNC: 116 MG/DL (ref 70–99)
HCT VFR BLD AUTO: 31.1 % (ref 40–53)
HCT VFR BLD AUTO: NORMAL %
HGB BLD-MCNC: 9.7 G/DL (ref 13.3–17.7)
HGB BLD-MCNC: NORMAL G/DL
HOLD SPECIMEN: NORMAL
IMM GRANULOCYTES # BLD: 0.2 10E3/UL
IMM GRANULOCYTES NFR BLD: 1 %
INR PPP: 1.09 (ref 0.85–1.15)
LYMPHOCYTES # BLD AUTO: 1.9 10E3/UL (ref 0.8–5.3)
LYMPHOCYTES NFR BLD AUTO: 12 %
MCH RBC QN AUTO: 21 PG (ref 26.5–33)
MCH RBC QN AUTO: NORMAL PG
MCHC RBC AUTO-ENTMCNC: 31.2 G/DL (ref 31.5–36.5)
MCHC RBC AUTO-ENTMCNC: NORMAL G/DL
MCV RBC AUTO: 67 FL (ref 78–100)
MCV RBC AUTO: NORMAL FL
MONOCYTES # BLD AUTO: 0.8 10E3/UL (ref 0–1.3)
MONOCYTES NFR BLD AUTO: 5 %
NEUTROPHILS # BLD AUTO: 12 10E3/UL (ref 1.6–8.3)
NEUTROPHILS NFR BLD AUTO: 79 %
NRBC # BLD AUTO: 0 10E3/UL
NRBC BLD AUTO-RTO: 0 /100
PLATELET # BLD AUTO: 553 10E3/UL (ref 150–450)
PLATELET # BLD AUTO: NORMAL 10*3/UL
POTASSIUM SERPL-SCNC: 3.9 MMOL/L (ref 3.4–5.3)
PROT SERPL-MCNC: 9.2 G/DL (ref 6.4–8.3)
RBC # BLD AUTO: 4.62 10E6/UL (ref 4.4–5.9)
RBC # BLD AUTO: NORMAL 10*6/UL
SODIUM SERPL-SCNC: 129 MMOL/L (ref 136–145)
WBC # BLD AUTO: 15.4 10E3/UL (ref 4–11)
WBC # BLD AUTO: NORMAL 10*3/UL

## 2023-07-13 PROCEDURE — 99223 1ST HOSP IP/OBS HIGH 75: CPT | Mod: AI | Performed by: INTERNAL MEDICINE

## 2023-07-13 PROCEDURE — 250N000012 HC RX MED GY IP 250 OP 636 PS 637: Performed by: INTERNAL MEDICINE

## 2023-07-13 PROCEDURE — 99233 SBSQ HOSP IP/OBS HIGH 50: CPT | Performed by: INTERNAL MEDICINE

## 2023-07-13 PROCEDURE — 82248 BILIRUBIN DIRECT: CPT | Performed by: EMERGENCY MEDICINE

## 2023-07-13 PROCEDURE — 99285 EMERGENCY DEPT VISIT HI MDM: CPT

## 2023-07-13 PROCEDURE — 87040 BLOOD CULTURE FOR BACTERIA: CPT | Performed by: EMERGENCY MEDICINE

## 2023-07-13 PROCEDURE — 86140 C-REACTIVE PROTEIN: CPT | Performed by: EMERGENCY MEDICINE

## 2023-07-13 PROCEDURE — 999N000127 HC STATISTIC PERIPHERAL IV START W US GUIDANCE

## 2023-07-13 PROCEDURE — 85610 PROTHROMBIN TIME: CPT | Performed by: EMERGENCY MEDICINE

## 2023-07-13 PROCEDURE — 250N000011 HC RX IP 250 OP 636: Mod: JZ | Performed by: EMERGENCY MEDICINE

## 2023-07-13 PROCEDURE — 258N000003 HC RX IP 258 OP 636: Performed by: INTERNAL MEDICINE

## 2023-07-13 PROCEDURE — 120N000001 HC R&B MED SURG/OB

## 2023-07-13 PROCEDURE — 36415 COLL VENOUS BLD VENIPUNCTURE: CPT | Performed by: EMERGENCY MEDICINE

## 2023-07-13 PROCEDURE — 87075 CULTR BACTERIA EXCEPT BLOOD: CPT | Performed by: INTERNAL MEDICINE

## 2023-07-13 PROCEDURE — 258N000003 HC RX IP 258 OP 636: Performed by: EMERGENCY MEDICINE

## 2023-07-13 PROCEDURE — 999N000285 HC STATISTIC VASC ACCESS LAB DRAW WITH PIV START

## 2023-07-13 PROCEDURE — 82310 ASSAY OF CALCIUM: CPT | Performed by: EMERGENCY MEDICINE

## 2023-07-13 PROCEDURE — 85027 COMPLETE CBC AUTOMATED: CPT | Performed by: EMERGENCY MEDICINE

## 2023-07-13 PROCEDURE — 85025 COMPLETE CBC W/AUTO DIFF WBC: CPT | Performed by: INTERNAL MEDICINE

## 2023-07-13 PROCEDURE — 87070 CULTURE OTHR SPECIMN AEROBIC: CPT | Performed by: INTERNAL MEDICINE

## 2023-07-13 RX ORDER — NALOXONE HYDROCHLORIDE 0.4 MG/ML
0.4 INJECTION, SOLUTION INTRAMUSCULAR; INTRAVENOUS; SUBCUTANEOUS
Status: DISCONTINUED | OUTPATIENT
Start: 2023-07-13 | End: 2023-07-17 | Stop reason: HOSPADM

## 2023-07-13 RX ORDER — LIDOCAINE 40 MG/G
CREAM TOPICAL
Status: DISCONTINUED | OUTPATIENT
Start: 2023-07-13 | End: 2023-07-17 | Stop reason: HOSPADM

## 2023-07-13 RX ORDER — HYDROXYZINE HYDROCHLORIDE 50 MG/1
50 TABLET, FILM COATED ORAL EVERY 6 HOURS PRN
Status: DISCONTINUED | OUTPATIENT
Start: 2023-07-13 | End: 2023-07-17 | Stop reason: HOSPADM

## 2023-07-13 RX ORDER — ACETAMINOPHEN 325 MG/1
650 TABLET ORAL EVERY 8 HOURS PRN
COMMUNITY

## 2023-07-13 RX ORDER — ACETAMINOPHEN 650 MG/1
650 SUPPOSITORY RECTAL EVERY 6 HOURS PRN
Status: DISCONTINUED | OUTPATIENT
Start: 2023-07-13 | End: 2023-07-17 | Stop reason: HOSPADM

## 2023-07-13 RX ORDER — NALOXONE HYDROCHLORIDE 0.4 MG/ML
0.2 INJECTION, SOLUTION INTRAMUSCULAR; INTRAVENOUS; SUBCUTANEOUS
Status: DISCONTINUED | OUTPATIENT
Start: 2023-07-13 | End: 2023-07-17 | Stop reason: HOSPADM

## 2023-07-13 RX ORDER — FENTANYL CITRATE 50 UG/ML
25-100 INJECTION, SOLUTION INTRAMUSCULAR; INTRAVENOUS
Status: CANCELLED | OUTPATIENT
Start: 2023-07-13

## 2023-07-13 RX ORDER — HYDROXYZINE HYDROCHLORIDE 25 MG/1
25 TABLET, FILM COATED ORAL EVERY 6 HOURS PRN
Status: DISCONTINUED | OUTPATIENT
Start: 2023-07-13 | End: 2023-07-17 | Stop reason: HOSPADM

## 2023-07-13 RX ORDER — CLINDAMYCIN PHOSPHATE 600 MG/50ML
600 INJECTION, SOLUTION INTRAVENOUS EVERY 8 HOURS
Status: DISCONTINUED | OUTPATIENT
Start: 2023-07-14 | End: 2023-07-15

## 2023-07-13 RX ORDER — HYDROMORPHONE HCL IN WATER/PF 6 MG/30 ML
0.2 PATIENT CONTROLLED ANALGESIA SYRINGE INTRAVENOUS EVERY 4 HOURS PRN
Status: DISCONTINUED | OUTPATIENT
Start: 2023-07-13 | End: 2023-07-17 | Stop reason: HOSPADM

## 2023-07-13 RX ORDER — SODIUM CHLORIDE 9 MG/ML
INJECTION, SOLUTION INTRAVENOUS CONTINUOUS
Status: DISCONTINUED | OUTPATIENT
Start: 2023-07-13 | End: 2023-07-17 | Stop reason: HOSPADM

## 2023-07-13 RX ORDER — SODIUM CHLORIDE, SODIUM LACTATE, POTASSIUM CHLORIDE, CALCIUM CHLORIDE 600; 310; 30; 20 MG/100ML; MG/100ML; MG/100ML; MG/100ML
INJECTION, SOLUTION INTRAVENOUS CONTINUOUS
Status: CANCELLED | OUTPATIENT
Start: 2023-07-13

## 2023-07-13 RX ORDER — LIDOCAINE 40 MG/G
CREAM TOPICAL
Status: CANCELLED | OUTPATIENT
Start: 2023-07-13

## 2023-07-13 RX ORDER — ACETAMINOPHEN 325 MG/1
650 TABLET ORAL EVERY 6 HOURS PRN
Status: DISCONTINUED | OUTPATIENT
Start: 2023-07-13 | End: 2023-07-17 | Stop reason: HOSPADM

## 2023-07-13 RX ORDER — ONDANSETRON 2 MG/ML
4 INJECTION INTRAMUSCULAR; INTRAVENOUS EVERY 6 HOURS PRN
Status: DISCONTINUED | OUTPATIENT
Start: 2023-07-13 | End: 2023-07-17 | Stop reason: HOSPADM

## 2023-07-13 RX ORDER — ONDANSETRON 4 MG/1
4 TABLET, ORALLY DISINTEGRATING ORAL EVERY 6 HOURS PRN
Status: DISCONTINUED | OUTPATIENT
Start: 2023-07-13 | End: 2023-07-17 | Stop reason: HOSPADM

## 2023-07-13 RX ORDER — CEFAZOLIN SODIUM 1 G/50ML
1750 SOLUTION INTRAVENOUS ONCE
Status: COMPLETED | OUTPATIENT
Start: 2023-07-13 | End: 2023-07-13

## 2023-07-13 RX ORDER — CLINDAMYCIN PHOSPHATE 600 MG/50ML
600 INJECTION, SOLUTION INTRAVENOUS ONCE
Status: COMPLETED | OUTPATIENT
Start: 2023-07-13 | End: 2023-07-13

## 2023-07-13 RX ADMIN — SODIUM CHLORIDE, PRESERVATIVE FREE: 5 INJECTION INTRAVENOUS at 18:02

## 2023-07-13 RX ADMIN — PREDNISONE 30 MG: 20 TABLET ORAL at 19:29

## 2023-07-13 RX ADMIN — CLINDAMYCIN PHOSPHATE 600 MG: 600 INJECTION, SOLUTION INTRAVENOUS at 18:01

## 2023-07-13 RX ADMIN — VANCOMYCIN HYDROCHLORIDE 1750 MG: 5 INJECTION, POWDER, LYOPHILIZED, FOR SOLUTION INTRAVENOUS at 18:32

## 2023-07-13 ASSESSMENT — ACTIVITIES OF DAILY LIVING (ADL)
ADLS_ACUITY_SCORE: 33
ADLS_ACUITY_SCORE: 35

## 2023-07-13 NOTE — PHARMACY-VANCOMYCIN DOSING SERVICE
Pharmacy Vancomycin Initial Note  Date of Service 2023  Patient's  1980  42 year old, male    Indication: Abscess    Current estimated CrCl = Estimated Creatinine Clearance: 67.6 mL/min (A) (based on SCr of 1.37 mg/dL (H)).    Creatinine for last 3 days  2023: 11:10 AM Creatinine 1.37 mg/dL    Recent Vancomycin Level(s) for last 3 days  No results found for requested labs within last 3 days.      Vancomycin IV Administrations (past 72 hours)                   vancomycin (VANCOCIN) 1,750 mg in sodium chloride 0.9 % 500 mL intermittent infusion (mg) 1,750 mg Given 23 1832                Nephrotoxins and other renal medications (From now, onward)    Start     Dose/Rate Route Frequency Ordered Stop    23 0800  vancomycin (VANCOCIN) 1,500 mg in sodium chloride 0.9 % 250 mL intermittent infusion         1,500 mg  over 90 Minutes Intravenous EVERY 24 HOURS 23 1836      23 1730  vancomycin (VANCOCIN) 1,750 mg in sodium chloride 0.9 % 500 mL intermittent infusion         1,750 mg  over 2 Hours Intravenous ONCE 23 1729            Contrast Orders - past 72 hours (72h ago, onward)    None          [employer] Prediction of Planned Initial Vancomycin Regimen  Loading dose: 1750 mg IV at 18:32 on 23  Regimen: 1500 mg IV every 24 hours.  Start time: 06:32 on 2023  Exposure target: AUC24 (range)400-600 mg/L.hr   AUC24,ss: 463 mg/L.hr  Probability of AUC24 > 400: 66 %  Ctrough,ss: 12.6 mg/L  Probability of Ctrough,ss > 20: 17 %  Probability of nephrotoxicity (Lodise YAMILE ): 8 %          Plan:  1. Start vancomycin  1500 mg IV q24h.   2. Vancomycin monitoring method: AUC  3. Vancomycin therapeutic monitoring goal: 400-600 mg*h/L  4. Pharmacy will check vancomycin levels as appropriate in 1-3 Days.    5. Serum creatinine levels will be ordered daily for the first week of therapy and at least twice weekly for subsequent weeks.      [place] Cordero, [facility]

## 2023-07-13 NOTE — CONSULTS
[facility]    Infectious Disease Progress Note    Date of Service : 07/13/2023     Assessment & Plan   Tyler Miller is a 42 year old male who was admitted on 7/13/2023.     ASSESSMENT:  1. Streptococcus pyogenous, group A streptococcus bacteremia, septic arthritis, recent hospitalization and discharged on ceftriaxone  2. Developed rash on 7/11/2023, worsening over the last 24 hours, see photos below  3. 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 Antibiotics: Stopped IV ceftriaxone.  Patient seen in ED.  2. Clindamycin and vancomycin  3. Follow culture results   4. Supportive treatment for rash, prednisone x1  5. Orthopedic surgery consult for lower extremity worsening septic arthritis  6. Monitor CBC, CMP  7. Discussed with patient, patient's wife at bedside, home care nurse  8. ID will follow      Kristyn Black MD  [facility]  [phone]      Interval History   Patient presented with rash started from leg, involving whole body very rapidly including face trunk and back  Has been taking ceftriaxone, recently discharged from the hospital for septic arthritis gout, Streptococcus pyogenous bacteremia    History reviewed. No pertinent past medical history.    Patient Active Problem List   Diagnosis     Benign essential hypertension     Chronic gouty arthritis     Chronic tophaceous gout     Elevated blood uric acid level     Polyarthralgia     Bone erosion     Cellulitis of finger of right hand     Acute idiopathic gout of left ankle     Acute idiopathic gout of right hand     Alcohol use disorder, moderate, in early remission (H)     Major depressive disorder, single episode, severe (H)     Tophaceous gout     Medication reaction, initial encounter     Streptococcal arthritis of left ankle (H)     History reviewed. No pertinent family history.    Social Connections: Not on file       Physical Exam   Temp: 97.9  F (36.6   C) Temp src: Oral BP: (!) 126/97 Pulse: 91   Resp: 19 SpO2: 100 % O2 Device: None (Room air)    Vitals:    07/13/23 1545   Weight: 78 kg (172 lb)     Vital Signs with Ranges  Temp:  [97.9  F (36.6  C)] 97.9  F (36.6  C)  Pulse:  [82-91] 91  Resp:  [19] 19  BP: (126-132)/(91-97) 126/97  SpO2:  [97 %-100 %] 100 %    Constitutional: Awake, no apparent distress  Lungs: normal breathing pattern, no crackles or wheezing  Cardiovascular: Regular rate and rhythm  Abdomen: non distended  Skin: Erythematous confluent rash lower extremity, see photos above  Diffuse erythematous maculopapular rash involving face trunk  Neuro: deconditioned  Psych: able to answer questions    Medications     sodium chloride 100 mL/hr at 07/13/23 1930       [START ON 7/14/2023] clindamycin  600 mg Intravenous Q8H     sodium chloride (PF)  3 mL Intracatheter Q8H     [START ON 7/14/2023] vancomycin  1,500 mg Intravenous Q24H     vancomycin  1,750 mg Intravenous Once       Data   All microbiology laboratory data reviewed.  Recent Labs   Lab Test 07/13/23 1832 07/11/23  1110 07/10/23  0715   WBC 15.4* 12.2* 13.0*   HGB 9.7* 9.5* 9.1*   HCT 31.1* 30.6* 30.1*   MCV 67* 68* 68*   * 564* 524*     Recent Labs   Lab Test 07/13/23 1832 07/11/23  1110 07/10/23  0715   CR 1.22* 1.37* 1.15     Recent Labs   Lab Test 07/11/23  1110   *     No lab results found.    Invalid input(s):     MICROBIOLOGY:    Reviewed    7-Day Micro Results     Collected Updated Procedure Result Status      07/13/2023 1748 07/13/2023 1752 Anaerobic Bacterial Culture Routine [[id number]]   Abscess from Ankle, Left    In process Component Value   No component results               07/13/2023 1748 07/13/2023 1752 Wound Aerobic Bacterial Culture Routine [[id number]]   Wound from Foot, Left    In process Component Value   No component results               07/13/2023 1741 07/13/2023 1752 Blood Culture Peripheral Blood [[id number]]   Peripheral Blood    In process  Component Value   No component results               07/13/2023 1741 07/13/2023 1752 Blood Culture Peripheral Blood [[id number]]   Peripheral Blood    In process Component Value   No component results                      RADIOLOGY:    Reviewed  MR Ankle Left w/o & w Contrast    Result Date: 7/9/2023  EXAM: MR ANKLE LEFT WITHOUT AND WITH CONTRAST, MR FOOT LEFT WITHOUT AND WITH CONTRAST LOCATION: [facility] DATE: 07/09/2023 INDICATION: Assess for reaccumulation of abscess. Bacteremia and septic arthritis. COMPARISON: 07/04/2023. TECHNIQUE: Routine. Additional postgadolinium T1 sequences were obtained. IV CONTRAST: 8 mL Gadavist. FINDINGS: Again seen are extensive tophus formation with erosion in the forefoot and hindfoot compatible with gouty arthropathy. Superimposed infection within the areas of tophus formation is difficult to exclude by imaging as there is considerable surrounding soft tissue enhancement along the peripheral aspect of the tophus as well as extensive soft tissue edema and inflammation. Tophus formation is seen about the joints as well as within the subcutaneous soft tissues. There is tophus formation intermixed with complex fluid along the anterolateral aspect of the ankle superficial to the lateral ankle ligaments and fibula as seen on axial image 23. There is patchy bone marrow edema along portions of the talus, and calcaneus. Patchy bone marrow edema across the TMT joints. The fluid in the tibiotalar and subtalar joints has decreased, noting there is a small amount of residual fluid in the tibiotalar joint. There is multifocal tenosynovitis with moderate to severe tenosynovitis along the medial flexor tendons where there is complex fluid. There is no evidence of a drainable soft tissue abscess or definitive evidence of osteomyelitis. No significant joint effusion within the forefoot or midfoot. No acute tendon abnormality, with Achilles tendinosis again seen, with  intratendinous tophus. There is chronic appearing osteonecrosis of the distal tibia. No definitive evidence of osteomyelitis. There is considerable dorsal subcutaneous edema and reticulation without a well-defined rim-enhancing dorsal fluid collection. This is presumably inflammatory or infectious.     IMPRESSION: 1.  Extensive tophus formation and soft tissue inflammation about the forefoot and hindfoot compatible with gouty arthropathy. Superimposed infection within the areas of tophus formation is difficult to exclude by imaging. 2.  The amount of joint fluid in the tibiotalar and subtalar joints has decreased from the prior exam, with a small amount of residual versus recurrent fluid. 3.  Multifocal tenosynovitis, most pronounced along the medial flexor tendons where there is complex tenosynovial fluid. 4.  Along the anterolateral ankle superficial to the lateral ankle ligaments and fibula, there is tophus formation intermixed with superimposed poorly defined defined fluid along the superficial aspect of the tophus. Infection at this site is difficult to exclude. 5.  No definitive evidence of osteomyelitis, noting there is reactive bone marrow edema primarily in the hindfoot and ankle. 6.   Chronic appearing osteonecrosis of the distal tibia.     MR Foot Left w/o & w Contrast    Result Date: 7/9/2023  EXAM: MR ANKLE LEFT WITHOUT AND WITH CONTRAST, MR FOOT LEFT WITHOUT AND WITH CONTRAST LOCATION: [facility] DATE: 07/09/2023 INDICATION: Assess for reaccumulation of abscess. Bacteremia and septic arthritis. COMPARISON: 07/04/2023. TECHNIQUE: Routine. Additional postgadolinium T1 sequences were obtained. IV CONTRAST: 8 mL Gadavist. FINDINGS: Again seen are extensive tophus formation with erosion in the forefoot and hindfoot compatible with gouty arthropathy. Superimposed infection within the areas of tophus formation is difficult to exclude by imaging as there is considerable surrounding  soft tissue enhancement along the peripheral aspect of the tophus as well as extensive soft tissue edema and inflammation. Tophus formation is seen about the joints as well as within the subcutaneous soft tissues. There is tophus formation intermixed with complex fluid along the anterolateral aspect of the ankle superficial to the lateral ankle ligaments and fibula as seen on axial image 23. There is patchy bone marrow edema along portions of the talus, and calcaneus. Patchy bone marrow edema across the TMT joints. The fluid in the tibiotalar and subtalar joints has decreased, noting there is a small amount of residual fluid in the tibiotalar joint. There is multifocal tenosynovitis with moderate to severe tenosynovitis along the medial flexor tendons where there is complex fluid. There is no evidence of a drainable soft tissue abscess or definitive evidence of osteomyelitis. No significant joint effusion within the forefoot or midfoot. No acute tendon abnormality, with Achilles tendinosis again seen, with intratendinous tophus. There is chronic appearing osteonecrosis of the distal tibia. No definitive evidence of osteomyelitis. There is considerable dorsal subcutaneous edema and reticulation without a well-defined rim-enhancing dorsal fluid collection. This is presumably inflammatory or infectious.     IMPRESSION: 1.  Extensive tophus formation and soft tissue inflammation about the forefoot and hindfoot compatible with gouty arthropathy. Superimposed infection within the areas of tophus formation is difficult to exclude by imaging. 2.  The amount of joint fluid in the tibiotalar and subtalar joints has decreased from the prior exam, with a small amount of residual versus recurrent fluid. 3.  Multifocal tenosynovitis, most pronounced along the medial flexor tendons where there is complex tenosynovial fluid. 4.  Along the anterolateral ankle superficial to the lateral ankle ligaments and fibula, there is tophus  "formation intermixed with superimposed poorly defined defined fluid along the superficial aspect of the tophus. Infection at this site is difficult to exclude. 5.  No definitive evidence of osteomyelitis, noting there is reactive bone marrow edema primarily in the hindfoot and ankle. 6.   Chronic appearing osteonecrosis of the distal tibia.     POC US Guidance Needle Placement    Result Date: 7/5/2023  Ultrasound was performed as guidance to an anesthesia procedure.  Click \"PACS images\" hyperlink below to view any stored images.  For specific procedure details, view procedure note authored by anesthesia.    US Joint Injection Aspiration Major Left    Result Date: 7/4/2023  EXAM: 1. PUNCTURE AND ASPIRATION LEFT ANKLE JOINT 2. ULTRASOUND GUIDANCE LOCATION: [facility] DATE: 7/4/2023 INDICATION: left ankle  known gout, also Strep bacteremia, evaluate for septic arthritis. PROCEDURE: Informed consent obtained. Time out performed. The site was prepped and draped in sterile fashion. 10 mL of 1% lidocaine was infused into the local soft tissues. Under direct ultrasound guidance, a 22 gauge needle was placed into the fluid pocket and 5 ml of brown, cloudy fluid was aspirated. This was sent for cultures.     IMPRESSION: 1.  Status post ultrasound-guided puncture and aspiration of the left ankle joint.     MR Foot Left w/o & w Contrast    Result Date: 7/4/2023  EXAM: MR FOOT LEFT W/O and W CONTRAST, MR ANKLE LEFT W/O and W CONTRAST LOCATION: [facility] DATE: 7/4/2023 INDICATION: left ankle pain, erosion of 2nd MT and phalanx, RO septic arthritis, gout, vs osteo COMPARISON: None. TECHNIQUE: Routine. Additional postgadolinium T1 sequences were obtained. IV CONTRAST: 8ml Gadavist FINDINGS: JOINTS AND BONES: -Extensive erosive osseous changes are seen in the left foot and ankle most pronounced in the first MTP joint as well as the second metatarsal head with additional erosive foci " are seen in the midfoot, second mid and distal phalanx, with large proliferative erosive changes seen in the posterior subtalar joints and posterior aspect of the talar. These proliferative erosive soft tissue masses are relatively hypointense on T1 and isointense on T2-weighted heterogeneous enhancement on postcontrast  imaging. The bone marrow adjacent to the erosive masses is not significantly edematous or enhancing. TENDONS: -There is mild thickening and increased T2 signal of the distal aspect of the Achilles tendon (image 12, series 12). LIGAMENTS: -Lisfranc ligament: Intact. No subluxation. MUSCLES AND SOFT TISSUES: -Extensive large soft tissue masses is are seen in throughout the forefoot and hindfoot, which are low on T1 signal and intermediate T2 signal with variable postcontrast enhancement.     IMPRESSION: 1.  Multifocal extensive large soft tissue masses with underlying osseous erosion seen in the forefoot and hindfoot, most pronounced at the first MTP joint and second metatarsal head as well as in the posterior aspect of the ankle joint, favored reflect large gouty tophi given the patient's history and enhancement characteristics. 2.  No evidence of drainable soft tissue abscess or definitive evidence of osteomyelitis is seen. 3.  Mild Achilles tendinosis    MR Ankle Left w/o & w Contrast    Result Date: 7/4/2023  EXAM: MR FOOT LEFT W/O and W CONTRAST, MR ANKLE LEFT W/O and W CONTRAST LOCATION: [facility] DATE: 7/4/2023 INDICATION: left ankle pain, erosion of 2nd MT and phalanx, RO septic arthritis, gout, vs osteo COMPARISON: None. TECHNIQUE: Routine. Additional postgadolinium T1 sequences were obtained. IV CONTRAST: 8ml Gadavist FINDINGS: JOINTS AND BONES: -Extensive erosive osseous changes are seen in the left foot and ankle most pronounced in the first MTP joint as well as the second metatarsal head with additional erosive foci are seen in the midfoot, second mid and distal  phalanx, with large proliferative erosive changes seen in the posterior subtalar joints and posterior aspect of the talar. These proliferative erosive soft tissue masses are relatively hypointense on T1 and isointense on T2-weighted heterogeneous enhancement on postcontrast  imaging. The bone marrow adjacent to the erosive masses is not significantly edematous or enhancing. TENDONS: -There is mild thickening and increased T2 signal of the distal aspect of the Achilles tendon (image 12, series 12). LIGAMENTS: -Lisfranc ligament: Intact. No subluxation. MUSCLES AND SOFT TISSUES: -Extensive large soft tissue masses is are seen in throughout the forefoot and hindfoot, which are low on T1 signal and intermediate T2 signal with variable postcontrast enhancement.     IMPRESSION: 1.  Multifocal extensive large soft tissue masses with underlying osseous erosion seen in the forefoot and hindfoot, most pronounced at the first MTP joint and second metatarsal head as well as in the posterior aspect of the ankle joint, favored reflect large gouty tophi given the patient's history and enhancement characteristics. 2.  No evidence of drainable soft tissue abscess or definitive evidence of osteomyelitis is seen. 3.  Mild Achilles tendinosis    XR Foot 3 Views Standing Bilateral    Result Date: 7/3/2023  EXAM: XR FOOT 3 VIEWS STANDING BILATERAL LOCATION: [facility] DATE: 7/3/2023 INDICATION: Chronic bilateral foot wounds. Evaluate osteomyelitis or occult pathology. History of gout. COMPARISON: None.     IMPRESSION: Marked erosive/destructive changes involving the right second MTP joint with extensive destruction of the second metatarsal head and neck as well as the majority of the proximal middle phalanges most likely secondary to gout with marked surrounding soft tissue swelling. Mild periarticular erosive changes involving the right third and fourth TMT and third MTP joint. In the left foot, there are marked  periarticular erosive changes involving the MTP joint of the great toe with marked surrounding soft tissue swelling most consistent with gout. Mild periarticular erosive changes involving the second MTP and third TMT joints.     XR Finger Right G/E 2 Views    Result Date: 7/3/2023  EXAM: XR FINGER RIGHT G/E 2 VIEWS LOCATION: [facility] DATE: 7/3/2023 INDICATION: right thumb pain, h o gout COMPARISON: 03/29/2021     IMPRESSION: Marked soft tissue swelling of the proximal soft tissues of the thumb with periarticular osteopenia which likely reflect the sequela of patient's known gout. There is slight subluxation of the distal phalanx of the thumb relative to the proximal phalanx, though no evidence of a displaced fracture or dislocation.     XR Ankle Left G/E 3 Views    Result Date: 7/3/2023  EXAM: XR ANKLE LEFT G/E 3 VIEWS LOCATION: [facility] DATE: 7/3/2023 INDICATION: left ankle pain, h o gout COMPARISON: None.     IMPRESSION: No acute fracture or erosion. No foreign body.

## 2023-07-13 NOTE — H&P
[facility]    History and Physical - Hospitalist Service       Date of Admission:  7/13/2023    Assessment & Plan      Tyler Miller is a 42 year old male admitted on 7/13/2023. He was recently discharged from [facility] after being managed for Septic arthritis, Strep pyogenes bacteremia, S/p I&D L ankle and R hand 7/5, ID recommended Rocephin x6 weeks - 38 days. Patient arrives to triage from home with chief complaint of rash all over his body which started to develop on Tuesday this week. Patient has a PICC line as well, been receiving IV antibiotics at home.  History of gout and cellulitis. Patient's left foot and LLE is very reddened and swollen.  Patient subsequently came to the ER for further evaluation and management. Will hold off iv ceftriaxone and started on iv clindamycin, vancomycin due to concerns for drug allergy, hold off steroids till cleared by ID, consult ID, ortho.   .  .    A/p :         Recently discharged from [facility] after being managed for Septic arthritis, Strep pyogenes bacteremia, S/p I&D L ankle and R hand 7/5, ID recommended Rocephin x6 weeks - 38 days    Skin rash     Tophaceous gout    He was recently discharged from [facility] after being managed for Septic arthritis, Strep pyogenes bacteremia, S/p I&D L ankle and R hand 7/5, ID recommended Rocephin x6 weeks - 38 days. Patient arrives to triage from home with chief complaint of rash all over his body which started to develop on Tuesday this week. Patient has a PICC line as well, been receiving IV antibiotics at home.  History of gout and cellulitis. Patient's left foot and LLE is very reddened and swollen.  Patient subsequently came to the ER for further evaluation and management. Will hold off iv ceftriaxone and started on iv clindamycin, vancomycin due to concerns for drug allergy, hold off steroids till cleared by ID, consult ID, ortho.      #Severe tophaceous gout involving multiple joints with acute  "flare  Colchicine  Indomethacin  Uloric        #chronic anemia  Monitor     # Chronic pain : on oxycodone    # Mild hyponatremia  # DONNA    2/2 dehydration, iv fluids    # Leucocytosis, mild, chronic               Diet:   NPO  DVT Prophylaxis: Pneumatic Compression Devices  Senior Catheter: Not present  Lines: PRESENT             Cardiac Monitoring: None  Code Status:   full    Clinically Significant Risk Factors Present on Admission                # Drug Induced Platelet Defect: home medication list includes an antiplatelet medication   # Hypertension: Noted on problem list      # Overweight: Estimated body mass index is 29.52 kg/m  as calculated from the following:    Height as of this encounter: 1.626 m (5' 4\").    Weight as of this encounter: 78 kg (172 lb).            Disposition Plan      Expected Discharge Date: 07/15/2023                  Mark Jacques MD  Hospitalist Service  [facility]  Securely message with [employer] (more info)  Text page via [facility] Paging/Directory     ______________________________________________________________________    Chief Complaint   Rash     History is obtained from the patient    History of Present Illness     Tyler Miller is a 42 year old male admitted on 7/13/2023. He was recently discharged from [facility] after being managed for Septic arthritis, Strep pyogenes bacteremia, S/p I&D L ankle and R hand 7/5, ID recommended Rocephin x6 weeks - 38 days. Patient arrives to triage from home with chief complaint of rash all over his body which started to develop on Tuesday this week. Patient has a PICC line as well, been receiving IV antibiotics at home.  History of gout and cellulitis. Patient's left foot and LLE is very reddened and swollen.  Patient subsequently came to the ER for further evaluation and management. Will hold off iv ceftriaxone and started on iv clindamycin, vancomycin due to concerns for drug allergy, hold off steroids till cleared by ID, consult " ID, ortho.   .  .          Past Medical History    History reviewed. No pertinent past medical history.    Past Surgical History   Past Surgical History:   Procedure Laterality Date     IRRIGATION AND DEBRIDEMENT UPPER EXTREMITY, COMBINED Right 7/5/2023    Procedure: IRRIGATION AND DEBRIDEMENT, RIGHT THUMB, RIGHT SMALL FINGER;  Surgeon: Jemraine Park MD;  Location: [facility] [place] OR     PICC SINGLE LUMEN PLACEMENT  7/10/2023            Prior to Admission Medications   Prior to Admission Medications   Prescriptions Last Dose Informant Patient Reported? Taking?   acetaminophen (TYLENOL) 325 MG tablet   No No   Sig: Take 3 tablets (975 mg) by mouth every 8 hours   aspirin 81 MG EC tablet   No No   Sig: Take 1 tablet (81 mg) by mouth 2 times daily   cefTRIAXone (ROCEPHIN) 2 GM vial   No No   Sig: Inject 2 g into the vein every 24 hours for 38 days   colchicine (COLCRYS) 0.6 MG tablet   No No   Sig: Take 1 tablet (0.6 mg) by mouth 2 times daily   febuxostat (ULORIC) 40 MG TABS tablet   Yes No   Sig: Take 40 mg by mouth daily   hydrOXYzine (ATARAX) 25 MG tablet   No No   Sig: Take 1 tablet (25 mg) by mouth every 6 hours as needed for other (adjuvant pain)   indomethacin (INDOCIN) 50 MG capsule   No No   Sig: Take 1 capsule (50 mg) by mouth 2 times daily (with meals)   oxyCODONE (ROXICODONE) 5 MG tablet   No No   Sig: Take 1-2 tablets (5-10 mg) by mouth every 4 hours as needed for moderate pain   senna-docusate (SENOKOT-S/PERICOLACE) 8.6-50 MG tablet   No No   Sig: Take 2 tablets by mouth 2 times daily for 7 days      Facility-Administered Medications: None        Review of Systems          No fever  No cp  No abdominal symptoms  No urinary symptoms  No neuro symptoms    Physical Exam   Vital Signs: Temp: 97.9  F (36.6  C) Temp src: Oral BP: (!) 132/91 Pulse: 88   Resp: 19 SpO2: 100 % O2 Device: None (Room air)    Weight: 172 lbs 0 oz       GENERAL: The patient is not in any acute distressed. Awake and alert.  HEENT:  Nonicteric sclerae, PERRLA, EOMI. Oropharynx clear. Moist mucous membranes. Conjunctivae appear well perfused.  HEART: Regular rate and rhythm without murmurs.  LUNGS: Clear to auscultation bilaterally. No wheezing or crackles.  ABDOMEN: Soft, positive bowel sounds, nontender.  SKIN: generalized rash, no excessive bruising, petechiae, or purpura.  EXTREMITIES : no rashes, no swelling in legs.  NEUROLOGIC: conscious and oriented, follows commands, no obvious focal deficits.  ROS: All other systems negative       Medical Decision Making       76 MINUTES SPENT BY ME on the date of service doing chart review, history, exam, documentation & further activities per the note.  MANAGEMENT DISCUSSED with the following over the past 24 hours: rn, patient       Data     I have personally reviewed the following data over the past 24 hrs:    15.4 (H)  \   9.7 (L)   / 553 (H)     129 (L) 97 (L) 19.8 /  116 (H)   3.9 16 (L) 1.22 (H) \       ALT: 35 AST: 30 AP: 335 (H) TBILI: 0.6   ALB: 3.2 (L) TOT PROTEIN: 9.2 (H) LIPASE: N/A       Procal: N/A CRP: 77.90 (H) Lactic Acid: N/A       INR:  1.09 PTT:  N/A   D-dimer:  N/A Fibrinogen:  N/A

## 2023-07-13 NOTE — ED TRIAGE NOTES
Patient arrives to triage from home with chief complaint of rash all over his body which started to develop on Tuesday this week.  Patient reports recently having surgery on his right foot and was discharged on Monday this week.  Patient has a PICC line as well, been receiving IV antibiotics at home.  History of gout and cellulitis.  Patient's left foot and LLE is very reddened and swollen.  Left foot is very warm to touch.  Patient denies any difficulty with swallowing or difficulty with breathing. Patient is alert and oriented x4.

## 2023-07-13 NOTE — CONSULTS
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  He underwent arthroscopic irrigation and debridement of his left ankle due to septic arthritis about a week ago.  Was discharged on IV antibiotics.  Over the past 48 hours he has developed increasing redness in his left leg and itching and a papular rash on the good portion of his body which is presumed to be a little allergy to the antibiotic.  His ankle has a now started draining from one of the arthroscopic portals.  Difficult to know if it is just tophaceous material versus persistent septic arthritis.  Clinically appears to be a combination of both.  His ankle feels little bit better however he is still markedly swollen throughout that left lower extremity and ankle.  There is redness which extends from the ankle up to his mid shin however it looks to be more of a rash then cellulitic.      PAST MEDICAL HISTORY:   History reviewed. No pertinent past medical history.    ALLERGIES:      Allergies   Allergen Reactions     Allopurinol      \"Itching throughout body\"        MEDICATIONS ON ADMISSION:  Medications were reviewed.  They do include:   (Not in a hospital admission)      SOCIAL HISTORY:   Social History     Tobacco Use     Smoking status: Never   Substance Use Topics     Alcohol use: Not Currently     Drug use: Never         FAMILY HISTORY:  History reviewed. No pertinent family history.    REVIEW OF SYSTEMS:   I have reviewed the review of systems and they are negative except as noted above in HPI.      PHYSICAL EXAMINATION:    Temp:  [97.9  F (36.6  C)] 97.9  F (36.6  C)  Pulse:  [88] 88  Resp:  [19] 19  BP: (132)/(91) 132/91  SpO2: "  [100 %] 100 %    General: On examination, the patient is A&Ox3  Neck: atraumatic, normocephalic. non tender cervical ROM  Chest: no labored respirations  SKIN/HAIR/NAILS: normal. No edema. No open sores or lesions  Pulses:  Dorsalis pedis pulses intact. Good cap refill. Distal extremity warm  Sensation: intact to light touch in bilateral lower extremities  Tenderness: Global around left ankle.  Patient has multiple tophaceous gouty nodules on both feet and toes.  Also around the ankle.  Similar findings on his hands.  His left leg is a deep red however not frankly cellulitic.  Looks to be more of a reaction to potentially his antibiotic.  He has a petechial type rash across a good portion of his body otherwise which is very itchy.  He has 3 arthroscopy portals.  The lateral portal to the ankle is actively draining white what appears to be tophaceous and purulent material.  Crepitus: none  Pain with ROM: Mild left ankle  Instability: None  Motor: Able to plantarflex and dorsiflex both ankles.  Considerable edema throughout the left lower extremity from the below the knee throughout the foot.  Contralateral side= Full range of motion, Negative joint instability findings, 5/5 motor groups about the joint, Non-tender.     RADIOGRAPHIC EVALUATION:  Results read and images reviewed  MR Ankle Left w/o & w Contrast    Result Date: 7/9/2023  EXAM: MR ANKLE LEFT WITHOUT AND WITH CONTRAST, MR FOOT LEFT WITHOUT AND WITH CONTRAST LOCATION: [facility] DATE: 07/09/2023 INDICATION: Assess for reaccumulation of abscess. Bacteremia and septic arthritis. COMPARISON: 07/04/2023. TECHNIQUE: Routine. Additional postgadolinium T1 sequences were obtained. IV CONTRAST: 8 mL Gadavist. FINDINGS: Again seen are extensive tophus formation with erosion in the forefoot and hindfoot compatible with gouty arthropathy. Superimposed infection within the areas of tophus formation is difficult to exclude by imaging as there  is considerable surrounding soft tissue enhancement along the peripheral aspect of the tophus as well as extensive soft tissue edema and inflammation. Tophus formation is seen about the joints as well as within the subcutaneous soft tissues. There is tophus formation intermixed with complex fluid along the anterolateral aspect of the ankle superficial to the lateral ankle ligaments and fibula as seen on axial image 23. There is patchy bone marrow edema along portions of the talus, and calcaneus. Patchy bone marrow edema across the TMT joints. The fluid in the tibiotalar and subtalar joints has decreased, noting there is a small amount of residual fluid in the tibiotalar joint. There is multifocal tenosynovitis with moderate to severe tenosynovitis along the medial flexor tendons where there is complex fluid. There is no evidence of a drainable soft tissue abscess or definitive evidence of osteomyelitis. No significant joint effusion within the forefoot or midfoot. No acute tendon abnormality, with Achilles tendinosis again seen, with intratendinous tophus. There is chronic appearing osteonecrosis of the distal tibia. No definitive evidence of osteomyelitis. There is considerable dorsal subcutaneous edema and reticulation without a well-defined rim-enhancing dorsal fluid collection. This is presumably inflammatory or infectious.     IMPRESSION: 1.  Extensive tophus formation and soft tissue inflammation about the forefoot and hindfoot compatible with gouty arthropathy. Superimposed infection within the areas of tophus formation is difficult to exclude by imaging. 2.  The amount of joint fluid in the tibiotalar and subtalar joints has decreased from the prior exam, with a small amount of residual versus recurrent fluid. 3.  Multifocal tenosynovitis, most pronounced along the medial flexor tendons where there is complex tenosynovial fluid. 4.  Along the anterolateral ankle superficial to the lateral ankle ligaments and  fibula, there is tophus formation intermixed with superimposed poorly defined defined fluid along the superficial aspect of the tophus. Infection at this site is difficult to exclude. 5.  No definitive evidence of osteomyelitis, noting there is reactive bone marrow edema primarily in the hindfoot and ankle. 6.   Chronic appearing osteonecrosis of the distal tibia.     MR Foot Left w/o & w Contrast    Result Date: 7/9/2023  EXAM: MR ANKLE LEFT WITHOUT AND WITH CONTRAST, MR FOOT LEFT WITHOUT AND WITH CONTRAST LOCATION: [facility] DATE: 07/09/2023 INDICATION: Assess for reaccumulation of abscess. Bacteremia and septic arthritis. COMPARISON: 07/04/2023. TECHNIQUE: Routine. Additional postgadolinium T1 sequences were obtained. IV CONTRAST: 8 mL Gadavist. FINDINGS: Again seen are extensive tophus formation with erosion in the forefoot and hindfoot compatible with gouty arthropathy. Superimposed infection within the areas of tophus formation is difficult to exclude by imaging as there is considerable surrounding soft tissue enhancement along the peripheral aspect of the tophus as well as extensive soft tissue edema and inflammation. Tophus formation is seen about the joints as well as within the subcutaneous soft tissues. There is tophus formation intermixed with complex fluid along the anterolateral aspect of the ankle superficial to the lateral ankle ligaments and fibula as seen on axial image 23. There is patchy bone marrow edema along portions of the talus, and calcaneus. Patchy bone marrow edema across the TMT joints. The fluid in the tibiotalar and subtalar joints has decreased, noting there is a small amount of residual fluid in the tibiotalar joint. There is multifocal tenosynovitis with moderate to severe tenosynovitis along the medial flexor tendons where there is complex fluid. There is no evidence of a drainable soft tissue abscess or definitive evidence of osteomyelitis. No  "significant joint effusion within the forefoot or midfoot. No acute tendon abnormality, with Achilles tendinosis again seen, with intratendinous tophus. There is chronic appearing osteonecrosis of the distal tibia. No definitive evidence of osteomyelitis. There is considerable dorsal subcutaneous edema and reticulation without a well-defined rim-enhancing dorsal fluid collection. This is presumably inflammatory or infectious.     IMPRESSION: 1.  Extensive tophus formation and soft tissue inflammation about the forefoot and hindfoot compatible with gouty arthropathy. Superimposed infection within the areas of tophus formation is difficult to exclude by imaging. 2.  The amount of joint fluid in the tibiotalar and subtalar joints has decreased from the prior exam, with a small amount of residual versus recurrent fluid. 3.  Multifocal tenosynovitis, most pronounced along the medial flexor tendons where there is complex tenosynovial fluid. 4.  Along the anterolateral ankle superficial to the lateral ankle ligaments and fibula, there is tophus formation intermixed with superimposed poorly defined defined fluid along the superficial aspect of the tophus. Infection at this site is difficult to exclude. 5.  No definitive evidence of osteomyelitis, noting there is reactive bone marrow edema primarily in the hindfoot and ankle. 6.   Chronic appearing osteonecrosis of the distal tibia.     POC US Guidance Needle Placement    Result Date: 7/5/2023  Ultrasound was performed as guidance to an anesthesia procedure.  Click \"PACS images\" hyperlink below to view any stored images.  For specific procedure details, view procedure note authored by anesthesia.    US Joint Injection Aspiration Major Left    Result Date: 7/4/2023  EXAM: 1. PUNCTURE AND ASPIRATION LEFT ANKLE JOINT 2. ULTRASOUND GUIDANCE LOCATION: [facility] DATE: 7/4/2023 INDICATION: left ankle  known gout, also Strep bacteremia, evaluate for septic " arthritis. PROCEDURE: Informed consent obtained. Time out performed. The site was prepped and draped in sterile fashion. 10 mL of 1% lidocaine was infused into the local soft tissues. Under direct ultrasound guidance, a 22 gauge needle was placed into the fluid pocket and 5 ml of brown, cloudy fluid was aspirated. This was sent for cultures.     IMPRESSION: 1.  Status post ultrasound-guided puncture and aspiration of the left ankle joint.     MR Foot Left w/o & w Contrast    Result Date: 7/4/2023  EXAM: MR FOOT LEFT W/O and W CONTRAST, MR ANKLE LEFT W/O and W CONTRAST LOCATION: [facility] DATE: 7/4/2023 INDICATION: left ankle pain, erosion of 2nd MT and phalanx, RO septic arthritis, gout, vs osteo COMPARISON: None. TECHNIQUE: Routine. Additional postgadolinium T1 sequences were obtained. IV CONTRAST: 8ml Gadavist FINDINGS: JOINTS AND BONES: -Extensive erosive osseous changes are seen in the left foot and ankle most pronounced in the first MTP joint as well as the second metatarsal head with additional erosive foci are seen in the midfoot, second mid and distal phalanx, with large proliferative erosive changes seen in the posterior subtalar joints and posterior aspect of the talar. These proliferative erosive soft tissue masses are relatively hypointense on T1 and isointense on T2-weighted heterogeneous enhancement on postcontrast  imaging. The bone marrow adjacent to the erosive masses is not significantly edematous or enhancing. TENDONS: -There is mild thickening and increased T2 signal of the distal aspect of the Achilles tendon (image 12, series 12). LIGAMENTS: -Lisfranc ligament: Intact. No subluxation. MUSCLES AND SOFT TISSUES: -Extensive large soft tissue masses is are seen in throughout the forefoot and hindfoot, which are low on T1 signal and intermediate T2 signal with variable postcontrast enhancement.     IMPRESSION: 1.  Multifocal extensive large soft tissue masses with  underlying osseous erosion seen in the forefoot and hindfoot, most pronounced at the first MTP joint and second metatarsal head as well as in the posterior aspect of the ankle joint, favored reflect large gouty tophi given the patient's history and enhancement characteristics. 2.  No evidence of drainable soft tissue abscess or definitive evidence of osteomyelitis is seen. 3.  Mild Achilles tendinosis    MR Ankle Left w/o & w Contrast    Result Date: 7/4/2023  EXAM: MR FOOT LEFT W/O and W CONTRAST, MR ANKLE LEFT W/O and W CONTRAST LOCATION: [facility] DATE: 7/4/2023 INDICATION: left ankle pain, erosion of 2nd MT and phalanx, RO septic arthritis, gout, vs osteo COMPARISON: None. TECHNIQUE: Routine. Additional postgadolinium T1 sequences were obtained. IV CONTRAST: 8ml Gadavist FINDINGS: JOINTS AND BONES: -Extensive erosive osseous changes are seen in the left foot and ankle most pronounced in the first MTP joint as well as the second metatarsal head with additional erosive foci are seen in the midfoot, second mid and distal phalanx, with large proliferative erosive changes seen in the posterior subtalar joints and posterior aspect of the talar. These proliferative erosive soft tissue masses are relatively hypointense on T1 and isointense on T2-weighted heterogeneous enhancement on postcontrast  imaging. The bone marrow adjacent to the erosive masses is not significantly edematous or enhancing. TENDONS: -There is mild thickening and increased T2 signal of the distal aspect of the Achilles tendon (image 12, series 12). LIGAMENTS: -Lisfranc ligament: Intact. No subluxation. MUSCLES AND SOFT TISSUES: -Extensive large soft tissue masses is are seen in throughout the forefoot and hindfoot, which are low on T1 signal and intermediate T2 signal with variable postcontrast enhancement.     IMPRESSION: 1.  Multifocal extensive large soft tissue masses with underlying osseous erosion seen in the forefoot  and hindfoot, most pronounced at the first MTP joint and second metatarsal head as well as in the posterior aspect of the ankle joint, favored reflect large gouty tophi given the patient's history and enhancement characteristics. 2.  No evidence of drainable soft tissue abscess or definitive evidence of osteomyelitis is seen. 3.  Mild Achilles tendinosis    XR Foot 3 Views Standing Bilateral    Result Date: 7/3/2023  EXAM: XR FOOT 3 VIEWS STANDING BILATERAL LOCATION: [facility] DATE: 7/3/2023 INDICATION: Chronic bilateral foot wounds. Evaluate osteomyelitis or occult pathology. History of gout. COMPARISON: None.     IMPRESSION: Marked erosive/destructive changes involving the right second MTP joint with extensive destruction of the second metatarsal head and neck as well as the majority of the proximal middle phalanges most likely secondary to gout with marked surrounding soft tissue swelling. Mild periarticular erosive changes involving the right third and fourth TMT and third MTP joint. In the left foot, there are marked periarticular erosive changes involving the MTP joint of the great toe with marked surrounding soft tissue swelling most consistent with gout. Mild periarticular erosive changes involving the second MTP and third TMT joints.     XR Finger Right G/E 2 Views    Result Date: 7/3/2023  EXAM: XR FINGER RIGHT G/E 2 VIEWS LOCATION: [facility] DATE: 7/3/2023 INDICATION: right thumb pain, h o gout COMPARISON: 03/29/2021     IMPRESSION: Marked soft tissue swelling of the proximal soft tissues of the thumb with periarticular osteopenia which likely reflect the sequela of patient's known gout. There is slight subluxation of the distal phalanx of the thumb relative to the proximal phalanx, though no evidence of a displaced fracture or dislocation.     XR Ankle Left G/E 3 Views    Result Date: 7/3/2023  EXAM: XR ANKLE LEFT G/E 3 VIEWS LOCATION: [facility]  [facility] DATE: 7/3/2023 INDICATION: left ankle pain, h o gout COMPARISON: None.     IMPRESSION: No acute fracture or erosion. No foreign body.          LABORATORY DATA:   Recent Labs   Lab 07/13/23  1832   WBC 15.4*   HGB 9.7*   *     Recent Labs   Lab 07/10/23  0715   *     No results for input(s): INR in the last 168 hours.      IMPRESSION:   Persistent septic arthritis left ankle in the face of extensive tophaceous gout     PLAN:  Discussed with he and his  via  line.  Discussed the fact that this looks to be recurrence of his infection.  I believe a lot of the redness and around his leg and also on the rest of his body is most likely some sort of allergic rash as it is more itchy than painful.  That said there is active drainage from one of his portals which may be tophaceous but also appears to be septic particular in light of his  elevated white blood cell count and the swelling associated.  Plan after discussion with patient and family would be to do an arthrotomy including this area of active drainage to washout his ankle joint once again to hopefully preserve that.  After further discussion with hi him and his family via , they decided they did not want to have surgery today.  He reports that he has had many of his gouty tophi draine similar white material and he believes this is just more of that.  I did discuss with him that if there is persistent sepsis in his ankle this could permanently damage his ankle and lead to systemic illness, however, he says that his ankle feels markedly better than it did a week ago and he believes that the antibiotics are actually helping him.  Despite this he is refusing surgery.  He would like a chance for the new antibiotics to continue to help him before he agrees to undergo any further surgery.  Currently there is opening of the portal but the drainage is markedly slowed implying this could have been a tophaceous  pocket.  Consequently we discussed the risks and benefits of waiting and trying the next antibiotic regimen..  He would like to do so for 1 or 2 days to see if he does not continue to improve before he would agree to any further washout.  We will continue to follow him under that premise        Frandy Farmer MD

## 2023-07-13 NOTE — PHARMACY-VANCOMYCIN DOSING SERVICE
Pharmacy Vancomycin Initial Note  Date of Service 2023  Patient's  1980  42 year old, male    Indication: Abscess    Current estimated CrCl = Estimated Creatinine Clearance: 67.6 mL/min (A) (based on SCr of 1.37 mg/dL (H)).    Creatinine for last 3 days  2023: 11:10 AM Creatinine 1.37 mg/dL    Recent Vancomycin Level(s) for last 3 days  No results found for requested labs within last 3 days.      Vancomycin IV Administrations (past 72 hours)      No vancomycin orders with administrations in past 72 hours.                Nephrotoxins and other renal medications (From now, onward)    Start     Dose/Rate Route Frequency Ordered Stop    23 1730  vancomycin (VANCOCIN) 1,750 mg in sodium chloride 0.9 % 500 mL intermittent infusion         1,750 mg  over 2 Hours Intravenous ONCE 23 1729            Contrast Orders - past 72 hours (72h ago, onward)    None              Plan:  1. Start vancomycin  1750 mg IV once.  2. Please re-consult pharmacy if vancomycin continues inpatient.    Thank you for the consult  Eloisa Mendez [facility]

## 2023-07-13 NOTE — PHARMACY-ADMISSION MEDICATION HISTORY
Pharmacist Admission Medication History    Admission medication history is complete. The information provided in this note is only as accurate as the sources available at the time of the update.    Medication reconciliation/reorder completed by provider prior to medication history? No    Information Source(s): Patient, Prescription bottles and CareEverywhere/SureScripts via in-person    Pertinent Information: Patient stopped taking most of his medications on Tuesday due to a rash.    Changes made to PTA medication list:    Added: None    Deleted: None    Changed: Tylenol    Medication Affordability:       Allergies reviewed with patient and updates made in EHR: yes    Medication History Completed By: Eloisa Mendez RPH 7/13/2023 6:42 PM    Prior to Admission medications    Medication Sig Last Dose Taking? Auth Provider Long Term End Date   acetaminophen (TYLENOL) 325 MG tablet Take 650 mg by mouth every 8 hours as needed for mild pain 7/13/2023 at am Yes Unknown, Entered By History     aspirin 81 MG EC tablet Take 1 tablet (81 mg) by mouth 2 times daily 7/11/2023 Yes Tony Francois PA-C     cefTRIAXone (ROCEPHIN) 2 GM vial Inject 2 g into the vein every 24 hours for 38 days 7/12/2023 at 2100 Yes Kristyn Black MD  8/15/23   colchicine (COLCRYS) 0.6 MG tablet Take 1 tablet (0.6 mg) by mouth 2 times daily 7/11/2023 Yes Phill Mullins DO No    febuxostat (ULORIC) 40 MG TABS tablet Take 40 mg by mouth daily 7/11/2023 Yes Reported, Patient     hydrOXYzine (ATARAX) 25 MG tablet Take 1 tablet (25 mg) by mouth every 6 hours as needed for other (adjuvant pain) 7/13/2023 at am Yes Tony Francois PA-C     indomethacin (INDOCIN) 50 MG capsule Take 1 capsule (50 mg) by mouth 2 times daily (with meals) 7/11/2023 Yes Phill Mullins DO Yes    oxyCODONE (ROXICODONE) 5 MG tablet Take 1-2 tablets (5-10 mg) by mouth every 4 hours as needed for moderate pain 7/11/2023 Yes Tony Francois PA-C      senna-docusate (SENOKOT-S/PERICOLACE) 8.6-50 MG tablet Take 2 tablets by mouth 2 times daily for 7 days Unknown Yes Tony Francois PA-C  7/13/23

## 2023-07-13 NOTE — ED PROVIDER NOTES
EMERGENCY DEPARTMENT ENCOUNTER      NAME: Tyler Miller  AGE: 42 year old male  YOB: 1980  MRN: [id number]  EVALUATION DATE & TIME: 7/13/2023  4:32 PM    PCP: TOMÁS CARRIZALES    ED PROVIDER: Rajesh Borjas M.D.      Chief Complaint   Patient presents with     Rash         FINAL IMPRESSION:  Septic left ankle  Drug reaction  Tophaceous gout    ED COURSE & MEDICAL DECISION MAKING:    Pertinent Labs & Imaging studies reviewed. (See chart for details)  42 year old male presents to the Emergency Department for evaluation of severe ration general discomfort.  Patient recently hospitalized for septic left ankle.  This was washed out by orthopedics and noted to be streptococcal in nature.  Was placed on outpatient Rocephin.  Has been receiving this routinely.  However rash started developing just above the ankle on Tuesday and then continued to spread.  Now patient with itchy rash throughout.  States his ankle actually feels better on exam he is ill-appearing male.  Vital signs unremarkable.  No evidence of overt toxicity.  Patient with a sandpaper type rash throughout much of his torso.  Occasional scattered petechial type lesions.  Petechial lesions over the right foot and deep violaceous type rash to the left leg in the stocking distribution.  Please refer to photos below.  Purulent drainage from the lateral portal site suggesting recurrent septic joint.  Immediate call placed to infectious disease, orthopedics and the admitting physician.  Baseline blood work being obtained.. Patient appears non toxic with stable vitals signs.   4:33 PM I met with the patient for the initial interview and physical examination. Discussed plan for treatment and workup in the ED.    4:50 PM.  Patient discussed with infectious disease.  She recommends initiating clindamycin and vancomycin with discontinuation of Rocephin.  5:20 PM.  Patient discussed with the hospitalist.  He is agreeable with plan for admission.  5:40 PM.  Patient  discussed with orthopedic surgery.  Patient to be made NPO.  Plan is for repeat washout.  At the conclusion of the encounter I discussed the results of all of the tests and the disposition. The questions were answered and return precautions provided. The patient or family acknowledged understanding and was agreeable with the care plan.       Patient represents critical care situation.  Proximately 30 minutes spent directly involved in patient's care independent of any procedures     PPE: Provider wore gloves    Medical Decision Making    History:    Supplemental history from: Documented in chart, if applicable    External Record(s) reviewed: Documented in chart, if applicable.    Work Up:    Chart documentation includes differential considered and any EKGs or imaging independently interpreted by provider, where specified.    In additional to work up documented, I considered the following work up: Documented in chart, if applicable.    External consultation:    Discussion of management with another provider: Documented in chart, if applicable    Complicating factors:    Care impacted by chronic illness: Hypertension    Care affected by social determinants of health: N/A    Disposition considerations: Admit.      MEDICATIONS GIVEN IN THE EMERGENCY:  Medications - No data to display    NEW PRESCRIPTIONS STARTED AT TODAY'S ER VISIT  New Prescriptions    No medications on file          =================================================================    HPI    Patient information was obtained from: patient    Use of Intrepreter:  Yes (Phone) - Language Lloyd Miller is a 42 year old male with a pertient medical history of hypertension, chronic tophaceous gout, bone erosion who presents to the ED for evaluation of rash.    Per chart review, the patient presented to [facility] from 7/3/23 to 7/10/23 for acute idiopathic gout of right hand. He has severe tophaceous gout involving  multiple joints admitted for septic arthritis, bacteremia and acute gout flare.    The patient presented that he has rash to his left leg that started Tuesday (7/11/23). He reported that it occurred after he left the hospital on 7/10. He stated that he has been receiving IV antibiotics since discharge. He states that the pain in his left ankle has been getting better since.    Of note, he reports that his big spots have been on his body for awhile and the smaller spots started Tuesday and got worse over time. He denies fever. No other acute symptoms presented.       REVIEW OF SYSTEMS   Constitutional:  Denies fever, chills  Respiratory:  Denies productive cough or increased work of breathing  Cardiovascular:  Denies chest pain, palpitations  GI:  Denies abdominal pain, nausea, vomiting, or change in bowel or bladder habits   Musculoskeletal:  Denies any new muscle/joint swelling  Skin:  Positive for left leg rash.  Neurologic:  Denies focal weakness  All systems negative except as marked.     PAST MEDICAL HISTORY:  History reviewed. No pertinent past medical history.    PAST SURGICAL HISTORY:  Past Surgical History:   Procedure Laterality Date     IRRIGATION AND DEBRIDEMENT UPPER EXTREMITY, COMBINED Right 7/5/2023    Procedure: IRRIGATION AND DEBRIDEMENT, RIGHT THUMB, RIGHT SMALL FINGER;  Surgeon: Jermaine Park MD;  Location: [facility] [place] OR     PICC SINGLE LUMEN PLACEMENT  7/10/2023              CURRENT MEDICATIONS:    No current facility-administered medications for this encounter.    Current Outpatient Medications:      acetaminophen (TYLENOL) 325 MG tablet, Take 3 tablets (975 mg) by mouth every 8 hours, Disp: 100 tablet, Rfl: 0     aspirin 81 MG EC tablet, Take 1 tablet (81 mg) by mouth 2 times daily, Disp: 56 tablet, Rfl: 0     cefTRIAXone (ROCEPHIN) 2 GM vial, Inject 2 g into the vein every 24 hours for 38 days, Disp: 760 mL, Rfl: 0     colchicine (COLCRYS) 0.6 MG tablet, Take 1 tablet (0.6 mg) by mouth 2  "times daily, Disp: 60 tablet, Rfl: 3     febuxostat (ULORIC) 40 MG TABS tablet, Take 40 mg by mouth daily, Disp: , Rfl:      hydrOXYzine (ATARAX) 25 MG tablet, Take 1 tablet (25 mg) by mouth every 6 hours as needed for other (adjuvant pain), Disp: 30 tablet, Rfl: 0     indomethacin (INDOCIN) 50 MG capsule, Take 1 capsule (50 mg) by mouth 2 times daily (with meals), Disp: 60 capsule, Rfl: 0     oxyCODONE (ROXICODONE) 5 MG tablet, Take 1-2 tablets (5-10 mg) by mouth every 4 hours as needed for moderate pain, Disp: 26 tablet, Rfl: 0    ALLERGIES:  Allergies   Allergen Reactions     Allopurinol      \"Itching throughout body\"       FAMILY HISTORY:  History reviewed. No pertinent family history.    SOCIAL HISTORY:   Social History     Socioeconomic History     Marital status:    Tobacco Use     Smoking status: Never   Substance and Sexual Activity     Alcohol use: Not Currently     Drug use: Never       VITALS:  Patient Vitals for the past 24 hrs:   BP Temp Temp src Pulse Resp SpO2 Height Weight   07/13/23 1545 (!) 132/91 97.9  F (36.6  C) Oral 88 19 100 % 1.626 m (5' 4\") 78 kg (172 lb)        PHYSICAL EXAM    Constitutional:  Awake, alert, in no apparent distress  HENT:  Normocephalic, Atraumatic. Bilateral external ears normal. Oropharynx moist. Nose normal. Neck- Normal range of motion with no guarding, No midline cervical tenderness, Supple, No stridor.   Eyes:  PERRL, EOMI with no signs of entrapment, Conjunctiva normal, No discharge.   Respiratory:  Normal breath sounds, No respiratory distress, No wheezing.    Cardiovascular:  Normal heart rate, Normal rhythm, No appreciable rubs or gallops.   GI:  Soft, No tenderness, No distension, No palpable masses  Musculoskeletal:  Intact distal pulses, No edema. Good range of motion in all major joints. No tenderness to palpation or major deformities noted. Severe tophaceous gout involving feet and hands.   Integument:  Warm, Dry. Skin confluent fixed erythema " involving lower extremities below tibular plateau. Diffused some petechial, some blanching to tarsal, Petechial rash to right foot.  Neurologic:  Alert & oriented, Normal motor function, Normal sensory function, No focal deficits noted.   Psychiatric:  Affect normal, Judgment normal, Mood normal.                         LAB:  All pertinent labs reviewed and interpreted.   Labs pending.  No results found for any visits on 07/13/23.RADIOLOGY:  Reviewed all pertinent imaging. Please see official radiology report.  No orders to display             I, Sandra Griffin, am serving as a scribe to document services personally performed by Rajesh Borjas MD, based on my observation and the provider's statements to me. I, Rajesh Borjas MD attest that Sandra Griffin is acting in a scribe capacity, has observed my performance of the services and has documented them in accordance with my direction.    Rajesh Borjas M.D.  Emergency Medicine  [facility] EMERGENCY DEPARTMENT     Rajesh Borjas MD  07/13/23 2735

## 2023-07-13 NOTE — TELEPHONE ENCOUNTER
Per [facility] Pharmacist;  BUN is 25.4 (elevated from 18.7), SCr 1.37 (elevated from 1.15), Plt 564 (elevated from 524). Pt is currently on ceftriaxone so not a lab dependent med.    Just talked to patient. He is having bad rashes from stomachache medication he had (he doesn't know which one). He said he will go to ER or urgent care with his wife once wife comes back home.

## 2023-07-14 ENCOUNTER — TELEPHONE (OUTPATIENT)
Dept: FAMILY MEDICINE | Facility: CLINIC | Age: 43
End: 2023-07-14
Payer: COMMERCIAL

## 2023-07-14 LAB
ANION GAP SERPL CALCULATED.3IONS-SCNC: 16 MMOL/L (ref 7–15)
BASOPHILS # BLD AUTO: 0 10E3/UL (ref 0–0.2)
BASOPHILS NFR BLD AUTO: 0 %
BUN SERPL-MCNC: 17.9 MG/DL (ref 6–20)
CALCIUM SERPL-MCNC: 9.2 MG/DL (ref 8.6–10)
CHLORIDE SERPL-SCNC: 102 MMOL/L (ref 98–107)
CREAT SERPL-MCNC: 1.08 MG/DL (ref 0.67–1.17)
DEPRECATED HCO3 PLAS-SCNC: 16 MMOL/L (ref 22–29)
EOSINOPHIL # BLD AUTO: 0.4 10E3/UL (ref 0–0.7)
EOSINOPHIL NFR BLD AUTO: 3 %
ERYTHROCYTE [DISTWIDTH] IN BLOOD BY AUTOMATED COUNT: 18.6 % (ref 10–15)
GFR SERPL CREATININE-BSD FRML MDRD: 89 ML/MIN/1.73M2
GLUCOSE SERPL-MCNC: 95 MG/DL (ref 70–99)
HCT VFR BLD AUTO: 29.8 % (ref 40–53)
HGB BLD-MCNC: 9.2 G/DL (ref 13.3–17.7)
IMM GRANULOCYTES # BLD: 0.1 10E3/UL
IMM GRANULOCYTES NFR BLD: 1 %
LYMPHOCYTES # BLD AUTO: 1.2 10E3/UL (ref 0.8–5.3)
LYMPHOCYTES NFR BLD AUTO: 9 %
MCH RBC QN AUTO: 21.1 PG (ref 26.5–33)
MCHC RBC AUTO-ENTMCNC: 30.9 G/DL (ref 31.5–36.5)
MCV RBC AUTO: 69 FL (ref 78–100)
MONOCYTES # BLD AUTO: 0.4 10E3/UL (ref 0–1.3)
MONOCYTES NFR BLD AUTO: 3 %
NEUTROPHILS # BLD AUTO: 10.7 10E3/UL (ref 1.6–8.3)
NEUTROPHILS NFR BLD AUTO: 84 %
NRBC # BLD AUTO: 0 10E3/UL
NRBC BLD AUTO-RTO: 0 /100
PLATELET # BLD AUTO: 559 10E3/UL (ref 150–450)
POTASSIUM SERPL-SCNC: 4.1 MMOL/L (ref 3.4–5.3)
RBC # BLD AUTO: 4.35 10E6/UL (ref 4.4–5.9)
SODIUM SERPL-SCNC: 134 MMOL/L (ref 136–145)
WBC # BLD AUTO: 12.9 10E3/UL (ref 4–11)

## 2023-07-14 PROCEDURE — 36415 COLL VENOUS BLD VENIPUNCTURE: CPT | Performed by: INTERNAL MEDICINE

## 2023-07-14 PROCEDURE — 250N000011 HC RX IP 250 OP 636: Mod: JZ | Performed by: INTERNAL MEDICINE

## 2023-07-14 PROCEDURE — 82310 ASSAY OF CALCIUM: CPT | Performed by: INTERNAL MEDICINE

## 2023-07-14 PROCEDURE — 99232 SBSQ HOSP IP/OBS MODERATE 35: CPT | Performed by: INTERNAL MEDICINE

## 2023-07-14 PROCEDURE — 120N000001 HC R&B MED SURG/OB

## 2023-07-14 PROCEDURE — 250N000013 HC RX MED GY IP 250 OP 250 PS 637: Performed by: HOSPITALIST

## 2023-07-14 PROCEDURE — 258N000003 HC RX IP 258 OP 636: Performed by: INTERNAL MEDICINE

## 2023-07-14 PROCEDURE — 99233 SBSQ HOSP IP/OBS HIGH 50: CPT | Performed by: INTERNAL MEDICINE

## 2023-07-14 PROCEDURE — 250N000013 HC RX MED GY IP 250 OP 250 PS 637: Performed by: INTERNAL MEDICINE

## 2023-07-14 PROCEDURE — 250N000012 HC RX MED GY IP 250 OP 636 PS 637: Performed by: INTERNAL MEDICINE

## 2023-07-14 PROCEDURE — 85025 COMPLETE CBC W/AUTO DIFF WBC: CPT | Performed by: INTERNAL MEDICINE

## 2023-07-14 RX ORDER — DIPHENHYDRAMINE HCL 25 MG
25 CAPSULE ORAL EVERY 6 HOURS PRN
Status: DISCONTINUED | OUTPATIENT
Start: 2023-07-14 | End: 2023-07-15

## 2023-07-14 RX ORDER — DIPHENHYDRAMINE HCL 50 MG
50 CAPSULE ORAL ONCE
Status: COMPLETED | OUTPATIENT
Start: 2023-07-14 | End: 2023-07-14

## 2023-07-14 RX ADMIN — CLINDAMYCIN PHOSPHATE 600 MG: 600 INJECTION, SOLUTION INTRAVENOUS at 11:18

## 2023-07-14 RX ADMIN — PREDNISONE 30 MG: 20 TABLET ORAL at 17:55

## 2023-07-14 RX ADMIN — CLINDAMYCIN PHOSPHATE 600 MG: 600 INJECTION, SOLUTION INTRAVENOUS at 01:10

## 2023-07-14 RX ADMIN — SODIUM CHLORIDE, PRESERVATIVE FREE 1000 ML: 5 INJECTION INTRAVENOUS at 17:09

## 2023-07-14 RX ADMIN — CLINDAMYCIN PHOSPHATE 600 MG: 600 INJECTION, SOLUTION INTRAVENOUS at 17:44

## 2023-07-14 RX ADMIN — HYDROXYZINE HYDROCHLORIDE 50 MG: 50 TABLET ORAL at 01:10

## 2023-07-14 RX ADMIN — ACETAMINOPHEN 650 MG: 325 TABLET, FILM COATED ORAL at 17:02

## 2023-07-14 RX ADMIN — DIPHENHYDRAMINE HYDROCHLORIDE 50 MG: 50 CAPSULE ORAL at 04:25

## 2023-07-14 RX ADMIN — DIPHENHYDRAMINE HCL 25 MG: 25 CAPSULE ORAL at 14:04

## 2023-07-14 RX ADMIN — HYDROXYZINE HYDROCHLORIDE 50 MG: 50 TABLET ORAL at 15:30

## 2023-07-14 RX ADMIN — VANCOMYCIN HYDROCHLORIDE 1500 MG: 5 INJECTION, POWDER, LYOPHILIZED, FOR SOLUTION INTRAVENOUS at 08:48

## 2023-07-14 RX ADMIN — DIPHENHYDRAMINE HCL 25 MG: 25 CAPSULE ORAL at 21:56

## 2023-07-14 ASSESSMENT — ACTIVITIES OF DAILY LIVING (ADL)
ADLS_ACUITY_SCORE: 30
ADLS_ACUITY_SCORE: 35
ADLS_ACUITY_SCORE: 30
EQUIPMENT_CURRENTLY_USED_AT_HOME: WALKER, STANDARD
FALL_HISTORY_WITHIN_LAST_SIX_MONTHS: NO
TOILETING_ISSUES: YES
ADLS_ACUITY_SCORE: 35
WALKING_OR_CLIMBING_STAIRS: AMBULATION DIFFICULTY, ASSISTANCE 1 PERSON
ADLS_ACUITY_SCORE: 30
ADLS_ACUITY_SCORE: 35
TOILETING_MANAGEMENT: NEEDS WALKER
CONCENTRATING,_REMEMBERING_OR_MAKING_DECISIONS_DIFFICULTY: NO
DRESSING/BATHING_DIFFICULTY: YES
CHANGE_IN_FUNCTIONAL_STATUS_SINCE_ONSET_OF_CURRENT_ILLNESS/INJURY: YES
WALKING_OR_CLIMBING_STAIRS_DIFFICULTY: YES
WEAR_GLASSES_OR_BLIND: NO
ADLS_ACUITY_SCORE: 35
HEARING_DIFFICULTY_OR_DEAF: NO
ADLS_ACUITY_SCORE: 35
ADLS_ACUITY_SCORE: 30
DIFFICULTY_COMMUNICATING: NO
TOILETING_ASSISTANCE: TOILETING DIFFICULTY, REQUIRES EQUIPMENT
DOING_ERRANDS_INDEPENDENTLY_DIFFICULTY: YES
DRESSING/BATHING: BATHING DIFFICULTY, ASSISTANCE 1 PERSON
DIFFICULTY_EATING/SWALLOWING: NO
ADLS_ACUITY_SCORE: 30

## 2023-07-14 NOTE — PROGRESS NOTES
[facility]    Infectious Disease Progress Note    Date of Service : 07/14/2023     Assessment & Plan   Tyler Miller is a 42 year old male who was admitted on 7/13/2023.     ASSESSMENT:  1. Rash improving slightly-diffuse  2. Streptococcal coccus group A bacteremia, septic arthritis, gout discharged on ceftriaxone.  Ceftriaxone has been stopped and patient switched to vancomycin and clindamycin  3. 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  Patient declining repeat surgical procedure at this time                      RECOMMENDATIONS:    1. Antibiotics: Clindamycin and vancomycin  2. Follow culture results   3. Focus/de-escalate antibiotics based on final culture results-[place] discharge on IV vancomycin for 4 weeks  4. Monitor CBC, CMP  5. Ordered treatment for rash, prednisone   6. Discussed with patient and nurse   7. Orthopedic surgery notes reviewed  8. ID will follow      Kristyn Black MD  [facility]  [phone]      Interval History   Probably better, rash slightly less erythematous though still diffuse and present    Physical Exam   Temp: 98.4  F (36.9  C) Temp src: Oral BP: (!) 142/88 Pulse: 77   Resp: 18 SpO2: 100 % O2 Device: None (Room air)    Vitals:    07/13/23 1545 07/14/23 1415   Weight: 78 kg (172 lb) 74.6 kg (164 lb 7.4 oz)     Vital Signs with Ranges  Temp:  [98  F (36.7  C)-98.4  F (36.9  C)] 98.4  F (36.9  C)  Pulse:  [77-91] 77  Resp:  [15-22] 18  BP: (117-142)/(66-97) 142/88  SpO2:  [97 %-100 %] 100 %    Constitutional: Awake, no apparent distress  Lungs: normal breathing pattern, no crackles or wheezing  Cardiovascular: Regular rate and rhythm, S1 S2  Abdomen: non distended  Skin: warm Fuhs rash, less erythematous than yesterday  Neuro: deconditioned  Close girdle: Multiple areas of tophaceous gout, chalky drainage from ankle  Psych: able to answer questions    Medications     sodium chloride 100 mL/hr at 07/14/23 1401       clindamycin   600 mg Intravenous Q8H     sodium chloride (PF)  3 mL Intracatheter Q8H     vancomycin  1,500 mg Intravenous Q24H       Data   All microbiology laboratory data reviewed.  Recent Labs   Lab Test 07/14/23 0739 07/13/23 1832 07/11/23  1110   WBC 12.9* 15.4* 12.2*   HGB 9.2* 9.7* 9.5*   HCT 29.8* 31.1* 30.6*   MCV 69* 67* 68*   * 553* 564*     Recent Labs   Lab Test 07/14/23 0739 07/13/23 1832 07/11/23  1110   CR 1.08 1.22* 1.37*     Recent Labs   Lab Test 07/11/23  1110   *     No lab results found.    Invalid input(s):     MICROBIOLOGY:    Reviewed    7-Day Micro Results     Collected Updated Procedure Result Status      07/13/2023 1748 07/13/2023 1752 Anaerobic Bacterial Culture Routine [[id number]]   Wound from Ankle, Left    In process Component Value   No component results               07/13/2023 1748 07/14/2023 1247 Wound Aerobic Bacterial Culture Routine [[id number]]   Wound from Foot, Left    Preliminary result Component Value   Culture No growth, less than 1 day  [P]                07/13/2023 1741 07/14/2023 0846 Blood Culture Peripheral Blood [[id number]]   Peripheral Blood    Preliminary result Component Value   Culture No growth after 12 hours  [P]                07/13/2023 1741 07/14/2023 0846 Blood Culture Peripheral Blood [[id number]]   Peripheral Blood    Preliminary result Component Value   Culture No growth after 12 hours  [P]                       RADIOLOGY:    Reviewed  MR Ankle Left w/o & w Contrast    Result Date: 7/9/2023  EXAM: MR ANKLE LEFT WITHOUT AND WITH CONTRAST, MR FOOT LEFT WITHOUT AND WITH CONTRAST LOCATION: [facility] DATE: 07/09/2023 INDICATION: Assess for reaccumulation of abscess. Bacteremia and septic arthritis. COMPARISON: 07/04/2023. TECHNIQUE: Routine. Additional postgadolinium T1 sequences were obtained. IV CONTRAST: 8 mL Gadavist. FINDINGS: Again seen are extensive tophus formation with erosion in the forefoot and hindfoot  compatible with gouty arthropathy. Superimposed infection within the areas of tophus formation is difficult to exclude by imaging as there is considerable surrounding soft tissue enhancement along the peripheral aspect of the tophus as well as extensive soft tissue edema and inflammation. Tophus formation is seen about the joints as well as within the subcutaneous soft tissues. There is tophus formation intermixed with complex fluid along the anterolateral aspect of the ankle superficial to the lateral ankle ligaments and fibula as seen on axial image 23. There is patchy bone marrow edema along portions of the talus, and calcaneus. Patchy bone marrow edema across the TMT joints. The fluid in the tibiotalar and subtalar joints has decreased, noting there is a small amount of residual fluid in the tibiotalar joint. There is multifocal tenosynovitis with moderate to severe tenosynovitis along the medial flexor tendons where there is complex fluid. There is no evidence of a drainable soft tissue abscess or definitive evidence of osteomyelitis. No significant joint effusion within the forefoot or midfoot. No acute tendon abnormality, with Achilles tendinosis again seen, with intratendinous tophus. There is chronic appearing osteonecrosis of the distal tibia. No definitive evidence of osteomyelitis. There is considerable dorsal subcutaneous edema and reticulation without a well-defined rim-enhancing dorsal fluid collection. This is presumably inflammatory or infectious.     IMPRESSION: 1.  Extensive tophus formation and soft tissue inflammation about the forefoot and hindfoot compatible with gouty arthropathy. Superimposed infection within the areas of tophus formation is difficult to exclude by imaging. 2.  The amount of joint fluid in the tibiotalar and subtalar joints has decreased from the prior exam, with a small amount of residual versus recurrent fluid. 3.  Multifocal tenosynovitis, most pronounced along the medial  flexor tendons where there is complex tenosynovial fluid. 4.  Along the anterolateral ankle superficial to the lateral ankle ligaments and fibula, there is tophus formation intermixed with superimposed poorly defined defined fluid along the superficial aspect of the tophus. Infection at this site is difficult to exclude. 5.  No definitive evidence of osteomyelitis, noting there is reactive bone marrow edema primarily in the hindfoot and ankle. 6.   Chronic appearing osteonecrosis of the distal tibia.     MR Foot Left w/o & w Contrast    Result Date: 7/9/2023  EXAM: MR ANKLE LEFT WITHOUT AND WITH CONTRAST, MR FOOT LEFT WITHOUT AND WITH CONTRAST LOCATION: [facility] DATE: 07/09/2023 INDICATION: Assess for reaccumulation of abscess. Bacteremia and septic arthritis. COMPARISON: 07/04/2023. TECHNIQUE: Routine. Additional postgadolinium T1 sequences were obtained. IV CONTRAST: 8 mL Gadavist. FINDINGS: Again seen are extensive tophus formation with erosion in the forefoot and hindfoot compatible with gouty arthropathy. Superimposed infection within the areas of tophus formation is difficult to exclude by imaging as there is considerable surrounding soft tissue enhancement along the peripheral aspect of the tophus as well as extensive soft tissue edema and inflammation. Tophus formation is seen about the joints as well as within the subcutaneous soft tissues. There is tophus formation intermixed with complex fluid along the anterolateral aspect of the ankle superficial to the lateral ankle ligaments and fibula as seen on axial image 23. There is patchy bone marrow edema along portions of the talus, and calcaneus. Patchy bone marrow edema across the TMT joints. The fluid in the tibiotalar and subtalar joints has decreased, noting there is a small amount of residual fluid in the tibiotalar joint. There is multifocal tenosynovitis with moderate to severe tenosynovitis along the medial flexor tendons  "where there is complex fluid. There is no evidence of a drainable soft tissue abscess or definitive evidence of osteomyelitis. No significant joint effusion within the forefoot or midfoot. No acute tendon abnormality, with Achilles tendinosis again seen, with intratendinous tophus. There is chronic appearing osteonecrosis of the distal tibia. No definitive evidence of osteomyelitis. There is considerable dorsal subcutaneous edema and reticulation without a well-defined rim-enhancing dorsal fluid collection. This is presumably inflammatory or infectious.     IMPRESSION: 1.  Extensive tophus formation and soft tissue inflammation about the forefoot and hindfoot compatible with gouty arthropathy. Superimposed infection within the areas of tophus formation is difficult to exclude by imaging. 2.  The amount of joint fluid in the tibiotalar and subtalar joints has decreased from the prior exam, with a small amount of residual versus recurrent fluid. 3.  Multifocal tenosynovitis, most pronounced along the medial flexor tendons where there is complex tenosynovial fluid. 4.  Along the anterolateral ankle superficial to the lateral ankle ligaments and fibula, there is tophus formation intermixed with superimposed poorly defined defined fluid along the superficial aspect of the tophus. Infection at this site is difficult to exclude. 5.  No definitive evidence of osteomyelitis, noting there is reactive bone marrow edema primarily in the hindfoot and ankle. 6.   Chronic appearing osteonecrosis of the distal tibia.     POC US Guidance Needle Placement    Result Date: 7/5/2023  Ultrasound was performed as guidance to an anesthesia procedure.  Click \"PACS images\" hyperlink below to view any stored images.  For specific procedure details, view procedure note authored by anesthesia.    US Joint Injection Aspiration Major Left    Result Date: 7/4/2023  EXAM: 1. PUNCTURE AND ASPIRATION LEFT ANKLE JOINT 2. ULTRASOUND GUIDANCE LOCATION: " [facility] DATE: 7/4/2023 INDICATION: left ankle  known gout, also Strep bacteremia, evaluate for septic arthritis. PROCEDURE: Informed consent obtained. Time out performed. The site was prepped and draped in sterile fashion. 10 mL of 1% lidocaine was infused into the local soft tissues. Under direct ultrasound guidance, a 22 gauge needle was placed into the fluid pocket and 5 ml of brown, cloudy fluid was aspirated. This was sent for cultures.     IMPRESSION: 1.  Status post ultrasound-guided puncture and aspiration of the left ankle joint.     MR Foot Left w/o & w Contrast    Result Date: 7/4/2023  EXAM: MR FOOT LEFT W/O and W CONTRAST, MR ANKLE LEFT W/O and W CONTRAST LOCATION: [facility] DATE: 7/4/2023 INDICATION: left ankle pain, erosion of 2nd MT and phalanx, RO septic arthritis, gout, vs osteo COMPARISON: None. TECHNIQUE: Routine. Additional postgadolinium T1 sequences were obtained. IV CONTRAST: 8ml Gadavist FINDINGS: JOINTS AND BONES: -Extensive erosive osseous changes are seen in the left foot and ankle most pronounced in the first MTP joint as well as the second metatarsal head with additional erosive foci are seen in the midfoot, second mid and distal phalanx, with large proliferative erosive changes seen in the posterior subtalar joints and posterior aspect of the talar. These proliferative erosive soft tissue masses are relatively hypointense on T1 and isointense on T2-weighted heterogeneous enhancement on postcontrast  imaging. The bone marrow adjacent to the erosive masses is not significantly edematous or enhancing. TENDONS: -There is mild thickening and increased T2 signal of the distal aspect of the Achilles tendon (image 12, series 12). LIGAMENTS: -Lisfranc ligament: Intact. No subluxation. MUSCLES AND SOFT TISSUES: -Extensive large soft tissue masses is are seen in throughout the forefoot and hindfoot, which are low on T1 signal and intermediate  T2 signal with variable postcontrast enhancement.     IMPRESSION: 1.  Multifocal extensive large soft tissue masses with underlying osseous erosion seen in the forefoot and hindfoot, most pronounced at the first MTP joint and second metatarsal head as well as in the posterior aspect of the ankle joint, favored reflect large gouty tophi given the patient's history and enhancement characteristics. 2.  No evidence of drainable soft tissue abscess or definitive evidence of osteomyelitis is seen. 3.  Mild Achilles tendinosis    MR Ankle Left w/o & w Contrast    Result Date: 7/4/2023  EXAM: MR FOOT LEFT W/O and W CONTRAST, MR ANKLE LEFT W/O and W CONTRAST LOCATION: [facility] DATE: 7/4/2023 INDICATION: left ankle pain, erosion of 2nd MT and phalanx, RO septic arthritis, gout, vs osteo COMPARISON: None. TECHNIQUE: Routine. Additional postgadolinium T1 sequences were obtained. IV CONTRAST: 8ml Gadavist FINDINGS: JOINTS AND BONES: -Extensive erosive osseous changes are seen in the left foot and ankle most pronounced in the first MTP joint as well as the second metatarsal head with additional erosive foci are seen in the midfoot, second mid and distal phalanx, with large proliferative erosive changes seen in the posterior subtalar joints and posterior aspect of the talar. These proliferative erosive soft tissue masses are relatively hypointense on T1 and isointense on T2-weighted heterogeneous enhancement on postcontrast  imaging. The bone marrow adjacent to the erosive masses is not significantly edematous or enhancing. TENDONS: -There is mild thickening and increased T2 signal of the distal aspect of the Achilles tendon (image 12, series 12). LIGAMENTS: -Lisfranc ligament: Intact. No subluxation. MUSCLES AND SOFT TISSUES: -Extensive large soft tissue masses is are seen in throughout the forefoot and hindfoot, which are low on T1 signal and intermediate T2 signal with variable postcontrast  enhancement.     IMPRESSION: 1.  Multifocal extensive large soft tissue masses with underlying osseous erosion seen in the forefoot and hindfoot, most pronounced at the first MTP joint and second metatarsal head as well as in the posterior aspect of the ankle joint, favored reflect large gouty tophi given the patient's history and enhancement characteristics. 2.  No evidence of drainable soft tissue abscess or definitive evidence of osteomyelitis is seen. 3.  Mild Achilles tendinosis    XR Foot 3 Views Standing Bilateral    Result Date: 7/3/2023  EXAM: XR FOOT 3 VIEWS STANDING BILATERAL LOCATION: [facility] DATE: 7/3/2023 INDICATION: Chronic bilateral foot wounds. Evaluate osteomyelitis or occult pathology. History of gout. COMPARISON: None.     IMPRESSION: Marked erosive/destructive changes involving the right second MTP joint with extensive destruction of the second metatarsal head and neck as well as the majority of the proximal middle phalanges most likely secondary to gout with marked surrounding soft tissue swelling. Mild periarticular erosive changes involving the right third and fourth TMT and third MTP joint. In the left foot, there are marked periarticular erosive changes involving the MTP joint of the great toe with marked surrounding soft tissue swelling most consistent with gout. Mild periarticular erosive changes involving the second MTP and third TMT joints.     XR Finger Right G/E 2 Views    Result Date: 7/3/2023  EXAM: XR FINGER RIGHT G/E 2 VIEWS LOCATION: [facility] DATE: 7/3/2023 INDICATION: right thumb pain, h o gout COMPARISON: 03/29/2021     IMPRESSION: Marked soft tissue swelling of the proximal soft tissues of the thumb with periarticular osteopenia which likely reflect the sequela of patient's known gout. There is slight subluxation of the distal phalanx of the thumb relative to the proximal phalanx, though no evidence of a displaced fracture  or dislocation.     XR Ankle Left G/E 3 Views    Result Date: 7/3/2023  EXAM: XR ANKLE LEFT G/E 3 VIEWS LOCATION: [facility] DATE: 7/3/2023 INDICATION: left ankle pain, h o gout COMPARISON: None.     IMPRESSION: No acute fracture or erosion. No foreign body.     Attestation:  Total time on the floor involved in the patient's care: minutes. Chart reviewed, reviewed cultures, external notes, labs, antiinfective monitoring, evaluation, counseling, management    Medical Decision Making       35 MINUTES SPENT BY ME on the date of service doing chart review, history, exam, documentation & further activities per the note.  MANAGEMENT DISCUSSED with the following over the past 24 hours: nurse, patient   NOTE(S)/MEDICAL RECORDS REVIEWED over the past 24 hours: yes  Tests ORDERED & REVIEWED in the past 24 hours:  Tests ORDERED in the past 24 hours:   Tests REVIEWED in the past 24 hours:  Tests personally interpreted in the past 24 hours:  **CLEAR ALL SELECTIONS**  SUPPLEMENTAL HISTORY, in addition to the patient's history, over the past 24 hours obtained from:   Medical complexity over the past 24 hours:  - Intensive monitoring for MEDICATION TOXICITY  - Prescription DRUG MANAGEMENT performed  - Decision regarding MAJOR SURGERY without additional risks

## 2023-07-14 NOTE — PLAN OF CARE
Occupational Therapy: Orders received. Chart reviewed and discussed with care team.? Occupational Therapy not indicated due to Pt declined OT as he just had OT and feels he has no needs at this time..? Defer discharge recommendations to MD.? Will complete orders.

## 2023-07-14 NOTE — ED NOTES
"[facility] ED Handoff Report    ED Chief Complaint: rash    ED Diagnosis:  (M00.272) Streptococcal arthritis of left ankle (H)  Comment:   Plan: CANCELED: Case Request: ARTHROTOMY, ANKLE            (G92.057M) Medication reaction, initial encounter  Comment:   Plan:    (M1A.9XX1) Tophaceous gout  Comment:   Plan:        PMH:  History reviewed. No pertinent past medical history.     Code Status:  Full Code     Falls Risk: No Band: Not applicable    Current Living Situation/Residence: lives in a house w/ wife    Elimination Status: Continent: Yes     Activity Level: Independent    Patients Preferred Language:  Other: hmong but can speak english     Needed: Yes for medical information    Vital Signs:  /73   Pulse 85   Temp 98.4  F (36.9  C) (Oral)   Resp 22   Ht 1.626 m (5' 4\")   Wt 78 kg (172 lb)   SpO2 99%   BMI 29.52 kg/m       Cardiac Rhythm: n/a    Pain Score: 5/10    Is the Patient Confused:  No    Last Food or Drink:  ice chips  at 1000. NPO ex meds and ice chips    Focused Assessment:  A&Ox4. Ind. Has rash on body, extremeties swollen.     Tests Performed: Done: Labs and Imaging    Treatments Provided:  IV abx. PRN atarax and benadryl for itching    Family Dynamics/Concerns: No    Family Updated On Visitor Policy: No    Family updated on plan of care: Yes wife     Who Was Updated about Plan of Care: pt will update - wife at bedside now with MD rounding    Belongings Checklist Done and Signed by Patient: Yes    Medications sent with patient: n/a    Covid: asymptomatic , not tested    Additional Information: PICC line to R arm    RN: Tarik Clemons RN   7/14/2023 11:51 AM       "

## 2023-07-14 NOTE — PROGRESS NOTES
Patient came up from ED about 1415. Settled in room, oriented patient to room, routine and how to use call light. Wife at bedside. Went over admission questions using [employer]  via [employer]. Sent a text page to MD Jacques because patient is wondering if he can eat anything yet. Will continue to monitor.     Radha Law RN 7/14/2023 3:15 PM

## 2023-07-14 NOTE — TELEPHONE ENCOUNTER
Left message to call back for: Tyler- no answer VM full  Information to relay to patient: Please relay message below that Tyler needs to schedule hospital follow up with his PCP

## 2023-07-14 NOTE — PROGRESS NOTES
[facility]    Patient readmitted for a body rash on 7/13/2023.    Patient is currently on service with [facility] for Ceftriaxone 2g IV q 24 hours.      Pt's Access Device: SL valved PICC   Nursing provided by: [facility]    [facility] will follow along.    Thank you,     Kaylah Acosta RN, BSN  [facility]  [phone] (Monday-Fri 8:00 am-5:00 pm)  [phone] Office

## 2023-07-14 NOTE — PROGRESS NOTES
2330 Received patient in bed, admitted due to  increasing redness of left leg with itching and a papular rash all over his body -presumed to be a  allergy to the antibiotic.   Patient is alert and oriented x 4. VSS, on room air. Ongoing IVF of NS at 100ml/hr.   On scheduled IV antibiotics.   NPO as ordered.   Reporting itchiness all over his body, PRN given.  0400 MD paged re: itchiness, PRN Benadryl x 1 dose given.

## 2023-07-14 NOTE — PLAN OF CARE
Physical Therapy: Orders received. Chart reviewed and discussed with care team.?     Physical Therapy not indicated due to patient request - patient reports he worked with PT/OT last week during admit, was provided with a FWW for home, has been using FWW for all mobility and is able to perform all bADL.?Patient able to sit up in bed indep without concern, family member present also denies concerns.    Defer discharge recommendations to CHARLEE GOLDEN.? Will complete orders.

## 2023-07-14 NOTE — UTILIZATION REVIEW
Admission Status; Secondary Review Determination   Under the authority of the Utilization Management Committee, the utilization review process indicated a secondary review on Tyler Miller. The review outcome is based on review of the medical records, discussions with staff, and applying clinical experience noted on the date of the review.   (x) Inpatient Status Appropriate - This patient's medical care is consistent with medical management for inpatient care and reasonable inpatient medical practice.     RATIONALE FOR DETERMINATION   Tyler Miller is a 42 yr old male with hx of septic arthritis left ankle in setting of severe tophaceous gouty arthritis of multiple joints and recent arthroscopic I and D 1 week ago and discharged on IV ceftriaxone who presented on 7/13/23 with increase left leg redness but also rash diffuse.  Concern for drug reaction to ceftriaxone and changed to vancomycin/clindamycin as per ID recs but also Ortho with concern for ongoing/recurrence of septic joint and recommending arthrotomy and washout again but patient has been declining surgery and prefers to attempt IV abx a little longer.  His WBC is rising and his redness is progressing.  Not stable for discharge as anticipate surgery will be needed at this time.    At the time of admission with the information available to the attending physician more than 2 nights Hospital complex care was anticipated, based on patient risk of adverse outcome if treated as outpatient and complex care required. Inpatient admission is appropriate based on the Medicare guidelines.   The information on this document is developed by the utilization review team in order for the business office to ensure compliance. This only denotes the appropriateness of proper admission status and does not reflect the quality of care rendered.   The definitions of Inpatient Status and Observation Status used in making the determination above are those provided in the CMS Coverage  Manual, Chapter 1 and Chapter 6, section 70.4.   Sincerely,   Mar Cardenas MD  Utilization Review  Physician Advisor  [facility]

## 2023-07-14 NOTE — PROGRESS NOTES
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 Farmer discussed need for washout with patient, based on patient's clinical picture will hold off on washout for the time being.  Suspect draining fluid is tophaceous fluid, not purulent drainage.  Redness of ankle most likely medication reaction, hopefully will improve while off medication  - Ideally patient would stay for continued monitoring to ensure he is having improvement with rash.  Patient expresses desire to leave today as he feels that he is doing well.  Per Dr. Farmer, will continue to monitor if he stays, but okay to leave on antibiotics per medical team and close follow up with Dr. Gonzalez on Monday  - WBAT LLE  - Continue PT/OT      Subjective:    Patient reports feeling well today. Feels pain has improved, has good ROM of ankle.  Feels pain is much better still since previous ankle washout.  Desires to go home with medications to help with rash, does not think he needs new washout or further management.  Denies fevers/chills today.  All questions/concerns answered.      Objective:  /73   Pulse 85   Temp 98.4  F (36.9  C) (Oral)   Resp 22   Ht 1.626 m (5' 4\")   Wt 78 kg (172 lb)   SpO2 99%   BMI 29.52 kg/m      Patient is A&Ox3, sitting up in bed  Calves without tenderness, neg Krista's  Brisk capillary refill in the toes.   Palpable Left dorsalis pedis pulse. Left foot warm & well-perfused.  Sensation is intact to light touch & equal bilaterally in the femoral, DP, SP & tibial nerve distributions.  TTP of dorsal foot  Ankle DF/PF/inversion/eversion without pain.  TIB ANT, PF, EHL  5/5    Contralateral side= Full range of motion, Negative joint instability findings, 5/5 motor groups about the joint, Non-tender.       Imaging  None    Pertinent Labs   Lab Results: personally reviewed.   Lab Results   Component Value Date    INR 1.09 07/13/2023     Lab Results   Component Value Date    " WBC 12.9 (H) 07/14/2023    HGB 9.2 (L) 07/14/2023    HCT 29.8 (L) 07/14/2023    MCV 69 (L) 07/14/2023     (H) 07/14/2023     Lab Results   Component Value Date     (L) 07/14/2023    CO2 16 (L) 07/14/2023         Report completed by:  JOHNNY SRIVASTAVA PA-C  Date: 7/14/2023  Time: 10:09 AM  [facility]

## 2023-07-14 NOTE — TELEPHONE ENCOUNTER
----- Message from Megha Valle DO sent at 7/11/2023  4:10 PM CDT -----  Dr. Barrera no longer works here and did not order these lab tests.  She has never seen this patient.   On chart review I am not able to see who ordered these tests, I believe they were obtained at a home care visit. I am not sure who the managing physician currently is. No hospital follow up visit scheduled.   Please find out who is managing this patient's antibiotics.  Is it Ortho?  Results should be forwarded to whoever is managing his antibiotics outpatient.  He likely needs to schedule hospital follow-up with his PCP.  Megha Valle,

## 2023-07-15 LAB
ALBUMIN SERPL BCG-MCNC: 3.3 G/DL (ref 3.5–5.2)
ALP SERPL-CCNC: 248 U/L (ref 40–129)
ALT SERPL W P-5'-P-CCNC: 37 U/L (ref 0–70)
ANION GAP SERPL CALCULATED.3IONS-SCNC: 14 MMOL/L (ref 7–15)
AST SERPL W P-5'-P-CCNC: 29 U/L (ref 0–45)
BACTERIA WND CULT: NO GROWTH
BASOPHILS # BLD AUTO: 0 10E3/UL (ref 0–0.2)
BASOPHILS NFR BLD AUTO: 0 %
BILIRUB SERPL-MCNC: 0.4 MG/DL
BUN SERPL-MCNC: 13.6 MG/DL (ref 6–20)
CALCIUM SERPL-MCNC: 9.1 MG/DL (ref 8.6–10)
CHLORIDE SERPL-SCNC: 100 MMOL/L (ref 98–107)
CREAT SERPL-MCNC: 1.12 MG/DL (ref 0.67–1.17)
CREAT SERPL-MCNC: 1.13 MG/DL (ref 0.67–1.17)
DEPRECATED HCO3 PLAS-SCNC: 17 MMOL/L (ref 22–29)
EOSINOPHIL # BLD AUTO: 0.4 10E3/UL (ref 0–0.7)
EOSINOPHIL NFR BLD AUTO: 5 %
ERYTHROCYTE [DISTWIDTH] IN BLOOD BY AUTOMATED COUNT: 18.7 % (ref 10–15)
GFR SERPL CREATININE-BSD FRML MDRD: 84 ML/MIN/1.73M2
GFR SERPL CREATININE-BSD FRML MDRD: 85 ML/MIN/1.73M2
GLUCOSE SERPL-MCNC: 78 MG/DL (ref 70–99)
HCT VFR BLD AUTO: 29.6 % (ref 40–53)
HGB BLD-MCNC: 8.8 G/DL (ref 13.3–17.7)
HOLD SPECIMEN: NORMAL
IMM GRANULOCYTES # BLD: 0 10E3/UL
IMM GRANULOCYTES NFR BLD: 0 %
LYMPHOCYTES # BLD AUTO: 2.4 10E3/UL (ref 0.8–5.3)
LYMPHOCYTES NFR BLD AUTO: 28 %
MCH RBC QN AUTO: 20.8 PG (ref 26.5–33)
MCHC RBC AUTO-ENTMCNC: 29.7 G/DL (ref 31.5–36.5)
MCV RBC AUTO: 70 FL (ref 78–100)
MONOCYTES # BLD AUTO: 0.5 10E3/UL (ref 0–1.3)
MONOCYTES NFR BLD AUTO: 6 %
NEUTROPHILS # BLD AUTO: 5.2 10E3/UL (ref 1.6–8.3)
NEUTROPHILS NFR BLD AUTO: 61 %
NRBC # BLD AUTO: 0 10E3/UL
NRBC BLD AUTO-RTO: 0 /100
PLATELET # BLD AUTO: 598 10E3/UL (ref 150–450)
POTASSIUM SERPL-SCNC: 4 MMOL/L (ref 3.4–5.3)
PROT SERPL-MCNC: 8.5 G/DL (ref 6.4–8.3)
RBC # BLD AUTO: 4.24 10E6/UL (ref 4.4–5.9)
SODIUM SERPL-SCNC: 131 MMOL/L (ref 136–145)
VANCOMYCIN SERPL-MCNC: 7.3 UG/ML
WBC # BLD AUTO: 8.6 10E3/UL (ref 4–11)

## 2023-07-15 PROCEDURE — 99232 SBSQ HOSP IP/OBS MODERATE 35: CPT | Performed by: INTERNAL MEDICINE

## 2023-07-15 PROCEDURE — 85025 COMPLETE CBC W/AUTO DIFF WBC: CPT | Performed by: INTERNAL MEDICINE

## 2023-07-15 PROCEDURE — 36415 COLL VENOUS BLD VENIPUNCTURE: CPT | Performed by: INTERNAL MEDICINE

## 2023-07-15 PROCEDURE — 250N000013 HC RX MED GY IP 250 OP 250 PS 637: Performed by: INTERNAL MEDICINE

## 2023-07-15 PROCEDURE — 80053 COMPREHEN METABOLIC PANEL: CPT | Performed by: INTERNAL MEDICINE

## 2023-07-15 PROCEDURE — 258N000003 HC RX IP 258 OP 636: Performed by: INTERNAL MEDICINE

## 2023-07-15 PROCEDURE — 120N000001 HC R&B MED SURG/OB

## 2023-07-15 PROCEDURE — 250N000011 HC RX IP 250 OP 636: Mod: JZ | Performed by: INTERNAL MEDICINE

## 2023-07-15 PROCEDURE — 250N000012 HC RX MED GY IP 250 OP 636 PS 637: Performed by: INTERNAL MEDICINE

## 2023-07-15 PROCEDURE — 99233 SBSQ HOSP IP/OBS HIGH 50: CPT | Performed by: INTERNAL MEDICINE

## 2023-07-15 PROCEDURE — 80202 ASSAY OF VANCOMYCIN: CPT | Performed by: INTERNAL MEDICINE

## 2023-07-15 PROCEDURE — 250N000011 HC RX IP 250 OP 636: Performed by: INTERNAL MEDICINE

## 2023-07-15 RX ORDER — VANCOMYCIN HYDROCHLORIDE 1 G/200ML
1000 INJECTION, SOLUTION INTRAVENOUS EVERY 12 HOURS
Status: DISCONTINUED | OUTPATIENT
Start: 2023-07-15 | End: 2023-07-15

## 2023-07-15 RX ORDER — MEROPENEM 1 G/1
1 INJECTION, POWDER, FOR SOLUTION INTRAVENOUS EVERY 8 HOURS
Status: DISCONTINUED | OUTPATIENT
Start: 2023-07-15 | End: 2023-07-17

## 2023-07-15 RX ORDER — TRIAMCINOLONE ACETONIDE 1 MG/G
CREAM TOPICAL 2 TIMES DAILY
Status: DISCONTINUED | OUTPATIENT
Start: 2023-07-15 | End: 2023-07-17 | Stop reason: HOSPADM

## 2023-07-15 RX ORDER — DIPHENHYDRAMINE HCL 25 MG
25 CAPSULE ORAL EVERY 8 HOURS
Status: DISCONTINUED | OUTPATIENT
Start: 2023-07-15 | End: 2023-07-17 | Stop reason: HOSPADM

## 2023-07-15 RX ORDER — PREDNISONE 20 MG/1
40 TABLET ORAL DAILY
Status: COMPLETED | OUTPATIENT
Start: 2023-07-15 | End: 2023-07-16

## 2023-07-15 RX ORDER — VANCOMYCIN HYDROCHLORIDE 1 G/200ML
1000 INJECTION, SOLUTION INTRAVENOUS EVERY 12 HOURS
Qty: 11200 ML | Refills: 0
Start: 2023-07-17 | End: 2023-07-16

## 2023-07-15 RX ADMIN — MEROPENEM 1 G: 1 INJECTION, POWDER, FOR SOLUTION INTRAVENOUS at 16:22

## 2023-07-15 RX ADMIN — HYDROXYZINE HYDROCHLORIDE 50 MG: 50 TABLET ORAL at 06:21

## 2023-07-15 RX ADMIN — PREDNISONE 40 MG: 20 TABLET ORAL at 16:20

## 2023-07-15 RX ADMIN — SODIUM CHLORIDE, PRESERVATIVE FREE: 5 INJECTION INTRAVENOUS at 02:50

## 2023-07-15 RX ADMIN — MICAFUNGIN SODIUM 100 MG: 50 INJECTION, POWDER, LYOPHILIZED, FOR SOLUTION INTRAVENOUS at 17:30

## 2023-07-15 RX ADMIN — ACETAMINOPHEN 650 MG: 325 TABLET, FILM COATED ORAL at 04:43

## 2023-07-15 RX ADMIN — TRIAMCINOLONE ACETONIDE: 1 CREAM TOPICAL at 22:58

## 2023-07-15 RX ADMIN — VANCOMYCIN HYDROCHLORIDE 1500 MG: 5 INJECTION, POWDER, LYOPHILIZED, FOR SOLUTION INTRAVENOUS at 08:08

## 2023-07-15 RX ADMIN — DIPHENHYDRAMINE HCL 25 MG: 25 CAPSULE ORAL at 17:29

## 2023-07-15 RX ADMIN — CLINDAMYCIN PHOSPHATE 600 MG: 600 INJECTION, SOLUTION INTRAVENOUS at 10:32

## 2023-07-15 RX ADMIN — SODIUM CHLORIDE, PRESERVATIVE FREE: 5 INJECTION INTRAVENOUS at 13:31

## 2023-07-15 RX ADMIN — CLINDAMYCIN PHOSPHATE 600 MG: 600 INJECTION, SOLUTION INTRAVENOUS at 01:48

## 2023-07-15 RX ADMIN — DIPHENHYDRAMINE HCL 25 MG: 25 CAPSULE ORAL at 12:16

## 2023-07-15 ASSESSMENT — ACTIVITIES OF DAILY LIVING (ADL)
ADLS_ACUITY_SCORE: 30

## 2023-07-15 NOTE — PLAN OF CARE
Problem: Plan of Care - These are the overarching goals to be used throughout the patient stay.    Goal: Optimal Comfort and Wellbeing  Outcome: Progressing  Intervention: Monitor Pain and Promote Comfort  Flowsheets (Taken 7/15/2023 0647)  Pain Management Interventions: medication (see MAR)     Problem: Skin or Soft Tissue Infection  Goal: Absence of Infection Signs and Symptoms  Outcome: Progressing    Problem: Pain Acute  Goal: Optimal Pain Control and Function  Outcome: Progressing  Intervention: Develop Pain Management Plan  Recent Flowsheet Documentation  Taken 7/15/2023 0647 by Oscar Morfin RN  Pain Management Interventions: medication (see MAR)      Goal Outcome Evaluation:         Pt C/O 6/10 joint pain. PRN Tylenol given with relief. Also reported itchiness. PRN Hydroxyzine given. Makes needs known.

## 2023-07-15 NOTE — PLAN OF CARE
Problem: Plan of Care - These are the overarching goals to be used throughout the patient stay.    Goal: Plan of Care Review  Description: The Plan of Care Review/Shift note should be completed every shift.  The Outcome Evaluation is a brief statement about your assessment that the patient is improving, declining, or no change.  This information will be displayed automatically on your shift note.  Outcome: Progressing     Problem: Skin or Soft Tissue Infection  Goal: Absence of Infection Signs and Symptoms  Outcome: Progressing   Goal Outcome Evaluation    Itchy rash over body.  Hydroxyzine given at change of shift by day shift Nurse. Oral Prednisone given once. Benadryl given once this shift.  Patient now states itching is now intermittent and milder. Rashes still evident over body(face, back etc).  Left leg red,warm and edematous.  Patient currently receiving Clindamycin and Vancomycin.  Tylenol given once or left heel discomfort.  Dressing changed once per request.  Small amount of purulent drainage oozed from open area on left ankle.    Good appetite and good fluid intake.  I.V. Fluid NS @ 100/Hr.  Temp: 98.4  F (36.9  C) Temp src: Oral BP: (!) 142/88 Pulse: 77   Resp: 18 SpO2: 100 % O2 Device: None (Room air)

## 2023-07-15 NOTE — PROGRESS NOTES
[facility]    Medicine Progress Note - Hospitalist Service    Date of Admission:  7/13/2023    Assessment & Plan         Tyler Miller is a 42 year old male admitted on 7/13/2023. He was recently discharged from [facility] after being managed for Septic arthritis, Strep pyogenes bacteremia, S/p I&D L ankle and R hand 7/5, ID recommended Rocephin x6 weeks - 38 days. Patient arrives to triage from home with chief complaint of rash all over his body which started to develop on Tuesday this week. Patient has a PICC line as well, been receiving IV antibiotics at home.  History of gout and cellulitis. Patient's left foot and LLE is very reddened and swollen.  Patient subsequently came to the ER for further evaluation and management. Will hold off iv ceftriaxone and started on iv clindamycin, vancomycin due to concerns for drug allergy, hold off steroids till cleared by ID, consult ID, ortho.       7/14 :     Continue iv antibiotics per ID  On benadryl prn for rash    .  .    A/p :           Recently discharged from [facility] after being managed for Septic arthritis, Strep pyogenes bacteremia, S/p I&D L ankle and R hand 7/5, ID recommended Rocephin x6 weeks - 38 days    Skin rash     Tophaceous gout     He was recently discharged from [facility] after being managed for Septic arthritis, Strep pyogenes bacteremia, S/p I&D L ankle and R hand 7/5, ID recommended Rocephin x6 weeks - 38 days. Patient arrives to triage from home with chief complaint of rash all over his body which started to develop on Tuesday this week. Patient has a PICC line as well, been receiving IV antibiotics at home.  History of gout and cellulitis. Patient's left foot and LLE is very reddened and swollen.  Patient subsequently came to the ER for further evaluation and management. Will hold off iv ceftriaxone and started on iv clindamycin, vancomycin due to concerns for drug allergy, hold off steroids till cleared by ID, consult ID,  "ortho.        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  Colchicine  Indomethacin  Uloric        #chronic anemia  Monitor     # Chronic pain : on oxycodone     # Mild hyponatremia  # DONNA     2/2 dehydration, iv fluids     # Leucocytosis, mild, chronic            Diet: Regular Diet Adult    DVT Prophylaxis: Pneumatic Compression Devices  Senior Catheter: Not present  Lines: PRESENT             Cardiac Monitoring: None  Code Status: Full Code      Clinically Significant Risk Factors         # Hyponatremia: Lowest Na = 129 mmol/L in last 2 days, will monitor as appropriate   # Hypercalcemia: corrected calcium is >10.1, will monitor as appropriate    # Hypoalbuminemia: Lowest albumin = 3.2 g/dL at 7/13/2023  6:32 PM, will monitor as appropriate     # Hypertension: Noted on problem list        # Overweight: Estimated body mass index is 28.23 kg/m  as calculated from the following:    Height as of this encounter: 1.626 m (5' 4\").    Weight as of this encounter: 74.6 kg (164 lb 7.4 oz)., PRESENT ON ADMISSION          Disposition Plan      Expected Discharge Date: 07/16/2023                  Mark Jacques MD  Hospitalist Service  [facility]  Securely message with [employer] (more info)  Text page via [employer] Paging/Directory   ______________________________________________________________________        Physical Exam   Vital Signs: Temp: 98.6  F (37  C) Temp src: Oral BP: (!) 150/94 Pulse: 86   Resp: 18 SpO2: 98 % O2 Device: None (Room air)    Weight: 164 lbs 7.41 oz       GENERAL: The patient is not in any acute distressed. Awake and alert.  HEENT: Nonicteric sclerae, PERRLA, EOMI. Oropharynx clear. Moist mucous membranes. Conjunctivae appear well perfused.  HEART: Regular rate and rhythm without murmurs.  LUNGS: Clear to auscultation bilaterally. No wheezing or crackles.  ABDOMEN: Soft, positive bowel sounds, nontender.  SKIN: generalized rash, no excessive bruising, petechiae, " or purpura.  EXTREMITIES : no rashes, no swelling in legs.  NEUROLOGIC: conscious and oriented, follows commands, no obvious focal deficits.  ROS: All other systems negative        Medical Decision Making       60 MINUTES SPENT BY ME on the date of service doing chart review, history, exam, documentation & further activities per the note.  MANAGEMENT DISCUSSED with the following over the past 24 hours: rn, patient       Data     I have personally reviewed the following data over the past 24 hrs:    N/A  \   N/A   / N/A     N/A N/A N/A /  N/A   N/A N/A 1.12 \

## 2023-07-15 NOTE — PHARMACY-CONSULT NOTE
Pharmacy Vancomycin Note  Date of Service July 15, 2023  Patient's  1980   42 year old, male    Indication: Abscess  Day of Therapy: 3  Current vancomycin regimen:  1500 mg IV q24h  Current vancomycin monitoring method: AUC  Current vancomycin therapeutic monitoring goal: 400-600 mg*h/L    [employer] Prediction of Current Vancomycin Regimen  Loading dose: N/A  Regimen: 1500 mg IV every 24 hours.  Start time: 08:08 on 2023  Exposure target: AUC24 (range)400-600 mg/L.hr   AUC24,ss: 352 mg/L.hr  Probability of AUC24 > 400: 26 %  Ctrough,ss: 7.9 mg/L  Probability of Ctrough,ss > 20: 0 %  Probability of nephrotoxicity (Lodise YAMILE ): 5 %      Current estimated CrCl = Estimated Creatinine Clearance: 81.1 mL/min (based on SCr of 1.12 mg/dL).    Creatinine for last 3 days  2023:  6:32 PM Creatinine 1.22 mg/dL  2023:  7:39 AM Creatinine 1.08 mg/dL  7/15/2023:  7:25 AM Creatinine 1.12 mg/dL    Recent Vancomycin Levels (past 3 days)  7/15/2023:  7:25 AM Vancomycin 7.3 ug/mL    Vancomycin IV Administrations (past 72 hours)                   vancomycin (VANCOCIN) 1,500 mg in sodium chloride 0.9 % 250 mL intermittent infusion (mg) 1,500 mg New Bag 07/15/23 0808     1,500 mg New Bag 23 0848    vancomycin (VANCOCIN) 1,750 mg in sodium chloride 0.9 % 500 mL intermittent infusion (mg) 1,750 mg Given 23 1832                Nephrotoxins and other renal medications (From now, onward)    Start     Dose/Rate Route Frequency Ordered Stop    23 0800  vancomycin (VANCOCIN) 1,500 mg in sodium chloride 0.9 % 250 mL intermittent infusion         1,500 mg  over 90 Minutes Intravenous EVERY 24 HOURS 23 1836               Contrast Orders - past 72 hours (72h ago, onward)    None          Interpretation of levels and current regimen:  Vancomycin level is reflective of being less than AUC goal.    Has serum creatinine changed greater than 50% in last 72 hours: No    Urine output:  good urine  output    Renal Function: Stable    Insight[employer] Prediction of Planned New Vancomycin Regimen  Loading dose: N/A  Regimen: 1000 mg IV every 12 hours.  Start time: 20:00 on 07/15/2023  Exposure target: AUC24 (range)400-600 mg/L.hr   AUC24,ss: 469 mg/L.hr  Probability of AUC24 > 400: 79 %  Ctrough,ss: 13.9 mg/L  Probability of Ctrough,ss > 20: 8 %  Probability of nephrotoxicity (Lodise YAMILE 2009): 9 %      Plan:  1. Increase Dose to 1000 mq IV every 12 hr.  2. Vancomycin monitoring method: AUC  3. Vancomycin therapeutic monitoring goal: 400-600 mg*h/L  4. Pharmacy will check vancomycin levels as appropriate in 1-3 Days.  5. Serum creatinine levels will be ordered a minimum of twice weekly.    Minh Ellington RPH

## 2023-07-15 NOTE — PLAN OF CARE
Goal Outcome Evaluation:      Problem: Plan of Care - These are the overarching goals to be used throughout the patient stay.    Goal: Absence of Hospital-Acquired Illness or Injury  Intervention: Identify and Manage Fall Risk  Recent Flowsheet Documentation  Taken 7/15/2023 0800 by Tamara Jones RN  Safety Promotion/Fall Prevention:    activity supervised    safety round/check completed    nonskid shoes/slippers when out of bed    clutter free environment maintained     Problem: Pain Acute  Goal: Optimal Pain Control and Function  Outcome: Progressing   Pt appeared comfortable. Pt denies pain.    PRN Benadryl given for itchy.    Left ankle dressing change done; large purulent drainage.    Maintenance IV fluid NS running cont. at 100 ml/hr per order  Wife present most shift.

## 2023-07-15 NOTE — PROGRESS NOTES
"CLINICAL NUTRITION SERVICES - ASSESSMENT NOTE     Nutrition Prescription    RECOMMENDATIONS FOR MDs/PROVIDERS TO ORDER:      Malnutrition Status:    Moderate malnutrition in acute illness    Recommendations already ordered by Registered Dietitian (RD):  Ensure Enlive daily    Future/Additional Recommendations:       REASON FOR ASSESSMENT  Tyler Miller is a/an 42 year old male assessed by the dietitian for Admission Nutrition Risk Screen for positive    NUTRITION HISTORY  Met patient with assist of [employer] .  Patient with decreased po intake for a week or more with wt loss.  Patient with painful gout in hands and feet.       CURRENT NUTRITION ORDERS  Diet: Regular  Intake not recorded.  Patient ordering small meals.    LABS  Labs reviewed    MEDICATIONS  Medications reviewed    ANTHROPOMETRICS  Height: 162.6 cm (5' 4\")  Most Recent Weight: 74.6 kg (164 lb 7.4 oz)    BMI: Overweight BMI 25-29.9  Weight History:   Wt Readings from Last 8 Encounters:   07/14/23 74.6 kg (164 lb 7.4 oz)   07/10/23 77.7 kg (171 lb 4.8 oz)   03/29/21 91.2 kg (201 lb 1.3 oz)   4.6% loss in 4 days.    Dosing Weight: 74.6 kg    ASSESSED NUTRITION NEEDS  Estimated Energy Needs: 1500+ kcals/day (20+ kcals/kg)  Justification: Maintenance and Overweight  Estimated Protein Needs: 75 grams protein/day (1 grams of pro/kg)  Justification: Increased needs  Estimated Fluid Needs: 1865+ mL/day (25+ mL/kg)   Justification: Increased needs    PHYSICAL FINDINGS  See malnutrition section below.  Gouty joints in hands, feet  Body rash   Last BM preadmit      MALNUTRITION:  % Weight Loss:  > 2% in 1 week (severe malnutrition)  % Intake:  <75% for > 7 days (moderate malnutrition)  Subcutaneous Fat Loss:  None observed  Muscle Loss:  None observed  Fluid Retention:  None noted    Malnutrition Diagnosis: Moderate malnutrition  In Context of:  Acute illness or injury    NUTRITION DIAGNOSIS  Malnutrition related to acute illness as evidenced by wt loss, " decreased intake.      INTERVENTIONS  Implementation  Nutrition Education: patient on general diet.   Medical food supplement therapy     Goals  Maintain wt.  Patient to consume % of nutritionally adequate meals three times per day, or the equivalent with supplements/snacks.     Monitoring/Evaluation  Progress toward goals will be monitored and evaluated per protocol.

## 2023-07-15 NOTE — PROGRESS NOTES
"Orthopedic Progress Note      Assessment:    Left ankle tophaceous gout    Plan:   -Patient was continued drainage from left lateral ankle portal incision, appears tophaceous not purulent  -Patient adamant about discharging without additional surgical procedure  -Medical management per hospitalist team  -Anticipate outpatient antibiotics  -Follow-up with Dr. Gonzalez on Monday, 7/17/2023      Subjective:    No acute events overnight.  Denies fevers, chills, sweats.  Continues to have a rash, but believes this has improved after stopping medicine.  Denies any pain in the ankle.      Objective:  BP (!) 150/94 (BP Location: Left arm)   Pulse 86   Temp 98.6  F (37  C) (Oral)   Resp 18   Ht 1.626 m (5' 4\")   Wt 74.6 kg (164 lb 7.4 oz)   SpO2 98%   BMI 28.23 kg/m      Patient is A&Ox3, sitting up in bed  Calves without tenderness, neg Krista's  Brisk capillary refill in the toes.   Palpable Left dorsalis pedis pulse. Left foot warm & well-perfused.  Sensation is intact to light touch & equal bilaterally in the femoral, DP, SP & tibial nerve distributions.  TTP of dorsal foot  Ankle DF/PF/inversion/eversion without pain.  TIB ANT, PF, EHL  5/5  Tophaceous drainage from lateral to his portal incision    Contralateral side= Full range of motion, Negative joint instability findings, 5/5 motor groups about the joint, Non-tender.       Imaging  None    Pertinent Labs   Lab Results: personally reviewed.   Lab Results   Component Value Date    INR 1.09 07/13/2023     Lab Results   Component Value Date    WBC 12.9 (H) 07/14/2023    HGB 9.2 (L) 07/14/2023    HCT 29.8 (L) 07/14/2023    MCV 69 (L) 07/14/2023     (H) 07/14/2023     Lab Results   Component Value Date     (L) 07/14/2023    CO2 16 (L) 07/14/2023         Pradip Montero MD  [facility]  "

## 2023-07-15 NOTE — PROGRESS NOTES
[facility]    Infectious Disease Progress Note     07/15/2023     Assessment & Plan   Tyler Miller is a 42 year old male who was admitted on 7/13/2023.     ASSESSMENT:  1. Rash improving slightly-diffuse- does not seem to be resolving  2. Streptococcal coccus group A bacteremia, septic arthritis, gout discharged on ceftriaxone.  Ceftriaxone has been stopped and patient switched to vancomycin and clindamycin. Itching and rash present  3. 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  Patient declining repeat surgical procedure at this time                      RECOMMENDATIONS:    1. Antibiotics:  Vancomycin stopped on 7/15/2023. Started Meropenem  2. Micfungin  3. Prednisone, Benadryl, topical triamcinolone  4. Stopped Clindamycin on 7/15/2023   5. Follow culture results   6. Focus/de-escalate antibiotics based on final culture results-Louise discharge on IV vancomycin for 4 weeks- discharge orders enered  7. Monitor CBC, CMP  8. Discussed with patient  9. Orthopedic surgery notes reviewed  10. Patient will remain hospitalized for at least another 48 hours until clinical improvement        Kristyn Black MD  [facility]  [phone]      Interval History   Rash worse today, itching  Received vancomycin    Previous note  Probably better, rash slightly less erythematous though still diffuse and present    Physical Exam   Temp: 98.3  F (36.8  C) Temp src: Oral BP: 124/86 Pulse: 82   Resp: 18 SpO2: 97 % O2 Device: None (Room air)    Vitals:    07/13/23 1545 07/14/23 1415   Weight: 78 kg (172 lb) 74.6 kg (164 lb 7.4 oz)     Vital Signs with Ranges  Temp:  [98.1  F (36.7  C)-99.3  F (37.4  C)] 98.3  F (36.8  C)  Pulse:  [77-86] 82  Resp:  [15-18] 18  BP: (124-150)/(86-94) 124/86  SpO2:  [97 %-100 %] 97 %    Constitutional: Awake, no apparent distress  Lungs: normal breathing pattern, no crackles or wheezing  Cardiovascular: Regular rate and rhythm, S1 S2  Abdomen: non  distended  Skin: warm Fuhs rash, less erythematous than yesterday  Neuro: deconditioned  Close girdle: Multiple areas of tophaceous gout, chalky drainage from ankle  Psych: able to answer questions    Medications     sodium chloride 100 mL/hr at 07/15/23 0730       clindamycin  600 mg Intravenous Q8H     sodium chloride (PF)  3 mL Intracatheter Q8H     vancomycin  1,000 mg Intravenous Q12H       Data   All microbiology laboratory data reviewed.  Recent Labs   Lab Test 07/14/23  0739 07/13/23  1832 07/11/23  1110   WBC 12.9* 15.4* 12.2*   HGB 9.2* 9.7* 9.5*   HCT 29.8* 31.1* 30.6*   MCV 69* 67* 68*   * 553* 564*     Recent Labs   Lab Test 07/15/23  0725 07/14/23  0739 07/13/23  1832   CR 1.12 1.08 1.22*     Recent Labs   Lab Test 07/11/23  1110   *     No lab results found.    Invalid input(s):     MICROBIOLOGY:    Reviewed    7-Day Micro Results     Collected Updated Procedure Result Status      07/13/2023 1748 07/14/2023 2101 Anaerobic Bacterial Culture Routine [[id number]]   Wound from Ankle, Left    Preliminary result Component Value   Culture No anaerobic organisms isolated after 1 day  [P]                07/13/2023 1748 07/15/2023 1027 Wound Aerobic Bacterial Culture Routine [[id number]]   Wound from Foot, Left    Final result Component Value   Culture No Growth               07/13/2023 1741 07/14/2023 2046 Blood Culture Peripheral Blood [[id number]]   Peripheral Blood    Preliminary result Component Value   Culture No growth after 1 day  [P]                07/13/2023 1741 07/14/2023 2046 Blood Culture Peripheral Blood [[id number]]   Peripheral Blood    Preliminary result Component Value   Culture No growth after 1 day  [P]                       RADIOLOGY:    Reviewed  MR Ankle Left w/o & w Contrast    Result Date: 7/9/2023  EXAM: MR ANKLE LEFT WITHOUT AND WITH CONTRAST, MR FOOT LEFT WITHOUT AND WITH CONTRAST LOCATION: [facility] DATE: 07/09/2023 INDICATION:  Assess for reaccumulation of abscess. Bacteremia and septic arthritis. COMPARISON: 07/04/2023. TECHNIQUE: Routine. Additional postgadolinium T1 sequences were obtained. IV CONTRAST: 8 mL Gadavist. FINDINGS: Again seen are extensive tophus formation with erosion in the forefoot and hindfoot compatible with gouty arthropathy. Superimposed infection within the areas of tophus formation is difficult to exclude by imaging as there is considerable surrounding soft tissue enhancement along the peripheral aspect of the tophus as well as extensive soft tissue edema and inflammation. Tophus formation is seen about the joints as well as within the subcutaneous soft tissues. There is tophus formation intermixed with complex fluid along the anterolateral aspect of the ankle superficial to the lateral ankle ligaments and fibula as seen on axial image 23. There is patchy bone marrow edema along portions of the talus, and calcaneus. Patchy bone marrow edema across the TMT joints. The fluid in the tibiotalar and subtalar joints has decreased, noting there is a small amount of residual fluid in the tibiotalar joint. There is multifocal tenosynovitis with moderate to severe tenosynovitis along the medial flexor tendons where there is complex fluid. There is no evidence of a drainable soft tissue abscess or definitive evidence of osteomyelitis. No significant joint effusion within the forefoot or midfoot. No acute tendon abnormality, with Achilles tendinosis again seen, with intratendinous tophus. There is chronic appearing osteonecrosis of the distal tibia. No definitive evidence of osteomyelitis. There is considerable dorsal subcutaneous edema and reticulation without a well-defined rim-enhancing dorsal fluid collection. This is presumably inflammatory or infectious.     IMPRESSION: 1.  Extensive tophus formation and soft tissue inflammation about the forefoot and hindfoot compatible with gouty arthropathy. Superimposed infection  within the areas of tophus formation is difficult to exclude by imaging. 2.  The amount of joint fluid in the tibiotalar and subtalar joints has decreased from the prior exam, with a small amount of residual versus recurrent fluid. 3.  Multifocal tenosynovitis, most pronounced along the medial flexor tendons where there is complex tenosynovial fluid. 4.  Along the anterolateral ankle superficial to the lateral ankle ligaments and fibula, there is tophus formation intermixed with superimposed poorly defined defined fluid along the superficial aspect of the tophus. Infection at this site is difficult to exclude. 5.  No definitive evidence of osteomyelitis, noting there is reactive bone marrow edema primarily in the hindfoot and ankle. 6.   Chronic appearing osteonecrosis of the distal tibia.     MR Foot Left w/o & w Contrast    Result Date: 7/9/2023  EXAM: MR ANKLE LEFT WITHOUT AND WITH CONTRAST, MR FOOT LEFT WITHOUT AND WITH CONTRAST LOCATION: [facility] DATE: 07/09/2023 INDICATION: Assess for reaccumulation of abscess. Bacteremia and septic arthritis. COMPARISON: 07/04/2023. TECHNIQUE: Routine. Additional postgadolinium T1 sequences were obtained. IV CONTRAST: 8 mL Gadavist. FINDINGS: Again seen are extensive tophus formation with erosion in the forefoot and hindfoot compatible with gouty arthropathy. Superimposed infection within the areas of tophus formation is difficult to exclude by imaging as there is considerable surrounding soft tissue enhancement along the peripheral aspect of the tophus as well as extensive soft tissue edema and inflammation. Tophus formation is seen about the joints as well as within the subcutaneous soft tissues. There is tophus formation intermixed with complex fluid along the anterolateral aspect of the ankle superficial to the lateral ankle ligaments and fibula as seen on axial image 23. There is patchy bone marrow edema along portions of the talus, and  calcaneus. Patchy bone marrow edema across the TMT joints. The fluid in the tibiotalar and subtalar joints has decreased, noting there is a small amount of residual fluid in the tibiotalar joint. There is multifocal tenosynovitis with moderate to severe tenosynovitis along the medial flexor tendons where there is complex fluid. There is no evidence of a drainable soft tissue abscess or definitive evidence of osteomyelitis. No significant joint effusion within the forefoot or midfoot. No acute tendon abnormality, with Achilles tendinosis again seen, with intratendinous tophus. There is chronic appearing osteonecrosis of the distal tibia. No definitive evidence of osteomyelitis. There is considerable dorsal subcutaneous edema and reticulation without a well-defined rim-enhancing dorsal fluid collection. This is presumably inflammatory or infectious.     IMPRESSION: 1.  Extensive tophus formation and soft tissue inflammation about the forefoot and hindfoot compatible with gouty arthropathy. Superimposed infection within the areas of tophus formation is difficult to exclude by imaging. 2.  The amount of joint fluid in the tibiotalar and subtalar joints has decreased from the prior exam, with a small amount of residual versus recurrent fluid. 3.  Multifocal tenosynovitis, most pronounced along the medial flexor tendons where there is complex tenosynovial fluid. 4.  Along the anterolateral ankle superficial to the lateral ankle ligaments and fibula, there is tophus formation intermixed with superimposed poorly defined defined fluid along the superficial aspect of the tophus. Infection at this site is difficult to exclude. 5.  No definitive evidence of osteomyelitis, noting there is reactive bone marrow edema primarily in the hindfoot and ankle. 6.   Chronic appearing osteonecrosis of the distal tibia.     POC US Guidance Needle Placement    Result Date: 7/5/2023  Ultrasound was performed as guidance to an anesthesia  "procedure.  Click \"PACS images\" hyperlink below to view any stored images.  For specific procedure details, view procedure note authored by anesthesia.    US Joint Injection Aspiration Major Left    Result Date: 7/4/2023  EXAM: 1. PUNCTURE AND ASPIRATION LEFT ANKLE JOINT 2. ULTRASOUND GUIDANCE LOCATION: [facility] DATE: 7/4/2023 INDICATION: left ankle  known gout, also Strep bacteremia, evaluate for septic arthritis. PROCEDURE: Informed consent obtained. Time out performed. The site was prepped and draped in sterile fashion. 10 mL of 1% lidocaine was infused into the local soft tissues. Under direct ultrasound guidance, a 22 gauge needle was placed into the fluid pocket and 5 ml of brown, cloudy fluid was aspirated. This was sent for cultures.     IMPRESSION: 1.  Status post ultrasound-guided puncture and aspiration of the left ankle joint.     MR Foot Left w/o & w Contrast    Result Date: 7/4/2023  EXAM: MR FOOT LEFT W/O and W CONTRAST, MR ANKLE LEFT W/O and W CONTRAST LOCATION: [facility] DATE: 7/4/2023 INDICATION: left ankle pain, erosion of 2nd MT and phalanx, RO septic arthritis, gout, vs osteo COMPARISON: None. TECHNIQUE: Routine. Additional postgadolinium T1 sequences were obtained. IV CONTRAST: 8ml Gadavist FINDINGS: JOINTS AND BONES: -Extensive erosive osseous changes are seen in the left foot and ankle most pronounced in the first MTP joint as well as the second metatarsal head with additional erosive foci are seen in the midfoot, second mid and distal phalanx, with large proliferative erosive changes seen in the posterior subtalar joints and posterior aspect of the talar. These proliferative erosive soft tissue masses are relatively hypointense on T1 and isointense on T2-weighted heterogeneous enhancement on postcontrast  imaging. The bone marrow adjacent to the erosive masses is not significantly edematous or enhancing. TENDONS: -There is mild thickening " and increased T2 signal of the distal aspect of the Achilles tendon (image 12, series 12). LIGAMENTS: -Lisfranc ligament: Intact. No subluxation. MUSCLES AND SOFT TISSUES: -Extensive large soft tissue masses is are seen in throughout the forefoot and hindfoot, which are low on T1 signal and intermediate T2 signal with variable postcontrast enhancement.     IMPRESSION: 1.  Multifocal extensive large soft tissue masses with underlying osseous erosion seen in the forefoot and hindfoot, most pronounced at the first MTP joint and second metatarsal head as well as in the posterior aspect of the ankle joint, favored reflect large gouty tophi given the patient's history and enhancement characteristics. 2.  No evidence of drainable soft tissue abscess or definitive evidence of osteomyelitis is seen. 3.  Mild Achilles tendinosis    MR Ankle Left w/o & w Contrast    Result Date: 7/4/2023  EXAM: MR FOOT LEFT W/O and W CONTRAST, MR ANKLE LEFT W/O and W CONTRAST LOCATION: [facility] DATE: 7/4/2023 INDICATION: left ankle pain, erosion of 2nd MT and phalanx, RO septic arthritis, gout, vs osteo COMPARISON: None. TECHNIQUE: Routine. Additional postgadolinium T1 sequences were obtained. IV CONTRAST: 8ml Gadavist FINDINGS: JOINTS AND BONES: -Extensive erosive osseous changes are seen in the left foot and ankle most pronounced in the first MTP joint as well as the second metatarsal head with additional erosive foci are seen in the midfoot, second mid and distal phalanx, with large proliferative erosive changes seen in the posterior subtalar joints and posterior aspect of the talar. These proliferative erosive soft tissue masses are relatively hypointense on T1 and isointense on T2-weighted heterogeneous enhancement on postcontrast  imaging. The bone marrow adjacent to the erosive masses is not significantly edematous or enhancing. TENDONS: -There is mild thickening and increased T2 signal of the distal aspect of  the Achilles tendon (image 12, series 12). LIGAMENTS: -Lisfranc ligament: Intact. No subluxation. MUSCLES AND SOFT TISSUES: -Extensive large soft tissue masses is are seen in throughout the forefoot and hindfoot, which are low on T1 signal and intermediate T2 signal with variable postcontrast enhancement.     IMPRESSION: 1.  Multifocal extensive large soft tissue masses with underlying osseous erosion seen in the forefoot and hindfoot, most pronounced at the first MTP joint and second metatarsal head as well as in the posterior aspect of the ankle joint, favored reflect large gouty tophi given the patient's history and enhancement characteristics. 2.  No evidence of drainable soft tissue abscess or definitive evidence of osteomyelitis is seen. 3.  Mild Achilles tendinosis    XR Foot 3 Views Standing Bilateral    Result Date: 7/3/2023  EXAM: XR FOOT 3 VIEWS STANDING BILATERAL LOCATION: [facility] DATE: 7/3/2023 INDICATION: Chronic bilateral foot wounds. Evaluate osteomyelitis or occult pathology. History of gout. COMPARISON: None.     IMPRESSION: Marked erosive/destructive changes involving the right second MTP joint with extensive destruction of the second metatarsal head and neck as well as the majority of the proximal middle phalanges most likely secondary to gout with marked surrounding soft tissue swelling. Mild periarticular erosive changes involving the right third and fourth TMT and third MTP joint. In the left foot, there are marked periarticular erosive changes involving the MTP joint of the great toe with marked surrounding soft tissue swelling most consistent with gout. Mild periarticular erosive changes involving the second MTP and third TMT joints.     XR Finger Right G/E 2 Views    Result Date: 7/3/2023  EXAM: XR FINGER RIGHT G/E 2 VIEWS LOCATION: [facility] DATE: 7/3/2023 INDICATION: right thumb pain, h o gout COMPARISON: 03/29/2021     IMPRESSION:  Marked soft tissue swelling of the proximal soft tissues of the thumb with periarticular osteopenia which likely reflect the sequela of patient's known gout. There is slight subluxation of the distal phalanx of the thumb relative to the proximal phalanx, though no evidence of a displaced fracture or dislocation.     XR Ankle Left G/E 3 Views    Result Date: 7/3/2023  EXAM: XR ANKLE LEFT G/E 3 VIEWS LOCATION: [facility] DATE: 7/3/2023 INDICATION: left ankle pain, h o gout COMPARISON: None.     IMPRESSION: No acute fracture or erosion. No foreign body.       Total Time Spent 50 minutes with >50% of the time spent in chart review, evaluation, management, counseling, education and care coordination.

## 2023-07-16 LAB
ALBUMIN SERPL BCG-MCNC: 3.1 G/DL (ref 3.5–5.2)
ALP SERPL-CCNC: 204 U/L (ref 40–129)
ALT SERPL W P-5'-P-CCNC: 39 U/L (ref 0–70)
ANION GAP SERPL CALCULATED.3IONS-SCNC: 12 MMOL/L (ref 7–15)
AST SERPL W P-5'-P-CCNC: 28 U/L (ref 0–45)
BILIRUB SERPL-MCNC: 0.4 MG/DL
BUN SERPL-MCNC: 12.8 MG/DL (ref 6–20)
CALCIUM SERPL-MCNC: 9.1 MG/DL (ref 8.6–10)
CHLORIDE SERPL-SCNC: 101 MMOL/L (ref 98–107)
CREAT SERPL-MCNC: 0.99 MG/DL (ref 0.67–1.17)
DEPRECATED HCO3 PLAS-SCNC: 20 MMOL/L (ref 22–29)
GFR SERPL CREATININE-BSD FRML MDRD: >90 ML/MIN/1.73M2
GLUCOSE SERPL-MCNC: 73 MG/DL (ref 70–99)
HOLD SPECIMEN: NORMAL
POTASSIUM SERPL-SCNC: 3.5 MMOL/L (ref 3.4–5.3)
PROT SERPL-MCNC: 8 G/DL (ref 6.4–8.3)
SODIUM SERPL-SCNC: 133 MMOL/L (ref 136–145)

## 2023-07-16 PROCEDURE — 258N000003 HC RX IP 258 OP 636: Performed by: INTERNAL MEDICINE

## 2023-07-16 PROCEDURE — 99232 SBSQ HOSP IP/OBS MODERATE 35: CPT | Performed by: INTERNAL MEDICINE

## 2023-07-16 PROCEDURE — 250N000011 HC RX IP 250 OP 636: Mod: JZ | Performed by: INTERNAL MEDICINE

## 2023-07-16 PROCEDURE — 250N000013 HC RX MED GY IP 250 OP 250 PS 637: Performed by: INTERNAL MEDICINE

## 2023-07-16 PROCEDURE — 250N000012 HC RX MED GY IP 250 OP 636 PS 637: Performed by: INTERNAL MEDICINE

## 2023-07-16 PROCEDURE — 120N000001 HC R&B MED SURG/OB

## 2023-07-16 PROCEDURE — 36415 COLL VENOUS BLD VENIPUNCTURE: CPT | Performed by: INTERNAL MEDICINE

## 2023-07-16 PROCEDURE — 80053 COMPREHEN METABOLIC PANEL: CPT | Performed by: INTERNAL MEDICINE

## 2023-07-16 RX ORDER — LIDOCAINE 40 MG/G
CREAM TOPICAL
Status: DISCONTINUED | OUTPATIENT
Start: 2023-07-16 | End: 2023-07-17 | Stop reason: HOSPADM

## 2023-07-16 RX ORDER — PREDNISONE 20 MG/1
20 TABLET ORAL DAILY
Status: DISCONTINUED | OUTPATIENT
Start: 2023-07-17 | End: 2023-07-17 | Stop reason: HOSPADM

## 2023-07-16 RX ORDER — MEROPENEM 1 G/1
1 INJECTION, POWDER, FOR SOLUTION INTRAVENOUS EVERY 8 HOURS
Qty: 90 EACH | Refills: 0
Start: 2023-07-16 | End: 2023-07-17

## 2023-07-16 RX ADMIN — SODIUM CHLORIDE, PRESERVATIVE FREE: 5 INJECTION INTRAVENOUS at 17:30

## 2023-07-16 RX ADMIN — DIPHENHYDRAMINE HCL 25 MG: 25 CAPSULE ORAL at 16:00

## 2023-07-16 RX ADMIN — MEROPENEM 1 G: 1 INJECTION, POWDER, FOR SOLUTION INTRAVENOUS at 09:12

## 2023-07-16 RX ADMIN — TRIAMCINOLONE ACETONIDE: 1 CREAM TOPICAL at 21:37

## 2023-07-16 RX ADMIN — MICAFUNGIN SODIUM 100 MG: 50 INJECTION, POWDER, LYOPHILIZED, FOR SOLUTION INTRAVENOUS at 17:24

## 2023-07-16 RX ADMIN — MEROPENEM 1 G: 1 INJECTION, POWDER, FOR SOLUTION INTRAVENOUS at 15:59

## 2023-07-16 RX ADMIN — MEROPENEM 1 G: 1 INJECTION, POWDER, FOR SOLUTION INTRAVENOUS at 00:34

## 2023-07-16 RX ADMIN — HYDROXYZINE HYDROCHLORIDE 50 MG: 50 TABLET ORAL at 00:34

## 2023-07-16 RX ADMIN — DIPHENHYDRAMINE HCL 25 MG: 25 CAPSULE ORAL at 00:34

## 2023-07-16 RX ADMIN — ACETAMINOPHEN 650 MG: 325 TABLET, FILM COATED ORAL at 05:54

## 2023-07-16 RX ADMIN — DIPHENHYDRAMINE HCL 25 MG: 25 CAPSULE ORAL at 09:11

## 2023-07-16 RX ADMIN — SODIUM CHLORIDE, PRESERVATIVE FREE: 5 INJECTION INTRAVENOUS at 12:06

## 2023-07-16 RX ADMIN — PREDNISONE 40 MG: 20 TABLET ORAL at 09:11

## 2023-07-16 RX ADMIN — TRIAMCINOLONE ACETONIDE: 1 CREAM TOPICAL at 09:12

## 2023-07-16 RX ADMIN — HYDROXYZINE HYDROCHLORIDE 50 MG: 50 TABLET ORAL at 15:14

## 2023-07-16 RX ADMIN — HYDROXYZINE HYDROCHLORIDE 50 MG: 50 TABLET ORAL at 05:54

## 2023-07-16 RX ADMIN — SODIUM CHLORIDE, PRESERVATIVE FREE: 5 INJECTION INTRAVENOUS at 00:35

## 2023-07-16 ASSESSMENT — ACTIVITIES OF DAILY LIVING (ADL)
ADLS_ACUITY_SCORE: 31
ADLS_ACUITY_SCORE: 31
ADLS_ACUITY_SCORE: 30
ADLS_ACUITY_SCORE: 30
ADLS_ACUITY_SCORE: 31
ADLS_ACUITY_SCORE: 31
ADLS_ACUITY_SCORE: 30
ADLS_ACUITY_SCORE: 31
ADLS_ACUITY_SCORE: 30
ADLS_ACUITY_SCORE: 31
ADLS_ACUITY_SCORE: 30
ADLS_ACUITY_SCORE: 31

## 2023-07-16 NOTE — PROGRESS NOTES
[facility]    Medicine Progress Note - Hospitalist Service    Date of Admission:  7/13/2023    Assessment & Plan         Tyler Miller is a 42 year old male admitted on 7/13/2023. He was recently discharged from [facility] after being managed for Septic arthritis, Strep pyogenes bacteremia, S/p I&D L ankle and R hand 7/5, ID recommended Rocephin x6 weeks - 38 days. Patient arrives to triage from home with chief complaint of rash all over his body which started to develop on Tuesday this week. Patient has a PICC line as well, been receiving IV antibiotics at home.  History of gout and cellulitis. Patient's left foot and LLE is very reddened and swollen.  Patient subsequently came to the ER for further evaluation and management. Will hold off iv ceftriaxone and started on iv clindamycin, vancomycin due to concerns for drug allergy, hold off steroids till cleared by ID, consult ID, ortho.       7/15 :     Continue iv antibiotics per ID  On benadryl prn for rash    .  .    A/p :           Recently discharged from [facility] after being managed for Septic arthritis, Strep pyogenes bacteremia, S/p I&D L ankle and R hand 7/5, ID recommended Rocephin x6 weeks - 38 days    Skin rash     Tophaceous gout     He was recently discharged from [facility] after being managed for Septic arthritis, Strep pyogenes bacteremia, S/p I&D L ankle and R hand 7/5, ID recommended Rocephin x6 weeks - 38 days. Patient arrives to triage from home with chief complaint of rash all over his body which started to develop on Tuesday this week. Patient has a PICC line as well, been receiving IV antibiotics at home.  History of gout and cellulitis. Patient's left foot and LLE is very reddened and swollen.  Patient subsequently came to the ER for further evaluation and management. Will hold off iv ceftriaxone and started on iv clindamycin, vancomycin due to concerns for drug allergy, hold off steroids till cleared by ID, consult ID,  "ortho.        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  Colchicine  Indomethacin  Uloric        #chronic anemia  Monitor     # Chronic pain : on oxycodone     # Mild hyponatremia  # DONNA     2/2 dehydration, iv fluids     # Leucocytosis, mild, chronic            Diet: Regular Diet Adult  Snacks/Supplements Adult: Ensure Enlive; With Meals    DVT Prophylaxis: Pneumatic Compression Devices  Senior Catheter: Not present  Lines: PRESENT             Cardiac Monitoring: None  Code Status: Full Code      Clinically Significant Risk Factors              # Hypoalbuminemia: Lowest albumin = 3.1 g/dL at 7/16/2023  6:44 AM, will monitor as appropriate     # Hypertension: Noted on problem list        # Overweight: Estimated body mass index is 28.23 kg/m  as calculated from the following:    Height as of this encounter: 1.626 m (5' 4\").    Weight as of this encounter: 74.6 kg (164 lb 7.4 oz)., PRESENT ON ADMISSION  # Moderate Malnutrition: based on nutrition assessment, PRESENT ON ADMISSION        Disposition Plan     Expected Discharge Date: 07/16/2023                  Mark Jacques MD  Hospitalist Service  [facility]  Securely message with J[employer] (more info)  Text page via [facility] Paging/Directory   ______________________________________________________________________        Physical Exam   Vital Signs: Temp: 98.5  F (36.9  C) Temp src: Oral BP: (!) 166/101 Pulse: 88   Resp: 20 SpO2: 99 % O2 Device: None (Room air)    Weight: 164 lbs 7.41 oz       GENERAL: The patient is not in any acute distressed. Awake and alert.  HEENT: Nonicteric sclerae, PERRLA, EOMI. Oropharynx clear. Moist mucous membranes. Conjunctivae appear well perfused.  HEART: Regular rate and rhythm without murmurs.  LUNGS: Clear to auscultation bilaterally. No wheezing or crackles.  ABDOMEN: Soft, positive bowel sounds, nontender.  SKIN: generalized rash, no excessive bruising, petechiae, or purpura.  EXTREMITIES " : no rashes, no swelling in legs.  NEUROLOGIC: conscious and oriented, follows commands, no obvious focal deficits.  ROS: All other systems negative        Medical Decision Making       60 MINUTES SPENT BY ME on the date of service doing chart review, history, exam, documentation & further activities per the note.  MANAGEMENT DISCUSSED with the following over the past 24 hours: rn, patient       Data     I have personally reviewed the following data over the past 24 hrs:    N/A  \   N/A   / N/A     133 (L) 101 12.8 /  73   3.5 20 (L) 0.99 \       ALT: 39 AST: 28 AP: 204 (H) TBILI: 0.4   ALB: 3.1 (L) TOT PROTEIN: 8.0 LIPASE: N/A

## 2023-07-16 NOTE — PLAN OF CARE
Problem: Plan of Care - These are the overarching goals to be used throughout the patient stay.    Goal: Optimal Comfort and Wellbeing  Outcome: Progressing     Problem: Skin or Soft Tissue Infection  Goal: Absence of Infection Signs and Symptoms  Outcome: Progressing     Problem: Pain Acute  Goal: Optimal Pain Control and Function  Outcome: Progressing   Goal Outcome Evaluation:       Denied pain overnight. C/O continued itching overnight. Scheduled benadryl given at HS. PRN 50 mg hydroxyzine also given x2; effective. While pt states the medication is effective, he doesn't think it lasts very long. Encouraged pt to continue use of PRN medications. Applied ice packs to rashes; effective. Slept intermittently.

## 2023-07-16 NOTE — PLAN OF CARE
Problem: Plan of Care - These are the overarching goals to be used throughout the patient stay.    Goal: Absence of Hospital-Acquired Illness or Injury  Intervention: Identify and Manage Fall Risk  Recent Flowsheet Documentation  Safety Promotion/Fall Prevention: activity supervised  Intervention: Prevent Skin Injury  Recent Flowsheet Documentation  Body Position: position changed independently   Goal Outcome Evaluation:    Benadryl given for generalized itching.  Less itching than yesterday per Patient.  Triamcinolone cream applied to rash.  Antibiotics per order.  I.V.   Wound dressing remains CDI.  Cont to monitor.

## 2023-07-16 NOTE — PROGRESS NOTES
"Orthopedic Progress Note      Assessment:    Left ankle tophaceous gout    Plan:   -Patient has continued drainage from left lateral ankle portal incision. Agree with Dr. Montero that it appears tophaceous not purulent.   -Patient adamant about discharging without additional surgical procedure. Voiced this again today.   -Medical management per hospitalist team  -Anticipate outpatient antibiotics  -Follow-up with Dr. Gonzalez on Monday, 7/17/2023      Subjective:    No acute events overnight.  Denies fevers, chills, sweats.  Continues to have a rash, but believes this has improved after stopping medicine.  Denies any pain in the ankle.      Objective:  BP (!) 166/101 (BP Location: Left arm)   Pulse 88   Temp 98.5  F (36.9  C) (Oral)   Resp 20   Ht 1.626 m (5' 4\")   Wt 74.6 kg (164 lb 7.4 oz)   SpO2 99%   BMI 28.23 kg/m      Patient is A&Ox3, sitting up in bed  Calves without tenderness, neg Krista's  Brisk capillary refill in the toes.   Palpable Left dorsalis pedis pulse. Left foot warm & well-perfused.  Sensation is intact to light touch & equal bilaterally in the femoral, DP, SP & tibial nerve distributions.  TTP of dorsal foot  Ankle DF/PF/inversion/eversion without pain.  TIB ANT, PF, EHL  5/5  Tophaceous drainage from lateral to his portal incision. Minimal today.     Contralateral side= Full range of motion, Negative joint instability findings, 5/5 motor groups about the joint, Non-tender.       Imaging  None    Pertinent Labs   Lab Results: personally reviewed.   Lab Results   Component Value Date    INR 1.09 07/13/2023     Lab Results   Component Value Date    WBC 8.6 07/15/2023    HGB 8.8 (L) 07/15/2023    HCT 29.6 (L) 07/15/2023    MCV 70 (L) 07/15/2023     (H) 07/15/2023     Lab Results   Component Value Date     (L) 07/16/2023    CO2 20 (L) 07/16/2023         Emely Davis PA-C  [facility]  "

## 2023-07-16 NOTE — PLAN OF CARE
Goal Outcome Evaluation:      Problem: Plan of Care - These are the overarching goals to be used throughout the patient stay.    Goal: Absence of Hospital-Acquired Illness or Injury  Intervention: Identify and Manage Fall Risk  Recent Flowsheet Documentation  Taken 7/16/2023 0900 by Tamara Jones RN  Safety Promotion/Fall Prevention:    activity supervised    assistive device/personal items within reach    clutter free environment maintained    nonskid shoes/slippers when out of bed    patient and family education    safety round/check completed     Problem: Pain Acute  Goal: Optimal Pain Control and Function  Outcome: Progressing  Pt denies pain.    PRN hydroxyzine given for itchy    PICC dressing change done: pt tolerated well.

## 2023-07-16 NOTE — PROGRESS NOTES
[facility]    Infectious Disease Progress Note     07/16/2023     Assessment & Plan   Tyler Miller is a 42 year old male who was admitted on 7/13/2023.     ASSESSMENT:  1. Rash improving slightly-diffuse- does not seem to be resolving  2. Streptococcal coccus group A bacteremia, septic arthritis, gout discharged on ceftriaxone.  Ceftriaxone has been stopped and patient switched to vancomycin and clindamycin. Itching and rash present  3. 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  Patient declining repeat surgical procedure at this time                      RECOMMENDATIONS:    1. Antibiotics:  Vancomycin stopped on 7/15/2023. Started Meropenem-tolerating and rash improved  2. Micfungin while inpatient  3. Prednisone, Benadryl, topical triamcinolone  4. Stopped Clindamycin on 7/15/2023   5. Follow culture results   6. Focus/de-escalate antibiotics based on final culture results discharge on IV ertapenem for 4 weeks- discharge orders enered  7. Monitor CBC, CMP  8. Discussed with patient  9. Orthopedic surgery notes reviewed  10. Patient will remain hospitalized for at least another 24 hours until clinical improvement.  Patient would like to be discharged tomorrow if possible  11. ID follow-up with me in 2 to 4 weeks in clinic  12. Patient and patient's wife updated.  ID will follow tomorrow        Kristyn Black MD  [facility]  [phone]      Interval History   Rash improved, itching less  Less erythematous  Tolerated meropenem    Updated patient and patient's wife regarding antibiotic plan    Previous note  Probably better, rash slightly less erythematous though still diffuse and present    Physical Exam   Temp: 98.5  F (36.9  C) Temp src: Oral BP: (!) 166/101 Pulse: 88   Resp: 20 SpO2: 99 % O2 Device: None (Room air)    Vitals:    07/13/23 1545 07/14/23 1415   Weight: 78 kg (172 lb) 74.6 kg (164 lb 7.4 oz)     Vital Signs with Ranges  Temp:  [98  F (36.7   C)-98.7  F (37.1  C)] 98.5  F (36.9  C)  Pulse:  [76-88] 88  Resp:  [16-20] 20  BP: (124-166)/() 166/101  SpO2:  [97 %-100 %] 99 %    Constitutional: Awake, no apparent distress  Lungs: normal breathing pattern, no crackles or wheezing  Cardiovascular: Regular rate and rhythm, S1 S2  Abdomen: non distended  Skin: warm  rash, less erythematous than yesterday  Neuro: deconditioned  Close girdle: Multiple areas of tophaceous gout, chalky drainage from ankle  Psych: able to answer questions    Medications     sodium chloride 100 mL/hr at 07/16/23 0730       diphenhydrAMINE  25 mg Oral Q8H     meropenem  1 g Intravenous Q8H     micafungin  100 mg Intravenous Q24H     sodium chloride (PF)  3 mL Intracatheter Q8H     triamcinolone   Topical BID       Data   All microbiology laboratory data reviewed.  Recent Labs   Lab Test 07/15/23  0725 07/14/23  0739 07/13/23  1832   WBC 8.6 12.9* 15.4*   HGB 8.8* 9.2* 9.7*   HCT 29.6* 29.8* 31.1*   MCV 70* 69* 67*   * 559* 553*     Recent Labs   Lab Test 07/16/23  0644 07/15/23  0725 07/14/23  0739   CR 0.99 1.13  1.12 1.08     Recent Labs   Lab Test 07/11/23  1110   *     No lab results found.    Invalid input(s):     MICROBIOLOGY:    Reviewed    7-Day Micro Results     Collected Updated Procedure Result Status      07/13/2023 1748 07/15/2023 2101 Anaerobic Bacterial Culture Routine [[id number]]   Wound from Ankle, Left    Preliminary result Component Value   Culture No anaerobic organisms isolated after 2 days  [P]                07/13/2023 1748 07/15/2023 1027 Wound Aerobic Bacterial Culture Routine [[id number]]   Wound from Foot, Left    Final result Component Value   Culture No Growth               07/13/2023 1741 07/15/2023 2047 Blood Culture Peripheral Blood [[id number]]   Peripheral Blood    Preliminary result Component Value   Culture No growth after 2 days  [P]                07/13/2023 1741 07/15/2023 2047 Blood Culture Peripheral Blood  [[id number]]   Peripheral Blood    Preliminary result Component Value   Culture No growth after 2 days  [P]                       RADIOLOGY:    Reviewed  MR Ankle Left w/o & w Contrast    Result Date: 7/9/2023  EXAM: MR ANKLE LEFT WITHOUT AND WITH CONTRAST, MR FOOT LEFT WITHOUT AND WITH CONTRAST LOCATION: [facility] DATE: 07/09/2023 INDICATION: Assess for reaccumulation of abscess. Bacteremia and septic arthritis. COMPARISON: 07/04/2023. TECHNIQUE: Routine. Additional postgadolinium T1 sequences were obtained. IV CONTRAST: 8 mL Gadavist. FINDINGS: Again seen are extensive tophus formation with erosion in the forefoot and hindfoot compatible with gouty arthropathy. Superimposed infection within the areas of tophus formation is difficult to exclude by imaging as there is considerable surrounding soft tissue enhancement along the peripheral aspect of the tophus as well as extensive soft tissue edema and inflammation. Tophus formation is seen about the joints as well as within the subcutaneous soft tissues. There is tophus formation intermixed with complex fluid along the anterolateral aspect of the ankle superficial to the lateral ankle ligaments and fibula as seen on axial image 23. There is patchy bone marrow edema along portions of the talus, and calcaneus. Patchy bone marrow edema across the TMT joints. The fluid in the tibiotalar and subtalar joints has decreased, noting there is a small amount of residual fluid in the tibiotalar joint. There is multifocal tenosynovitis with moderate to severe tenosynovitis along the medial flexor tendons where there is complex fluid. There is no evidence of a drainable soft tissue abscess or definitive evidence of osteomyelitis. No significant joint effusion within the forefoot or midfoot. No acute tendon abnormality, with Achilles tendinosis again seen, with intratendinous tophus. There is chronic appearing osteonecrosis of the distal tibia. No  definitive evidence of osteomyelitis. There is considerable dorsal subcutaneous edema and reticulation without a well-defined rim-enhancing dorsal fluid collection. This is presumably inflammatory or infectious.     IMPRESSION: 1.  Extensive tophus formation and soft tissue inflammation about the forefoot and hindfoot compatible with gouty arthropathy. Superimposed infection within the areas of tophus formation is difficult to exclude by imaging. 2.  The amount of joint fluid in the tibiotalar and subtalar joints has decreased from the prior exam, with a small amount of residual versus recurrent fluid. 3.  Multifocal tenosynovitis, most pronounced along the medial flexor tendons where there is complex tenosynovial fluid. 4.  Along the anterolateral ankle superficial to the lateral ankle ligaments and fibula, there is tophus formation intermixed with superimposed poorly defined defined fluid along the superficial aspect of the tophus. Infection at this site is difficult to exclude. 5.  No definitive evidence of osteomyelitis, noting there is reactive bone marrow edema primarily in the hindfoot and ankle. 6.   Chronic appearing osteonecrosis of the distal tibia.     MR Foot Left w/o & w Contrast    Result Date: 7/9/2023  EXAM: MR ANKLE LEFT WITHOUT AND WITH CONTRAST, MR FOOT LEFT WITHOUT AND WITH CONTRAST LOCATION: [facility] DATE: 07/09/2023 INDICATION: Assess for reaccumulation of abscess. Bacteremia and septic arthritis. COMPARISON: 07/04/2023. TECHNIQUE: Routine. Additional postgadolinium T1 sequences were obtained. IV CONTRAST: 8 mL Gadavist. FINDINGS: Again seen are extensive tophus formation with erosion in the forefoot and hindfoot compatible with gouty arthropathy. Superimposed infection within the areas of tophus formation is difficult to exclude by imaging as there is considerable surrounding soft tissue enhancement along the peripheral aspect of the tophus as well as extensive  soft tissue edema and inflammation. Tophus formation is seen about the joints as well as within the subcutaneous soft tissues. There is tophus formation intermixed with complex fluid along the anterolateral aspect of the ankle superficial to the lateral ankle ligaments and fibula as seen on axial image 23. There is patchy bone marrow edema along portions of the talus, and calcaneus. Patchy bone marrow edema across the TMT joints. The fluid in the tibiotalar and subtalar joints has decreased, noting there is a small amount of residual fluid in the tibiotalar joint. There is multifocal tenosynovitis with moderate to severe tenosynovitis along the medial flexor tendons where there is complex fluid. There is no evidence of a drainable soft tissue abscess or definitive evidence of osteomyelitis. No significant joint effusion within the forefoot or midfoot. No acute tendon abnormality, with Achilles tendinosis again seen, with intratendinous tophus. There is chronic appearing osteonecrosis of the distal tibia. No definitive evidence of osteomyelitis. There is considerable dorsal subcutaneous edema and reticulation without a well-defined rim-enhancing dorsal fluid collection. This is presumably inflammatory or infectious.     IMPRESSION: 1.  Extensive tophus formation and soft tissue inflammation about the forefoot and hindfoot compatible with gouty arthropathy. Superimposed infection within the areas of tophus formation is difficult to exclude by imaging. 2.  The amount of joint fluid in the tibiotalar and subtalar joints has decreased from the prior exam, with a small amount of residual versus recurrent fluid. 3.  Multifocal tenosynovitis, most pronounced along the medial flexor tendons where there is complex tenosynovial fluid. 4.  Along the anterolateral ankle superficial to the lateral ankle ligaments and fibula, there is tophus formation intermixed with superimposed poorly defined defined fluid along the superficial  "aspect of the tophus. Infection at this site is difficult to exclude. 5.  No definitive evidence of osteomyelitis, noting there is reactive bone marrow edema primarily in the hindfoot and ankle. 6.   Chronic appearing osteonecrosis of the distal tibia.     POC US Guidance Needle Placement    Result Date: 7/5/2023  Ultrasound was performed as guidance to an anesthesia procedure.  Click \"PACS images\" hyperlink below to view any stored images.  For specific procedure details, view procedure note authored by anesthesia.    US Joint Injection Aspiration Major Left    Result Date: 7/4/2023  EXAM: 1. PUNCTURE AND ASPIRATION LEFT ANKLE JOINT 2. ULTRASOUND GUIDANCE LOCATION: [facility] DATE: 7/4/2023 INDICATION: left ankle  known gout, also Strep bacteremia, evaluate for septic arthritis. PROCEDURE: Informed consent obtained. Time out performed. The site was prepped and draped in sterile fashion. 10 mL of 1% lidocaine was infused into the local soft tissues. Under direct ultrasound guidance, a 22 gauge needle was placed into the fluid pocket and 5 ml of brown, cloudy fluid was aspirated. This was sent for cultures.     IMPRESSION: 1.  Status post ultrasound-guided puncture and aspiration of the left ankle joint.     MR Foot Left w/o & w Contrast    Result Date: 7/4/2023  EXAM: MR FOOT LEFT W/O and W CONTRAST, MR ANKLE LEFT W/O and W CONTRAST LOCATION: [facility] DATE: 7/4/2023 INDICATION: left ankle pain, erosion of 2nd MT and phalanx, RO septic arthritis, gout, vs osteo COMPARISON: None. TECHNIQUE: Routine. Additional postgadolinium T1 sequences were obtained. IV CONTRAST: 8ml Gadavist FINDINGS: JOINTS AND BONES: -Extensive erosive osseous changes are seen in the left foot and ankle most pronounced in the first MTP joint as well as the second metatarsal head with additional erosive foci are seen in the midfoot, second mid and distal phalanx, with large proliferative erosive " changes seen in the posterior subtalar joints and posterior aspect of the talar. These proliferative erosive soft tissue masses are relatively hypointense on T1 and isointense on T2-weighted heterogeneous enhancement on postcontrast  imaging. The bone marrow adjacent to the erosive masses is not significantly edematous or enhancing. TENDONS: -There is mild thickening and increased T2 signal of the distal aspect of the Achilles tendon (image 12, series 12). LIGAMENTS: -Lisfranc ligament: Intact. No subluxation. MUSCLES AND SOFT TISSUES: -Extensive large soft tissue masses is are seen in throughout the forefoot and hindfoot, which are low on T1 signal and intermediate T2 signal with variable postcontrast enhancement.     IMPRESSION: 1.  Multifocal extensive large soft tissue masses with underlying osseous erosion seen in the forefoot and hindfoot, most pronounced at the first MTP joint and second metatarsal head as well as in the posterior aspect of the ankle joint, favored reflect large gouty tophi given the patient's history and enhancement characteristics. 2.  No evidence of drainable soft tissue abscess or definitive evidence of osteomyelitis is seen. 3.  Mild Achilles tendinosis    MR Ankle Left w/o & w Contrast    Result Date: 7/4/2023  EXAM: MR FOOT LEFT W/O and W CONTRAST, MR ANKLE LEFT W/O and W CONTRAST LOCATION: [facility] DATE: 7/4/2023 INDICATION: left ankle pain, erosion of 2nd MT and phalanx, RO septic arthritis, gout, vs osteo COMPARISON: None. TECHNIQUE: Routine. Additional postgadolinium T1 sequences were obtained. IV CONTRAST: 8ml Gadavist FINDINGS: JOINTS AND BONES: -Extensive erosive osseous changes are seen in the left foot and ankle most pronounced in the first MTP joint as well as the second metatarsal head with additional erosive foci are seen in the midfoot, second mid and distal phalanx, with large proliferative erosive changes seen in the posterior subtalar joints  and posterior aspect of the talar. These proliferative erosive soft tissue masses are relatively hypointense on T1 and isointense on T2-weighted heterogeneous enhancement on postcontrast  imaging. The bone marrow adjacent to the erosive masses is not significantly edematous or enhancing. TENDONS: -There is mild thickening and increased T2 signal of the distal aspect of the Achilles tendon (image 12, series 12). LIGAMENTS: -Lisfranc ligament: Intact. No subluxation. MUSCLES AND SOFT TISSUES: -Extensive large soft tissue masses is are seen in throughout the forefoot and hindfoot, which are low on T1 signal and intermediate T2 signal with variable postcontrast enhancement.     IMPRESSION: 1.  Multifocal extensive large soft tissue masses with underlying osseous erosion seen in the forefoot and hindfoot, most pronounced at the first MTP joint and second metatarsal head as well as in the posterior aspect of the ankle joint, favored reflect large gouty tophi given the patient's history and enhancement characteristics. 2.  No evidence of drainable soft tissue abscess or definitive evidence of osteomyelitis is seen. 3.  Mild Achilles tendinosis    XR Foot 3 Views Standing Bilateral    Result Date: 7/3/2023  EXAM: XR FOOT 3 VIEWS STANDING BILATERAL LOCATION: [facility] DATE: 7/3/2023 INDICATION: Chronic bilateral foot wounds. Evaluate osteomyelitis or occult pathology. History of gout. COMPARISON: None.     IMPRESSION: Marked erosive/destructive changes involving the right second MTP joint with extensive destruction of the second metatarsal head and neck as well as the majority of the proximal middle phalanges most likely secondary to gout with marked surrounding soft tissue swelling. Mild periarticular erosive changes involving the right third and fourth TMT and third MTP joint. In the left foot, there are marked periarticular erosive changes involving the MTP joint of the great toe with marked  surrounding soft tissue swelling most consistent with gout. Mild periarticular erosive changes involving the second MTP and third TMT joints.     XR Finger Right G/E 2 Views    Result Date: 7/3/2023  EXAM: XR FINGER RIGHT G/E 2 VIEWS LOCATION: [facility] DATE: 7/3/2023 INDICATION: right thumb pain, h o gout COMPARISON: 03/29/2021     IMPRESSION: Marked soft tissue swelling of the proximal soft tissues of the thumb with periarticular osteopenia which likely reflect the sequela of patient's known gout. There is slight subluxation of the distal phalanx of the thumb relative to the proximal phalanx, though no evidence of a displaced fracture or dislocation.     XR Ankle Left G/E 3 Views    Result Date: 7/3/2023  EXAM: XR ANKLE LEFT G/E 3 VIEWS LOCATION: [facility] DATE: 7/3/2023 INDICATION: left ankle pain, h o gout COMPARISON: None.     IMPRESSION: No acute fracture or erosion. No foreign body.     Total Time Spent 35 minutes with >50% of the time spent in chart review, evaluation, management, counseling, education and care coordination.

## 2023-07-16 NOTE — PROGRESS NOTES
Care Management Follow Up    Length of Stay (days): 3    Expected Discharge Date: 07/17/2023     Concerns to be Addressed:     Care Progression  Patient plan of care discussed at interdisciplinary rounds: Yes    Anticipated Discharge Disposition:  Home with home infusion     Anticipated Discharge Services:  Home infusion  Anticipated Discharge DME:  [facility]    Patient/family educated on Medicare website which has current facility and service quality ratings:  NA  Education Provided on the Discharge Plan:  NA  Patient/Family in Agreement with the Plan:  NA    Referrals Placed by CM/SW:  [facility]  Private pay costs discussed: Not applicable    Additional Information:  Chart reviewed.     Change in discharge IV abx plan. CM will follow up with home infusion on Monday and plan for discharge.     Social HX: Patient home independent with [facility] services for IV abx.    CM will continue to follow care progression and aide in discharge planning as needed.     Rebeca Manajrrez RN

## 2023-07-17 VITALS
WEIGHT: 164.46 LBS | BODY MASS INDEX: 28.08 KG/M2 | RESPIRATION RATE: 19 BRPM | TEMPERATURE: 98.8 F | HEART RATE: 79 BPM | HEIGHT: 64 IN | SYSTOLIC BLOOD PRESSURE: 139 MMHG | OXYGEN SATURATION: 100 % | DIASTOLIC BLOOD PRESSURE: 89 MMHG

## 2023-07-17 PROCEDURE — 250N000013 HC RX MED GY IP 250 OP 250 PS 637: Performed by: INTERNAL MEDICINE

## 2023-07-17 PROCEDURE — 99239 HOSP IP/OBS DSCHRG MGMT >30: CPT | Performed by: INTERNAL MEDICINE

## 2023-07-17 PROCEDURE — 250N000011 HC RX IP 250 OP 636: Mod: JZ

## 2023-07-17 PROCEDURE — 99232 SBSQ HOSP IP/OBS MODERATE 35: CPT

## 2023-07-17 PROCEDURE — 258N000003 HC RX IP 258 OP 636: Performed by: INTERNAL MEDICINE

## 2023-07-17 PROCEDURE — 250N000011 HC RX IP 250 OP 636: Mod: JZ | Performed by: INTERNAL MEDICINE

## 2023-07-17 PROCEDURE — 250N000012 HC RX MED GY IP 250 OP 636 PS 637: Performed by: INTERNAL MEDICINE

## 2023-07-17 RX ORDER — AMOXICILLIN 250 MG
2 CAPSULE ORAL 2 TIMES DAILY
Qty: 28 TABLET | Refills: 0 | Status: SHIPPED | OUTPATIENT
Start: 2023-07-17

## 2023-07-17 RX ORDER — ERTAPENEM 1 G/1
1 INJECTION, POWDER, LYOPHILIZED, FOR SOLUTION INTRAMUSCULAR; INTRAVENOUS
Status: DISCONTINUED | OUTPATIENT
Start: 2023-07-17 | End: 2023-07-17 | Stop reason: HOSPADM

## 2023-07-17 RX ORDER — ERTAPENEM 1 G/1
1 INJECTION, POWDER, LYOPHILIZED, FOR SOLUTION INTRAMUSCULAR; INTRAVENOUS EVERY 24 HOURS
Qty: 270 ML | Refills: 0
Start: 2023-07-17 | End: 2023-08-13

## 2023-07-17 RX ORDER — PREDNISONE 20 MG/1
20 TABLET ORAL DAILY
Qty: 5 TABLET | Refills: 0 | Status: SHIPPED | OUTPATIENT
Start: 2023-07-18 | End: 2023-07-23

## 2023-07-17 RX ORDER — PREDNISONE 20 MG/1
20 TABLET ORAL DAILY
Qty: 5 TABLET | Refills: 0 | Status: SHIPPED | OUTPATIENT
Start: 2023-07-18 | End: 2023-07-17

## 2023-07-17 RX ADMIN — HYDROXYZINE HYDROCHLORIDE 50 MG: 50 TABLET ORAL at 02:36

## 2023-07-17 RX ADMIN — ERTAPENEM SODIUM 1 G: 1 INJECTION, POWDER, LYOPHILIZED, FOR SOLUTION INTRAMUSCULAR; INTRAVENOUS at 15:32

## 2023-07-17 RX ADMIN — DIPHENHYDRAMINE HCL 25 MG: 25 CAPSULE ORAL at 00:33

## 2023-07-17 RX ADMIN — DIPHENHYDRAMINE HCL 25 MG: 25 CAPSULE ORAL at 15:30

## 2023-07-17 RX ADMIN — SODIUM CHLORIDE, PRESERVATIVE FREE: 5 INJECTION INTRAVENOUS at 12:49

## 2023-07-17 RX ADMIN — PREDNISONE 20 MG: 20 TABLET ORAL at 08:31

## 2023-07-17 RX ADMIN — SODIUM CHLORIDE, PRESERVATIVE FREE: 5 INJECTION INTRAVENOUS at 00:34

## 2023-07-17 RX ADMIN — DIPHENHYDRAMINE HCL 25 MG: 25 CAPSULE ORAL at 08:31

## 2023-07-17 RX ADMIN — MEROPENEM 1 G: 1 INJECTION, POWDER, FOR SOLUTION INTRAVENOUS at 08:33

## 2023-07-17 RX ADMIN — TRIAMCINOLONE ACETONIDE: 1 CREAM TOPICAL at 08:37

## 2023-07-17 RX ADMIN — MEROPENEM 1 G: 1 INJECTION, POWDER, FOR SOLUTION INTRAVENOUS at 00:33

## 2023-07-17 ASSESSMENT — ACTIVITIES OF DAILY LIVING (ADL)
ADLS_ACUITY_SCORE: 31
ADLS_ACUITY_SCORE: 30
ADLS_ACUITY_SCORE: 31
ADLS_ACUITY_SCORE: 31
ADLS_ACUITY_SCORE: 30
ADLS_ACUITY_SCORE: 31
ADLS_ACUITY_SCORE: 30
ADLS_ACUITY_SCORE: 31
ADLS_ACUITY_SCORE: 31

## 2023-07-17 NOTE — PLAN OF CARE
Problem: Plan of Care - These are the overarching goals to be used throughout the patient stay.    Goal: Optimal Comfort and Wellbeing  Outcome: Progressing     Problem: Skin or Soft Tissue Infection  Goal: Absence of Infection Signs and Symptoms  Outcome: Progressing   Goal Outcome Evaluation:       Denied pain overnight. PRN hydroxyzine given x1 for increased itching overnight. Rash improving. Voiding without difficulty. Tolerating IV abx and IVFs.

## 2023-07-17 NOTE — PLAN OF CARE
Problem: Plan of Care - These are the overarching goals to be used throughout the patient stay.    Goal: Absence of Hospital-Acquired Illness or Injury  Intervention: Identify and Manage Fall Risk  Recent Flowsheet Documentation  Taken 7/16/2023 1610 by Mamie Chávez RN  Safety Promotion/Fall Prevention:    activity supervised    assistive device/personal items within reach    nonskid shoes/slippers when out of bed    patient and family education    clutter free environment maintained  Intervention: Prevent Skin Injury  Recent Flowsheet Documentation  Body Position: position changed independently     Problem: Plan of Care - These are the overarching goals to be used throughout the patient stay.    Goal: Absence of Hospital-Acquired Illness or Injury  Intervention: Prevent Skin Injury  Recent Flowsheet Documentation  Body Position: position changed independently   Goal Outcome Evaluation:    Itching much improved per Patient. Intermittent itching remains.Skin red and blotchy. Scheduled Benadryl given.    Scheduled Kenalog cream applied.  Denied other discomfort.  Scheduled IV Antibiotics/IV Fluids infusing.   AVSS.

## 2023-07-17 NOTE — PROGRESS NOTES
[facility]    Medicine Progress Note - Hospitalist Service    Date of Admission:  7/13/2023    Assessment & Plan         Tyler Miller is a 42 year old male admitted on 7/13/2023. He was recently discharged from [facility] after being managed for Septic arthritis, Strep pyogenes bacteremia, S/p I&D L ankle and R hand 7/5, ID recommended Rocephin x6 weeks - 38 days. Patient arrives to triage from home with chief complaint of rash all over his body which started to develop on Tuesday this week. Patient has a PICC line as well, been receiving IV antibiotics at home.  History of gout and cellulitis. Patient's left foot and LLE is very reddened and swollen.  Patient subsequently came to the ER for further evaluation and management. Will hold off iv ceftriaxone and started on iv clindamycin, vancomycin due to concerns for drug allergy, hold off steroids till cleared by ID, consult ID, ortho.       7/16 :     Continue iv antibiotics per ID  On benadryl prn for rash    .  .    A/p :           Recently discharged from [facility] after being managed for Septic arthritis, Strep pyogenes bacteremia, S/p I&D L ankle and R hand 7/5, ID recommended Rocephin x6 weeks - 38 days    Skin rash     Tophaceous gout     He was recently discharged from [facility] after being managed for Septic arthritis, Strep pyogenes bacteremia, S/p I&D L ankle and R hand 7/5, ID recommended Rocephin x6 weeks - 38 days. Patient arrives to triage from home with chief complaint of rash all over his body which started to develop on Tuesday this week. Patient has a PICC line as well, been receiving IV antibiotics at home.  History of gout and cellulitis. Patient's left foot and LLE is very reddened and swollen.  Patient subsequently came to the ER for further evaluation and management. Will hold off iv ceftriaxone and started on iv clindamycin, vancomycin due to concerns for drug allergy, hold off steroids till cleared by ID, consult ID,  "ortho.        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  Colchicine  Indomethacin  Uloric        #chronic anemia  Monitor     # Chronic pain : on oxycodone     # Mild hyponatremia  # DONNA     2/2 dehydration, iv fluids     # Leucocytosis, mild, chronic            Diet: Regular Diet Adult  Snacks/Supplements Adult: Ensure Enlive; With Meals    DVT Prophylaxis: Pneumatic Compression Devices  Senior Catheter: Not present  Lines: PRESENT      PICC 07/10/23 Single Lumen Right Basilic antibiotics-Site Assessment: WDL      Cardiac Monitoring: None  Code Status: Full Code      Clinically Significant Risk Factors              # Hypoalbuminemia: Lowest albumin = 3.1 g/dL at 7/16/2023  6:44 AM, will monitor as appropriate     # Hypertension: Noted on problem list        # Overweight: Estimated body mass index is 28.23 kg/m  as calculated from the following:    Height as of this encounter: 1.626 m (5' 4\").    Weight as of this encounter: 74.6 kg (164 lb 7.4 oz)., PRESENT ON ADMISSION  # Moderate Malnutrition: based on nutrition assessment, PRESENT ON ADMISSION        Disposition Plan     Expected Discharge Date: 07/17/2023                  Mark Jacques MD  Hospitalist Service  [facility]  Securely message with [employer] (more info)  Text page via [employer] Paging/Directory   ______________________________________________________________________        Physical Exam   Vital Signs: Temp: 98.4  F (36.9  C) Temp src: Oral BP: (!) 145/97 Pulse: 77   Resp: 18 SpO2: 98 % O2 Device: None (Room air)    Weight: 164 lbs 7.41 oz       GENERAL: The patient is not in any acute distressed. Awake and alert.  HEENT: Nonicteric sclerae, PERRLA, EOMI. Oropharynx clear. Moist mucous membranes. Conjunctivae appear well perfused.  HEART: Regular rate and rhythm without murmurs.  LUNGS: Clear to auscultation bilaterally. No wheezing or crackles.  ABDOMEN: Soft, positive bowel sounds, nontender.  SKIN: " generalized rash, no excessive bruising, petechiae, or purpura.  EXTREMITIES : no rashes, no swelling in legs.  NEUROLOGIC: conscious and oriented, follows commands, no obvious focal deficits.  ROS: All other systems negative        Medical Decision Making       60 MINUTES SPENT BY ME on the date of service doing chart review, history, exam, documentation & further activities per the note.  MANAGEMENT DISCUSSED with the following over the past 24 hours: rn, patient       Data

## 2023-07-17 NOTE — TELEPHONE ENCOUNTER
Per chart review, patient is currently hospitalized. Lab results were reviewed by inpatient provider. Closing encounter.    Nando Mao RN, BSN  [facility]

## 2023-07-17 NOTE — PROGRESS NOTES
[facility]    Per Sylvia Manjarrez RN CM, pt is discharging today.  If pt discharges on Ertapenem, he will need a first dose before leaving hospital today.  Writer will plan to do a teach with him prior to discharging (around 3:30 pm).    Kaylah Acosta RN, BSN  [facility] Liaison  [phone] (Mon through Fri, 8:00 am-5:00 pm)  [phone] (Office)    ADDENDUM:  Met with patient and his wife at bedside for teach of IV antibiotic via SL valved PICC.  Plan is for patient to discharge on Ertapenem 1g q 24 hours.  Pt is getting his dose this afternoon at 3:30 pm. Pt will next dose tomorrow evening.    Provided hands-on teaching using teaching mat and demo equipment.  Patient taught SAS method and was able to provide return demonstration using aseptic technique.     Extension tubing to be added by bedside Pratibha GARCIA, once the Ertapenem dose is complete. Delivery of med and supplies will be delivered to patient's home tonight between 6-9 pm.  A nurse  will contact patient to set up the next home nursing visit.    Provided 24/7 phone number and answered all questions.  Patient verbalized understanding of discharge plans. Updated bedside Pratibha GARCIA, and Sylvia Manjarrez RN CM.

## 2023-07-17 NOTE — PLAN OF CARE
Problem: Plan of Care - These are the overarching goals to be used throughout the patient stay.    Goal: Readiness for Transition of Care  Outcome: Progressing     Problem: Skin or Soft Tissue Infection  Goal: Absence of Infection Signs and Symptoms  Outcome: Progressing   Goal Outcome Evaluation:         Alert and oriented.   Denies pain.     Iv antibiotics.     Scheduled benadryl for itching.   Generalized rash, improving per patient report.     Pt very anxious to discharge home. Hospitalist updated.   IV abx changed to Ertapenem. Ok to discharge per ID. Will follow up with ID and ortho outpt.    Discharged today on IV antibiotics with Home Infusion.

## 2023-07-17 NOTE — PROGRESS NOTES
SPIRITUAL HEALTH SERVICES NOTE  [facility]; P2    SPIRITUAL ASSESSMENT  Wanting to be discharged today  Wife needs to go to work; he watches their children at home  Reports support from his family  Shamanistic beliefs; no needs identified at this time.    FULL SPIRITUAL CARE NOTE:   Saw Tyler due to admission screening response. He was accompanied by his wife and two small children. Our conversation was facilitated by a Hmong  on the [employer]. Tyler says that he has an infection that is being treated with antibiotics. He says that he has not had an infection like this before. He denies any pain at the time of my visit. He says that his main concern is being discharged today because his wife needs to return to work and he needs to be at home to watch their children when she goes to work. Tyler says that he has family members who are supportive and that he feels comfortable asking for help. He holds traditional Hmong/Shaministic beliefs. I explained that we do not have a shaman here, but asked how we can support him while he is here in the hospital. He denies any spiritual or emotional needs at this time. I encouraged him to let nursing staff know if he desires further support from Spiritual Care.     Time Spent with Patient/Family: 10 minutes    PLAN OF CARE: No plans for follow-up at this time due to patient's anticipated discharge today/tomorrow.  available by patient or staff request.     Lyla Woodruff M.Div.      Office: [phone] (for non-urgent requests)  Please Vocera or page through [facility] for time-sensitive requests

## 2023-07-17 NOTE — PROGRESS NOTES
Care Management Follow Up    Length of Stay (days): 4    Expected Discharge Date: 07/17/2023     Concerns to be Addressed:     Care Progression  Patient plan of care discussed at interdisciplinary rounds: Yes    Anticipated Discharge Disposition:  Home with home infusion     Anticipated Discharge Services:  Home infusion  Anticipated Discharge DME:  [facility]    Patient/family educated on Medicare website which has current facility and service quality ratings:  NA  Education Provided on the Discharge Plan:  NA  Patient/Family in Agreement with the Plan:  NA    Referrals Placed by CM/SW:  [facility]  Private pay costs discussed: Not applicable    Additional Information:  Discussed with [facility] liaison, new teaching will need to be completed at hospital as patient will now have infusion ball vs iv push medications. Awaiting medical readiness for discharge.     Social HX: Patient home independent with [facility] services for IV abx.    CM will continue to follow care progression and aide in discharge planning as needed.     Rebeca Manjarrez RN

## 2023-07-17 NOTE — PROGRESS NOTES
"Orthopedic Progress Note      Assessment:    S/P Procedure(s):  ARTHROTOMY, ANKLE     Tophaceous gout involving multiple joints   Septic arthritis    Plan:   - Medical management per hospitalist team  - PICC line in place, set up for home infusions  - ID following, appreciate recommendations  - Ok from Ortho standpoint to discharge to home with outpatient follow up with foot and ankle team  - Discharge planning: pending medical clearance and teachings for home infusions      Subjective:  Pain: mild   Chest pain, SOB: no  Nausea, Vomiting:  no  Lightheadedness, Dizziness:  no  Neuro:  Patient denies new onset numbness or paresthesias    Patient is improving slightly today. Reports he is less itchy this morning. Anxious to go home.   No fevers, chills, or other changes reported overnight.    Objective:  /89 (BP Location: Left arm)   Pulse 85   Temp 98.4  F (36.9  C) (Oral)   Resp 18   Ht 1.626 m (5' 4\")   Wt 74.6 kg (164 lb 7.4 oz)   SpO2 98%   BMI 28.23 kg/m    The patient is A&Ox3. Appears comfortable sitting up in bed.    Sensation is intact to upper and lower extremities.   Dorsiflexion and plantar flexion is intact, although limited due to pain in LLE  Dorsalis pedis pulse is palpable.   Multiple tophi appearing nodules to hands and feet.   No active drainage from ankle appreciated on my exam this morning.    Pertinent Labs   Lab Results: personally reviewed.   Lab Results   Component Value Date    INR 1.09 07/13/2023     Lab Results   Component Value Date    WBC 8.6 07/15/2023    HGB 8.8 (L) 07/15/2023    HCT 29.6 (L) 07/15/2023    MCV 70 (L) 07/15/2023     (H) 07/15/2023     Lab Results   Component Value Date     (L) 07/16/2023    CO2 20 (L) 07/16/2023         Report completed by:  Karely Cavazos PA-C  [facility]    Date: 7/17/2023  Time: 2:21 PM    "

## 2023-07-17 NOTE — PROGRESS NOTES
[facility]    Infectious Disease Progress Note     07/17/2023     Assessment & Plan   Tyler Miller is a 42 year old male who was admitted on 7/13/2023.     ASSESSMENT:  1. Rash improving slightly-diffuse- does not seem to be resolving  2. Streptococcal coccus group A bacteremia, septic arthritis, gout discharged on ceftriaxone.  Ceftriaxone has been stopped and patient switched to vancomycin and clindamycin. Itching and rash present  3. 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  Patient declining repeat surgical procedure at this time                      RECOMMENDATIONS:    1. Change antibiotics to ertapenem x 4 weeks as per Dr Black.  2. Follow-up with Dr Black in 2 to 4 weeks.    OK to discharge to home anytime from Infectious Disease standpoint.          NEIDA GIORDANO MD   [facility]  [phone]      Interval History   Rash improved, itching less  Less erythematous, less pain  Eager to go home as wife has to work tomorrow.     Updated patient and patient's wife regarding antibiotic plan    Previous note  Probably better, rash slightly less erythematous though still diffuse and present    Physical Exam   Temp: 98.4  F (36.9  C) Temp src: Oral BP: 137/89 Pulse: 85   Resp: 18 SpO2: 98 % O2 Device: None (Room air)    Vitals:    07/13/23 1545 07/14/23 1415   Weight: 78 kg (172 lb) 74.6 kg (164 lb 7.4 oz)     Vital Signs with Ranges  Temp:  [98.4  F (36.9  C)-98.5  F (36.9  C)] 98.4  F (36.9  C)  Pulse:  [77-86] 85  Resp:  [18] 18  BP: (133-145)/(81-97) 137/89  SpO2:  [95 %-99 %] 98 %    Constitutional: Awake, no apparent distress  Lungs: normal breathing pattern, no crackles or wheezing  Cardiovascular: Regular rate and rhythm, S1 S2  Abdomen: non distended  Skin: warm  Rash, says better--my first time seeing it.   Neuro: deconditioned  Close girdle: Multiple areas of tophaceous gout, chalky drainage from ankle  Psych: able to answer  questions    Medications     sodium chloride 100 mL/hr at 07/17/23 1249       diphenhydrAMINE  25 mg Oral Q8H     meropenem  1 g Intravenous Q8H     micafungin  100 mg Intravenous Q24H     predniSONE  20 mg Oral Daily     sodium chloride (PF)  3 mL Intracatheter Q8H     triamcinolone   Topical BID       Data   All microbiology laboratory data reviewed.  Recent Labs   Lab Test 07/15/23  0725 07/14/23  0739 07/13/23  1832   WBC 8.6 12.9* 15.4*   HGB 8.8* 9.2* 9.7*   HCT 29.6* 29.8* 31.1*   MCV 70* 69* 67*   * 559* 553*     Recent Labs   Lab Test 07/16/23  0644 07/15/23  0725 07/14/23  0739   CR 0.99 1.13  1.12 1.08     Recent Labs   Lab Test 07/11/23  1110   *     No lab results found.    Invalid input(s):     MICROBIOLOGY:    Reviewed    7-Day Micro Results     Collected Updated Procedure Result Status      07/13/2023 1748 07/16/2023 2101 Anaerobic Bacterial Culture Routine [[id number]]   Wound from Ankle, Left    Preliminary result Component Value   Culture No anaerobic organisms isolated after 3 days  [P]                07/13/2023 1748 07/15/2023 1027 Wound Aerobic Bacterial Culture Routine [[id number]]   Wound from Foot, Left    Final result Component Value   Culture No Growth               07/13/2023 1741 07/16/2023 2046 Blood Culture Peripheral Blood [[id number]]   Peripheral Blood    Preliminary result Component Value   Culture No growth after 3 days  [P]                07/13/2023 1741 07/16/2023 2046 Blood Culture Peripheral Blood [[id number]]   Peripheral Blood    Preliminary result Component Value   Culture No growth after 3 days  [P]                       RADIOLOGY:    Reviewed  MR Ankle Left w/o & w Contrast    Result Date: 7/9/2023  EXAM: MR ANKLE LEFT WITHOUT AND WITH CONTRAST, MR FOOT LEFT WITHOUT AND WITH CONTRAST LOCATION: [facility] DATE: 07/09/2023 INDICATION: Assess for reaccumulation of abscess. Bacteremia and septic arthritis. COMPARISON:  07/04/2023. TECHNIQUE: Routine. Additional postgadolinium T1 sequences were obtained. IV CONTRAST: 8 mL Gadavist. FINDINGS: Again seen are extensive tophus formation with erosion in the forefoot and hindfoot compatible with gouty arthropathy. Superimposed infection within the areas of tophus formation is difficult to exclude by imaging as there is considerable surrounding soft tissue enhancement along the peripheral aspect of the tophus as well as extensive soft tissue edema and inflammation. Tophus formation is seen about the joints as well as within the subcutaneous soft tissues. There is tophus formation intermixed with complex fluid along the anterolateral aspect of the ankle superficial to the lateral ankle ligaments and fibula as seen on axial image 23. There is patchy bone marrow edema along portions of the talus, and calcaneus. Patchy bone marrow edema across the TMT joints. The fluid in the tibiotalar and subtalar joints has decreased, noting there is a small amount of residual fluid in the tibiotalar joint. There is multifocal tenosynovitis with moderate to severe tenosynovitis along the medial flexor tendons where there is complex fluid. There is no evidence of a drainable soft tissue abscess or definitive evidence of osteomyelitis. No significant joint effusion within the forefoot or midfoot. No acute tendon abnormality, with Achilles tendinosis again seen, with intratendinous tophus. There is chronic appearing osteonecrosis of the distal tibia. No definitive evidence of osteomyelitis. There is considerable dorsal subcutaneous edema and reticulation without a well-defined rim-enhancing dorsal fluid collection. This is presumably inflammatory or infectious.     IMPRESSION: 1.  Extensive tophus formation and soft tissue inflammation about the forefoot and hindfoot compatible with gouty arthropathy. Superimposed infection within the areas of tophus formation is difficult to exclude by imaging. 2.  The  amount of joint fluid in the tibiotalar and subtalar joints has decreased from the prior exam, with a small amount of residual versus recurrent fluid. 3.  Multifocal tenosynovitis, most pronounced along the medial flexor tendons where there is complex tenosynovial fluid. 4.  Along the anterolateral ankle superficial to the lateral ankle ligaments and fibula, there is tophus formation intermixed with superimposed poorly defined defined fluid along the superficial aspect of the tophus. Infection at this site is difficult to exclude. 5.  No definitive evidence of osteomyelitis, noting there is reactive bone marrow edema primarily in the hindfoot and ankle. 6.   Chronic appearing osteonecrosis of the distal tibia.     MR Foot Left w/o & w Contrast    Result Date: 7/9/2023  EXAM: MR ANKLE LEFT WITHOUT AND WITH CONTRAST, MR FOOT LEFT WITHOUT AND WITH CONTRAST LOCATION: [facility] DATE: 07/09/2023 INDICATION: Assess for reaccumulation of abscess. Bacteremia and septic arthritis. COMPARISON: 07/04/2023. TECHNIQUE: Routine. Additional postgadolinium T1 sequences were obtained. IV CONTRAST: 8 mL Gadavist. FINDINGS: Again seen are extensive tophus formation with erosion in the forefoot and hindfoot compatible with gouty arthropathy. Superimposed infection within the areas of tophus formation is difficult to exclude by imaging as there is considerable surrounding soft tissue enhancement along the peripheral aspect of the tophus as well as extensive soft tissue edema and inflammation. Tophus formation is seen about the joints as well as within the subcutaneous soft tissues. There is tophus formation intermixed with complex fluid along the anterolateral aspect of the ankle superficial to the lateral ankle ligaments and fibula as seen on axial image 23. There is patchy bone marrow edema along portions of the talus, and calcaneus. Patchy bone marrow edema across the TMT joints. The fluid in the tibiotalar  "and subtalar joints has decreased, noting there is a small amount of residual fluid in the tibiotalar joint. There is multifocal tenosynovitis with moderate to severe tenosynovitis along the medial flexor tendons where there is complex fluid. There is no evidence of a drainable soft tissue abscess or definitive evidence of osteomyelitis. No significant joint effusion within the forefoot or midfoot. No acute tendon abnormality, with Achilles tendinosis again seen, with intratendinous tophus. There is chronic appearing osteonecrosis of the distal tibia. No definitive evidence of osteomyelitis. There is considerable dorsal subcutaneous edema and reticulation without a well-defined rim-enhancing dorsal fluid collection. This is presumably inflammatory or infectious.     IMPRESSION: 1.  Extensive tophus formation and soft tissue inflammation about the forefoot and hindfoot compatible with gouty arthropathy. Superimposed infection within the areas of tophus formation is difficult to exclude by imaging. 2.  The amount of joint fluid in the tibiotalar and subtalar joints has decreased from the prior exam, with a small amount of residual versus recurrent fluid. 3.  Multifocal tenosynovitis, most pronounced along the medial flexor tendons where there is complex tenosynovial fluid. 4.  Along the anterolateral ankle superficial to the lateral ankle ligaments and fibula, there is tophus formation intermixed with superimposed poorly defined defined fluid along the superficial aspect of the tophus. Infection at this site is difficult to exclude. 5.  No definitive evidence of osteomyelitis, noting there is reactive bone marrow edema primarily in the hindfoot and ankle. 6.   Chronic appearing osteonecrosis of the distal tibia.     POC US Guidance Needle Placement    Result Date: 7/5/2023  Ultrasound was performed as guidance to an anesthesia procedure.  Click \"PACS images\" hyperlink below to view any stored images.  For specific " procedure details, view procedure note authored by anesthesia.    US Joint Injection Aspiration Major Left    Result Date: 7/4/2023  EXAM: 1. PUNCTURE AND ASPIRATION LEFT ANKLE JOINT 2. ULTRASOUND GUIDANCE LOCATION: [facility] DATE: 7/4/2023 INDICATION: left ankle  known gout, also Strep bacteremia, evaluate for septic arthritis. PROCEDURE: Informed consent obtained. Time out performed. The site was prepped and draped in sterile fashion. 10 mL of 1% lidocaine was infused into the local soft tissues. Under direct ultrasound guidance, a 22 gauge needle was placed into the fluid pocket and 5 ml of brown, cloudy fluid was aspirated. This was sent for cultures.     IMPRESSION: 1.  Status post ultrasound-guided puncture and aspiration of the left ankle joint.     MR Foot Left w/o & w Contrast    Result Date: 7/4/2023  EXAM: MR FOOT LEFT W/O and W CONTRAST, MR ANKLE LEFT W/O and W CONTRAST LOCATION: [facility] DATE: 7/4/2023 INDICATION: left ankle pain, erosion of 2nd MT and phalanx, RO septic arthritis, gout, vs osteo COMPARISON: None. TECHNIQUE: Routine. Additional postgadolinium T1 sequences were obtained. IV CONTRAST: 8ml Gadavist FINDINGS: JOINTS AND BONES: -Extensive erosive osseous changes are seen in the left foot and ankle most pronounced in the first MTP joint as well as the second metatarsal head with additional erosive foci are seen in the midfoot, second mid and distal phalanx, with large proliferative erosive changes seen in the posterior subtalar joints and posterior aspect of the talar. These proliferative erosive soft tissue masses are relatively hypointense on T1 and isointense on T2-weighted heterogeneous enhancement on postcontrast  imaging. The bone marrow adjacent to the erosive masses is not significantly edematous or enhancing. TENDONS: -There is mild thickening and increased T2 signal of the distal aspect of the Achilles tendon (image 12, series  12). LIGAMENTS: -Lisfranc ligament: Intact. No subluxation. MUSCLES AND SOFT TISSUES: -Extensive large soft tissue masses is are seen in throughout the forefoot and hindfoot, which are low on T1 signal and intermediate T2 signal with variable postcontrast enhancement.     IMPRESSION: 1.  Multifocal extensive large soft tissue masses with underlying osseous erosion seen in the forefoot and hindfoot, most pronounced at the first MTP joint and second metatarsal head as well as in the posterior aspect of the ankle joint, favored reflect large gouty tophi given the patient's history and enhancement characteristics. 2.  No evidence of drainable soft tissue abscess or definitive evidence of osteomyelitis is seen. 3.  Mild Achilles tendinosis    MR Ankle Left w/o & w Contrast    Result Date: 7/4/2023  EXAM: MR FOOT LEFT W/O and W CONTRAST, MR ANKLE LEFT W/O and W CONTRAST LOCATION: [facility] DATE: 7/4/2023 INDICATION: left ankle pain, erosion of 2nd MT and phalanx, RO septic arthritis, gout, vs osteo COMPARISON: None. TECHNIQUE: Routine. Additional postgadolinium T1 sequences were obtained. IV CONTRAST: 8ml Gadavist FINDINGS: JOINTS AND BONES: -Extensive erosive osseous changes are seen in the left foot and ankle most pronounced in the first MTP joint as well as the second metatarsal head with additional erosive foci are seen in the midfoot, second mid and distal phalanx, with large proliferative erosive changes seen in the posterior subtalar joints and posterior aspect of the talar. These proliferative erosive soft tissue masses are relatively hypointense on T1 and isointense on T2-weighted heterogeneous enhancement on postcontrast  imaging. The bone marrow adjacent to the erosive masses is not significantly edematous or enhancing. TENDONS: -There is mild thickening and increased T2 signal of the distal aspect of the Achilles tendon (image 12, series 12). LIGAMENTS: -Lisfranc ligament: Intact. No  subluxation. MUSCLES AND SOFT TISSUES: -Extensive large soft tissue masses is are seen in throughout the forefoot and hindfoot, which are low on T1 signal and intermediate T2 signal with variable postcontrast enhancement.     IMPRESSION: 1.  Multifocal extensive large soft tissue masses with underlying osseous erosion seen in the forefoot and hindfoot, most pronounced at the first MTP joint and second metatarsal head as well as in the posterior aspect of the ankle joint, favored reflect large gouty tophi given the patient's history and enhancement characteristics. 2.  No evidence of drainable soft tissue abscess or definitive evidence of osteomyelitis is seen. 3.  Mild Achilles tendinosis    XR Foot 3 Views Standing Bilateral    Result Date: 7/3/2023  EXAM: XR FOOT 3 VIEWS STANDING BILATERAL LOCATION: [facility] DATE: 7/3/2023 INDICATION: Chronic bilateral foot wounds. Evaluate osteomyelitis or occult pathology. History of gout. COMPARISON: None.     IMPRESSION: Marked erosive/destructive changes involving the right second MTP joint with extensive destruction of the second metatarsal head and neck as well as the majority of the proximal middle phalanges most likely secondary to gout with marked surrounding soft tissue swelling. Mild periarticular erosive changes involving the right third and fourth TMT and third MTP joint. In the left foot, there are marked periarticular erosive changes involving the MTP joint of the great toe with marked surrounding soft tissue swelling most consistent with gout. Mild periarticular erosive changes involving the second MTP and third TMT joints.     XR Finger Right G/E 2 Views    Result Date: 7/3/2023  EXAM: XR FINGER RIGHT G/E 2 VIEWS LOCATION: [facility] DATE: 7/3/2023 INDICATION: right thumb pain, h o gout COMPARISON: 03/29/2021     IMPRESSION: Marked soft tissue swelling of the proximal soft tissues of the thumb with periarticular  osteopenia which likely reflect the sequela of patient's known gout. There is slight subluxation of the distal phalanx of the thumb relative to the proximal phalanx, though no evidence of a displaced fracture or dislocation.     XR Ankle Left G/E 3 Views    Result Date: 7/3/2023  EXAM: XR ANKLE LEFT G/E 3 VIEWS LOCATION: [facility] DATE: 7/3/2023 INDICATION: left ankle pain, h o gout COMPARISON: None.     IMPRESSION: No acute fracture or erosion. No foreign body.     Total Time Spent 35 minutes with >50% of the time spent in chart review, evaluation, management, counseling, education and care coordination.

## 2023-07-18 LAB
BACTERIA BLD CULT: NO GROWTH
BACTERIA BLD CULT: NO GROWTH
BACTERIA SNV CULT: NORMAL

## 2023-07-20 ENCOUNTER — LAB REQUISITION (OUTPATIENT)
Dept: LAB | Facility: HOSPITAL | Age: 43
End: 2023-07-20
Payer: COMMERCIAL

## 2023-07-20 DIAGNOSIS — L03.011 CELLULITIS OF RIGHT FINGER: ICD-10-CM

## 2023-07-20 LAB
BACTERIA WND CULT: NORMAL
BASOPHILS # BLD AUTO: 0.1 10E3/UL (ref 0–0.2)
BASOPHILS NFR BLD AUTO: 1 %
BUN SERPL-MCNC: 16.3 MG/DL (ref 6–20)
CREAT SERPL-MCNC: 0.78 MG/DL (ref 0.67–1.17)
CRP SERPL-MCNC: 6.6 MG/L
EOSINOPHIL # BLD AUTO: 0 10E3/UL (ref 0–0.7)
EOSINOPHIL NFR BLD AUTO: 0 %
ERYTHROCYTE [DISTWIDTH] IN BLOOD BY AUTOMATED COUNT: 20.7 % (ref 10–15)
ERYTHROCYTE [SEDIMENTATION RATE] IN BLOOD BY WESTERGREN METHOD: 75 MM/HR (ref 0–15)
GFR SERPL CREATININE-BSD FRML MDRD: >90 ML/MIN/1.73M2
HCT VFR BLD AUTO: 33.2 % (ref 40–53)
HGB BLD-MCNC: 9.9 G/DL (ref 13.3–17.7)
IMM GRANULOCYTES # BLD: 0.1 10E3/UL
IMM GRANULOCYTES NFR BLD: 1 %
LYMPHOCYTES # BLD AUTO: 2 10E3/UL (ref 0.8–5.3)
LYMPHOCYTES NFR BLD AUTO: 18 %
MCH RBC QN AUTO: 21.5 PG (ref 26.5–33)
MCHC RBC AUTO-ENTMCNC: 29.8 G/DL (ref 31.5–36.5)
MCV RBC AUTO: 72 FL (ref 78–100)
MONOCYTES # BLD AUTO: 0.3 10E3/UL (ref 0–1.3)
MONOCYTES NFR BLD AUTO: 3 %
NEUTROPHILS # BLD AUTO: 8.3 10E3/UL (ref 1.6–8.3)
NEUTROPHILS NFR BLD AUTO: 77 %
NRBC # BLD AUTO: 0 10E3/UL
NRBC BLD AUTO-RTO: 0 /100
PLATELET # BLD AUTO: 598 10E3/UL (ref 150–450)
RBC # BLD AUTO: 4.61 10E6/UL (ref 4.4–5.9)
WBC # BLD AUTO: 10.7 10E3/UL (ref 4–11)

## 2023-07-20 PROCEDURE — 85025 COMPLETE CBC W/AUTO DIFF WBC: CPT | Performed by: INTERNAL MEDICINE

## 2023-07-20 PROCEDURE — 86140 C-REACTIVE PROTEIN: CPT | Performed by: INTERNAL MEDICINE

## 2023-07-20 PROCEDURE — 84520 ASSAY OF UREA NITROGEN: CPT | Performed by: INTERNAL MEDICINE

## 2023-07-20 PROCEDURE — 85652 RBC SED RATE AUTOMATED: CPT | Performed by: INTERNAL MEDICINE

## 2023-07-20 PROCEDURE — 82565 ASSAY OF CREATININE: CPT | Performed by: INTERNAL MEDICINE

## 2023-07-27 ENCOUNTER — LAB REQUISITION (OUTPATIENT)
Dept: LAB | Facility: HOSPITAL | Age: 43
End: 2023-07-27
Payer: COMMERCIAL

## 2023-07-27 DIAGNOSIS — L03.011 CELLULITIS OF RIGHT FINGER: ICD-10-CM

## 2023-07-27 LAB
BASOPHILS # BLD AUTO: 0.1 10E3/UL (ref 0–0.2)
BASOPHILS NFR BLD AUTO: 1 %
BUN SERPL-MCNC: 24.2 MG/DL (ref 6–20)
CREAT SERPL-MCNC: 0.84 MG/DL (ref 0.67–1.17)
CRP SERPL-MCNC: 11.6 MG/L
EOSINOPHIL # BLD AUTO: 0.2 10E3/UL (ref 0–0.7)
EOSINOPHIL NFR BLD AUTO: 2 %
ERYTHROCYTE [DISTWIDTH] IN BLOOD BY AUTOMATED COUNT: 21.2 % (ref 10–15)
ERYTHROCYTE [SEDIMENTATION RATE] IN BLOOD BY WESTERGREN METHOD: 57 MM/HR (ref 0–15)
GFR SERPL CREATININE-BSD FRML MDRD: >90 ML/MIN/1.73M2
HCT VFR BLD AUTO: 34 % (ref 40–53)
HGB BLD-MCNC: 10.1 G/DL (ref 13.3–17.7)
IMM GRANULOCYTES # BLD: 0.1 10E3/UL
IMM GRANULOCYTES NFR BLD: 1 %
LYMPHOCYTES # BLD AUTO: 1.8 10E3/UL (ref 0.8–5.3)
LYMPHOCYTES NFR BLD AUTO: 17 %
MCH RBC QN AUTO: 21.9 PG (ref 26.5–33)
MCHC RBC AUTO-ENTMCNC: 29.7 G/DL (ref 31.5–36.5)
MCV RBC AUTO: 74 FL (ref 78–100)
MONOCYTES # BLD AUTO: 0.5 10E3/UL (ref 0–1.3)
MONOCYTES NFR BLD AUTO: 5 %
NEUTROPHILS # BLD AUTO: 7.9 10E3/UL (ref 1.6–8.3)
NEUTROPHILS NFR BLD AUTO: 74 %
NRBC # BLD AUTO: 0 10E3/UL
NRBC BLD AUTO-RTO: 0 /100
PLATELET # BLD AUTO: 435 10E3/UL (ref 150–450)
RBC # BLD AUTO: 4.61 10E6/UL (ref 4.4–5.9)
WBC # BLD AUTO: 10.5 10E3/UL (ref 4–11)

## 2023-07-27 PROCEDURE — 85652 RBC SED RATE AUTOMATED: CPT | Performed by: INTERNAL MEDICINE

## 2023-07-27 PROCEDURE — 84520 ASSAY OF UREA NITROGEN: CPT | Performed by: INTERNAL MEDICINE

## 2023-07-27 PROCEDURE — 85014 HEMATOCRIT: CPT | Performed by: INTERNAL MEDICINE

## 2023-07-27 PROCEDURE — 82565 ASSAY OF CREATININE: CPT | Performed by: INTERNAL MEDICINE

## 2023-07-27 PROCEDURE — 86140 C-REACTIVE PROTEIN: CPT | Performed by: INTERNAL MEDICINE

## 2023-08-02 LAB — BACTERIA WND CULT: NO GROWTH

## 2023-08-03 ENCOUNTER — LAB REQUISITION (OUTPATIENT)
Dept: LAB | Facility: HOSPITAL | Age: 43
End: 2023-08-03
Payer: COMMERCIAL

## 2023-08-03 DIAGNOSIS — L03.011 CELLULITIS OF RIGHT FINGER: ICD-10-CM

## 2023-08-03 LAB
AST SERPL W P-5'-P-CCNC: 22 U/L (ref 0–45)
BASOPHILS # BLD AUTO: 0 10E3/UL (ref 0–0.2)
BASOPHILS NFR BLD AUTO: 0 %
BUN SERPL-MCNC: 23.2 MG/DL (ref 6–20)
CREAT SERPL-MCNC: 0.77 MG/DL (ref 0.67–1.17)
CRP SERPL-MCNC: 6.3 MG/L
EOSINOPHIL # BLD AUTO: 0.2 10E3/UL (ref 0–0.7)
EOSINOPHIL NFR BLD AUTO: 2 %
ERYTHROCYTE [DISTWIDTH] IN BLOOD BY AUTOMATED COUNT: 20.6 % (ref 10–15)
ERYTHROCYTE [SEDIMENTATION RATE] IN BLOOD BY WESTERGREN METHOD: 49 MM/HR (ref 0–15)
GFR SERPL CREATININE-BSD FRML MDRD: >90 ML/MIN/1.73M2
HCT VFR BLD AUTO: 35.6 % (ref 40–53)
HGB BLD-MCNC: 10.4 G/DL (ref 13.3–17.7)
IMM GRANULOCYTES # BLD: 0 10E3/UL
IMM GRANULOCYTES NFR BLD: 0 %
LYMPHOCYTES # BLD AUTO: 3 10E3/UL (ref 0.8–5.3)
LYMPHOCYTES NFR BLD AUTO: 34 %
MCH RBC QN AUTO: 22 PG (ref 26.5–33)
MCHC RBC AUTO-ENTMCNC: 29.2 G/DL (ref 31.5–36.5)
MCV RBC AUTO: 75 FL (ref 78–100)
MONOCYTES # BLD AUTO: 0.7 10E3/UL (ref 0–1.3)
MONOCYTES NFR BLD AUTO: 8 %
NEUTROPHILS # BLD AUTO: 5 10E3/UL (ref 1.6–8.3)
NEUTROPHILS NFR BLD AUTO: 56 %
NRBC # BLD AUTO: 0 10E3/UL
NRBC BLD AUTO-RTO: 0 /100
PLATELET # BLD AUTO: 430 10E3/UL (ref 150–450)
RBC # BLD AUTO: 4.73 10E6/UL (ref 4.4–5.9)
WBC # BLD AUTO: 8.9 10E3/UL (ref 4–11)

## 2023-08-03 PROCEDURE — 85652 RBC SED RATE AUTOMATED: CPT | Performed by: INTERNAL MEDICINE

## 2023-08-03 PROCEDURE — 82565 ASSAY OF CREATININE: CPT | Performed by: INTERNAL MEDICINE

## 2023-08-03 PROCEDURE — 84450 TRANSFERASE (AST) (SGOT): CPT | Performed by: INTERNAL MEDICINE

## 2023-08-03 PROCEDURE — 86140 C-REACTIVE PROTEIN: CPT | Performed by: INTERNAL MEDICINE

## 2023-08-03 PROCEDURE — 84520 ASSAY OF UREA NITROGEN: CPT | Performed by: INTERNAL MEDICINE

## 2023-08-03 PROCEDURE — 85025 COMPLETE CBC W/AUTO DIFF WBC: CPT | Performed by: INTERNAL MEDICINE

## 2023-08-09 ENCOUNTER — TELEPHONE (OUTPATIENT)
Dept: INFECTIOUS DISEASES | Facility: CLINIC | Age: 43
End: 2023-08-09
Payer: COMMERCIAL

## 2023-08-09 NOTE — TELEPHONE ENCOUNTER
Pt no showed hospital follow-up appt on 8/7 with Dr. Black. Next available is 9/18. Please advise.

## 2023-08-10 ENCOUNTER — LAB REQUISITION (OUTPATIENT)
Dept: LAB | Facility: HOSPITAL | Age: 43
End: 2023-08-10
Payer: COMMERCIAL

## 2023-08-10 DIAGNOSIS — L03.011 CELLULITIS OF RIGHT FINGER: ICD-10-CM

## 2023-08-10 LAB
AST SERPL W P-5'-P-CCNC: 24 U/L (ref 0–45)
BASOPHILS # BLD AUTO: 0 10E3/UL (ref 0–0.2)
BASOPHILS NFR BLD AUTO: 0 %
BUN SERPL-MCNC: 10.7 MG/DL (ref 6–20)
CREAT SERPL-MCNC: 0.7 MG/DL (ref 0.67–1.17)
CRP SERPL-MCNC: 3.3 MG/L
EOSINOPHIL # BLD AUTO: 0.3 10E3/UL (ref 0–0.7)
EOSINOPHIL NFR BLD AUTO: 4 %
ERYTHROCYTE [DISTWIDTH] IN BLOOD BY AUTOMATED COUNT: 19.7 % (ref 10–15)
ERYTHROCYTE [SEDIMENTATION RATE] IN BLOOD BY WESTERGREN METHOD: 34 MM/HR (ref 0–15)
GFR SERPL CREATININE-BSD FRML MDRD: >90 ML/MIN/1.73M2
HCT VFR BLD AUTO: 35.8 % (ref 40–53)
HGB BLD-MCNC: 10.8 G/DL (ref 13.3–17.7)
IMM GRANULOCYTES # BLD: 0 10E3/UL
IMM GRANULOCYTES NFR BLD: 0 %
LYMPHOCYTES # BLD AUTO: 1.7 10E3/UL (ref 0.8–5.3)
LYMPHOCYTES NFR BLD AUTO: 24 %
MCH RBC QN AUTO: 22.4 PG (ref 26.5–33)
MCHC RBC AUTO-ENTMCNC: 30.2 G/DL (ref 31.5–36.5)
MCV RBC AUTO: 74 FL (ref 78–100)
MONOCYTES # BLD AUTO: 0.7 10E3/UL (ref 0–1.3)
MONOCYTES NFR BLD AUTO: 10 %
NEUTROPHILS # BLD AUTO: 4.5 10E3/UL (ref 1.6–8.3)
NEUTROPHILS NFR BLD AUTO: 62 %
NRBC # BLD AUTO: 0 10E3/UL
NRBC BLD AUTO-RTO: 0 /100
PLATELET # BLD AUTO: 410 10E3/UL (ref 150–450)
RBC # BLD AUTO: 4.83 10E6/UL (ref 4.4–5.9)
WBC # BLD AUTO: 7.2 10E3/UL (ref 4–11)

## 2023-08-10 PROCEDURE — 84520 ASSAY OF UREA NITROGEN: CPT | Performed by: INTERNAL MEDICINE

## 2023-08-10 PROCEDURE — 82565 ASSAY OF CREATININE: CPT | Performed by: INTERNAL MEDICINE

## 2023-08-10 PROCEDURE — 85652 RBC SED RATE AUTOMATED: CPT | Performed by: INTERNAL MEDICINE

## 2023-08-10 PROCEDURE — 85025 COMPLETE CBC W/AUTO DIFF WBC: CPT | Performed by: INTERNAL MEDICINE

## 2023-08-10 PROCEDURE — 86140 C-REACTIVE PROTEIN: CPT | Performed by: INTERNAL MEDICINE

## 2023-08-10 PROCEDURE — 84450 TRANSFERASE (AST) (SGOT): CPT | Performed by: INTERNAL MEDICINE

## 2023-08-11 ENCOUNTER — APPOINTMENT (OUTPATIENT)
Dept: INTERPRETER SERVICES | Facility: CLINIC | Age: 43
End: 2023-08-11
Payer: COMMERCIAL

## 2023-08-12 ENCOUNTER — HEALTH MAINTENANCE LETTER (OUTPATIENT)
Age: 43
End: 2023-08-12

## 2023-08-14 ENCOUNTER — APPOINTMENT (OUTPATIENT)
Dept: INTERPRETER SERVICES | Facility: CLINIC | Age: 43
End: 2023-08-14
Payer: COMMERCIAL

## 2023-08-14 NOTE — TELEPHONE ENCOUNTER
Spoke with patient, he said that the appt on 9/11 at 140pm doesn't work for him because his wife doesn't get home till 230pm to watch the kids. Does provider want to do a video appointment?    Pt is also wondering when his IV antibiotics will be done. He has gone back to work today and the IV bothers him.     Please gerald him on home number.

## 2023-08-24 NOTE — DISCHARGE SUMMARY
[facility]  Hospitalist Discharge Summary      Date of Admission:  7/13/2023  Date of Discharge:  7/17/2023  5:33 PM  Discharging Provider: Mark Jacques MD  Discharge Service: Hospitalist Service    Discharge Diagnoses         Tyler Miller is a 42 year old male admitted on 7/13/2023. He was recently discharged from [facility] after being managed for Septic arthritis, Strep pyogenes bacteremia, S/p I&D L ankle and R hand 7/5, ID recommended Rocephin x6 weeks - 38 days. Patient arrives to triage from home with chief complaint of rash all over his body which started to develop on Tuesday this week. Patient has a PICC line as well, been receiving IV antibiotics at home.  History of gout and cellulitis. Patient's left foot and LLE is very reddened and swollen.  Patient subsequently came to the ER for further evaluation and management. Will hold off iv ceftriaxone and started on iv clindamycin, vancomycin due to concerns for drug allergy, hold off steroids till cleared by ID, consult ID, ortho.         7/17 :      Medically stable  Discharge home today          .    A/p :         Recently discharged from [facility] after being managed for Septic arthritis, Strep pyogenes bacteremia, S/p I&D L ankle and R hand 7/5, ID recommended Rocephin x6 weeks - 38 days  Skin rash   Tophaceous gout     He was recently discharged from [facility] after being managed for Septic arthritis, Strep pyogenes bacteremia, S/p I&D L ankle and R hand 7/5  ID recommended Rocephin x6 weeks - 38 days.   Patient arrived to triage from home with chief complaint of rash all over his body which started to develop on Tuesday this week. Patient has a PICC line as well, been receiving IV antibiotics at home.  History of gout and cellulitis. Patient's left foot and LLE is very reddened and swollen.  Patient subsequently came to the ER for further evaluation and management.   Will hold off iv ceftriaxone and started on iv clindamycin, vancomycin  due to concerns for drug allergy, hold off steroids till cleared by ID, consult ID, ortho.      ID recommends : ertapenem for 4 weeks  Follow up with ID - Dr. Black - 2-4 weeks  Rash improved with prednisone and benadryl  Ortho has no new recommendations, Follow-up with Dr. 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 Gonzalez's team ASAP - Monday. at [facility]. Call our scheduling line at [phone] to make an   appointment, if you do not already have one scheduled.        Follow-up and recommended labs and tests       Follow up with primary care provider, Breanna Carlson, within 7 days for   hospital follow- up.  The following labs/tests are recommended: per ID.      Follow up with ID and ortho      As advised        {Additional follow-up instructions/to-do's for PCP    : none    Unresulted Labs Ordered in the Past 30 Days of this Admission       No orders found from 6/13/2023 to 7/14/2023.        These results will be followed up by PCP    Discharge Disposition   Discharged to home  Condition at discharge: Stable      Consultations This Hospital Stay   PHARMACY TO DOSE VANCO  PHARMACY TO DOSE VANCO  INFECTIOUS DISEASES IP CONSULT  ORTHOPEDIC SURGERY IP CONSULT  PHYSICAL THERAPY ADULT IP CONSULT  OCCUPATIONAL THERAPY ADULT IP CONSULT  VASCULAR ACCESS ADULT IP CONSULT    Code Status   Prior    Time Spent on this Encounter   Mark GOLDEN MD, personally saw the patient today and  spent greater than 30 minutes discharging this patient.       Mark Jacques MD  [address] [address]  Phone: [phone]  Fax: [phone]  ______________________________________________________________________    Physical Exam   Vital Signs:                    Weight: 164 lbs 7.41 oz        GENERAL: The patient is not in any acute distressed. Awake and alert.  HEENT: Nonicteric sclerae, PERRLA, EOMI. Oropharynx clear. Moist mucous membranes. Conjunctivae appear well perfused.  HEART: Regular rate and rhythm without murmurs.  LUNGS: Clear to auscultation bilaterally. No wheezing or crackles.  ABDOMEN: Soft, positive bowel sounds, nontender.  SKIN: generalized rash - improving, no excessive bruising, petechiae, or purpura.  EXTREMITIES : no rashes, no swelling in legs.  NEUROLOGIC: conscious and oriented, follows commands, no obvious focal deficits.  ROS: All other systems negative           Primary Care Physician   Physician No Ref-Primary    Discharge Orders      Infectious Disease Referral      Home Infusion Referral      Follow Up Care    Please follow-up with Dr. Gonzalez's team ASAP - Monday. at [facility]. Call our scheduling line at [phone] to make an appointment, if you do not already have one scheduled.     Weight bearing as tolerated    Weight bearing as tolerated on your operative extremity.     Activity     Patient care order    [facility]  Post Discharge orders    Admit to home care.    Fax laboratory test results to: [phone], Attention: Kristyn Black MD         Diagnosis: septic arthritis    Routine PICC line cares and flushing.  Please perform the following tests, weekly:      CBC, diff, platelets      Creatinine      SGOT         C-reactive protein         Amikacin trough, draw one half hour before dose is infused    Call [phone] to schedule follow-up appointment in 2 to 4  weeks with Dr Black     Reason for your hospital stay    Left foot infection     Follow-up and recommended labs and tests     Follow up with primary care provider, Breanna Carlson, within 7 days for hospital follow- up.  The following labs/tests are recommended: per ID.      Follow up with ID and ortho      As advised     Activity    Your activity upon discharge: activity as tolerated     Diet    Follow this diet upon discharge: Orders Placed This Encounter      Snacks/Supplements Adult: Ensure Enlive; With Meals      Regular Diet Adult       Significant Results and Procedures   Most Recent 3 CBC's:  Recent Labs   Lab Test 08/10/23  1345 08/03/23  1015 07/27/23  1230   WBC 7.2 8.9 10.5   HGB 10.8* 10.4* 10.1*   MCV 74* 75* 74*    430 435     Most Recent 3 BMP's:  Recent Labs   Lab Test 08/10/23  1345 08/03/23  1015 07/27/23  1230 07/20/23  1813 07/16/23  0644 07/15/23  0725 07/14/23  0739   NA  --   --   --   --  133* 131* 134*   POTASSIUM  --   --   --   --  3.5 4.0 4.1   CHLORIDE  --   --   --   --  101 100 102   CO2  --   --   --   --  20* 17* 16*   BUN 10.7 23.2* 24.2*   < > 12.8 13.6 17.9   CR 0.70 0.77 0.84   < > 0.99 1.13  1.12 1.08   ANIONGAP  --   --   --   --  12 14 16*   MANOJ  --   --   --   --  9.1 9.1 9.2   GLC  --   --   --   --  73 78 95    < > = values in this interval not displayed.     Most Recent 2 LFT's:  Recent Labs   Lab Test 08/10/23  1345 08/03/23  1015 07/16/23  0644 07/15/23  0725   AST 24 22 28 29   ALT  --   --  39 37   ALKPHOS  --   --  204* 248*   BILITOTAL  --   --  0.4 0.4     Most Recent 3 INR's:  Recent Labs   Lab Test 07/13/23  1832   INR 1.09       Discharge Medications   Discharge Medication List as of 7/17/2023  4:28 PM        START taking these medications    Details   ertapenem (INVANZ) 1 GM vial Inject 1 g into the vein every 24 hours for 27 days, Disp-270 mL, R-0, No Print Out           CONTINUE these medications which have CHANGED    Details   predniSONE (DELTASONE) 20 MG  "tablet Take 1 tablet (20 mg) by mouth daily for 5 days, Disp-5 tablet, R-0, Local Print      senna-docusate (SENOKOT-S/PERICOLACE) 8.6-50 MG tablet Take 2 tablets by mouth 2 times daily, Disp-28 tablet, R-0, E-Prescribe           CONTINUE these medications which have NOT CHANGED    Details   acetaminophen (TYLENOL) 325 MG tablet Take 650 mg by mouth every 8 hours as needed for mild pain, Historical      aspirin 81 MG EC tablet Take 1 tablet (81 mg) by mouth 2 times daily, Disp-56 tablet, R-0, E-Prescribe      colchicine (COLCRYS) 0.6 MG tablet Take 1 tablet (0.6 mg) by mouth 2 times daily, Disp-60 tablet, R-3, E-Prescribe      febuxostat (ULORIC) 40 MG TABS tablet Take 40 mg by mouth daily, Historical      hydrOXYzine (ATARAX) 25 MG tablet Take 1 tablet (25 mg) by mouth every 6 hours as needed for other (adjuvant pain), Disp-30 tablet, R-0, E-Prescribe      indomethacin (INDOCIN) 50 MG capsule Take 1 capsule (50 mg) by mouth 2 times daily (with meals), Disp-60 capsule, R-0, E-Prescribe      oxyCODONE (ROXICODONE) 5 MG tablet Take 1-2 tablets (5-10 mg) by mouth every 4 hours as needed for moderate pain, Disp-26 tablet, R-0, E-Prescribe           STOP taking these medications       cefTRIAXone (ROCEPHIN) 2 GM vial Comments:   Reason for Stopping:         meropenem (MERREM) 1 g vial Comments:   Reason for Stopping:         vancomycin (VANCOCIN) 1000 mg in dextrose 5% 200 mL PREMIX Comments:   Reason for Stopping:             Allergies   Allergies   Allergen Reactions    Allopurinol      \"Itching throughout body\"     "

## 2024-10-05 ENCOUNTER — HEALTH MAINTENANCE LETTER (OUTPATIENT)
Age: 44
End: 2024-10-05

## 2025-06-02 NOTE — PROGRESS NOTES
Emergency Department Encounter    Patient: Radha Ayers  MRN: [id number]  : 1973  Date of Evaluation: 2025  ED Provider:  Bita Cardoza MD    Triage Chief Complaint:   Hip Pain (Left hip pain/ Fell 25.)    [place]:  Radha Ayers is a 52 y.o. female that presents with concern for left hip pain.  She fell on 2025.  Had a blister that formed there and it still has not fully healed.  She has been keeping it clean, keeping a bandage on it.  No drainage from it.  Has been able to ambulate.  It hurts when she puts clothing across that area.  She had a large swollen area initially after the fall, was not seen initially.    ROS - see HPI, below listed is current ROS at time of my eval:  4 systems reviewed and negative except as above.     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, had one constructed as an infant    COLON SURGERY  2020    SALPINGO-OOPHORECTOMY N/A 2020    LAPAROSCOPY EXPLORATORY -CONVERTED TO  OPEN LEFT SALPINGECTOMY AND OOPHORECTOMY AND DRAINAGE OF TUBE OVARIAN ABSCESS AND LYSIS OF ADHESIONS performed by Moiz Lewis MD at [facility] OR     Family History   Problem Relation Age of Onset    Breast Cancer Neg Hx     Ovarian Cancer Neg Hx      Social History     Socioeconomic History    Marital status:      Spouse name: Not on file    Number of children: Not on file    Years of education: Not on file    Highest education level: Not on file   Occupational History    Not on file   Tobacco Use    Smoking status: Every Day     Current packs/day: 0.50     Average packs/day: 0.5 packs/day for 20.0 years (10.0  Infectious Disease Progress Note  07/07/2023    Assessment/Plan  Impression:  1. Bacteremia-- Streptococcus pyogenes (Group A Streptococcus)   2. Severe tophaceous gout involving multiple joints: Hand, foot, see photos below  3. Septic arthritis- Secondary bacterial infection with gram-positive cocci--Blood and wound culture are growing Group A strep.   4. Severe leukocytosis, white count 33 K on admission  5. IRRIGATION AND DEBRIDEMENT arthroscopic INCISION AND DRAINAGE, FOOT AND ANKLE including left ankle arthroscopy and subtalar arthroscopy and debridement on 7/5/2023    6. Worsening pain, drainage over the last 3 days  7. Also having a lot of L ankle pain.  Synovial fluid aspirated from left ankle, cultures in process, S/P surgery  8. Pain control is an active issue  9. History of hypertension and anemia.  Patient is on colchicine and Uloric prior to admission for gout     Plan discussed with patient with the help of [employer]  on the phone on 7/6/2023         Recommendations     1. Continue ceftriaxone for at least 6 weeks  2. Discontinued vancomycin.  Added clindamycin.  We will stop clindamycin on discharge  3. Once blood cultures from 7/5/2023 are negative for 72 hours  4. Follow blood cultures, CBC, CMP  5. Orthopedic consulted and S/P I&D-appreciate input from orthopedic surgery  6. Patient remain hospitalized at least for 3 to 4 days depending on blood culture results, clinical response  7. Multiple skin lesions, would need dermatology as an outpatient  8. ID discharge orders for IV antibiotics, labs and follow up entered  9. ID will sign off. Please call with questions       Principal Problem:    Acute idiopathic gout of right hand  Active Problems:    Chronic tophaceous gout    Cellulitis of finger of right hand    Acute idiopathic gout of left ankle      Kristyn Black MD  [facility]  Answering Service: [phone]  On-Call ID provider: [phone], option:  9      Subjective  Patient resting    Still with pain in L ankle.  Otherwise improving.   Overall better, continue antibiotics      Objective    Vital signs in last 24 hours  Temp:  [98.2  F (36.8  C)-98.6  F (37  C)] 98.2  F (36.8  C)  Pulse:  [80-93] 93  Resp:  [18] 18  BP: (122-128)/(77-89) 128/89  SpO2:  [95 %-99 %] 99 %  Wt Readings from Last 3 Encounters:   07/03/23 81.6 kg (180 lb)   03/29/21 91.2 kg (201 lb 1.3 oz)           Intake/Output last 3 shifts  I/O last 3 completed shifts:  In: 840 [P.O.:840]  Out: 1700 [Urine:1700]  Intake/Output this shift:  I/O this shift:  In: 486 [P.O.:480; I.V.:6]  Out: 200 [Urine:200]    Review of Systems   Pertinent items are noted in HPI., otherwise negative.    Physical Exam  GENERAL APPEARANCE:  awake  EYES: Eyes grossly normal to inspection, conjunctivae and sclerae normal  HENT: mouth without ulcers or lesions  NECK: supple  RESP: normal breathing pattern, clear to auscultation  CV: regular rates and rhythm, S1 S2  LYMPHATICS: Notable toes finger swelling  ABDOMEN: soft, nontender, nondistended  MS: extremities normal-patient is gout gross deformities noted-see above--still significant pain and swelling in Left ankle.   SKIN: Lesions, open wounds on right thumb, fingers, left middle finger edematous, multiple toes with gout and ulceration, tophaceous gout of multiple extremities  Papular brown discolored lesions on thighs, per patient has been there for several months  From patient's chart                    Pertinent Labs   Lab Results: personally reviewed.     Recent Labs   Lab 07/07/23  0635 07/06/23  0605 07/05/23 1804 07/04/23  0623   WBC 12.5*  --  11.6* 12.7*   HGB 9.3* 9.5* 9.8* 10.2*   HCT 30.7*  --  31.8* 33.4*     --  255 258        Recent Labs   Lab 07/07/23  0635 07/05/23  1804 07/04/23  0623   * 132* 132*   CO2 20* 17* 19*   BUN 11.4 15.0 16.6     No results found for: CULT  7-Day Micro Results     Collected Updated Procedure Result Status       07/06/2023 1506 07/07/2023 0501 Blood Culture Arm, Left [[id number]]   Blood from Arm, Left    Preliminary result Component Value   Culture No growth after 12 hours  [P]                07/06/2023 1500 07/07/2023 0501 Blood Culture Arm, Right [[id number]]   Blood from Arm, Right    Preliminary result Component Value   Culture No growth after 12 hours  [P]                07/05/2023 1804 07/06/2023 2116 Blood Culture Arm, Left [[id number]]   Blood from Arm, Left    Preliminary result Component Value   Culture No growth after 1 day  [P]                07/05/2023 1804 07/06/2023 2116 Blood Culture Arm, Left [[id number]]    Blood from Arm, Left    Preliminary result Component Value   Culture No growth after 1 day  [P]                07/05/2023 0823 07/06/2023 1616 Anaerobic Bacterial Culture Routine [[id number]]   Wound from Foot, Left    Preliminary result Component Value   Culture No anaerobic organisms isolated after 1 day  [P]                07/05/2023 0823 07/05/2023 1722 Gram Stain [[id number]]   (Abnormal)   Wound from Foot, Left    Final result Component Value   Gram Stain Result 3+ Gram positive cocci   Corrected on July 5, 2023/5:20 PM by Merry Leal  Previously reported as: No organisms seen  Gram Stain slide reviewed at the [facility].    Gram Stain Result 3+ WBC seen   Predominantly PMNs            07/05/2023 0823 07/06/2023 1616 Fungal or Yeast Culture Routine [[id number]]   Wound from Foot, Left    Preliminary result Component Value   Culture No growth after 1 day  [P]                07/05/2023 0823 07/07/2023 1017 Wound Aerobic Bacterial Culture Routine [[id number]]   (Abnormal)   Wound from Foot, Left    Final result Component Value   Culture 2+ Streptococcus pyogenes (Group A Streptococcus)    This organism is susceptible to ampicillin, penicillin, vancomycin and the cephalosporins. If treatment is required and your patient is allergic to penicillin,  contact the microbiology lab within 5 days to request susceptibility testing.               07/05/2023 0809 07/07/2023 1231 Anaerobic Bacterial Culture Routine [[id number]]   Wound from Foot, Left    Preliminary result Component Value   Culture No anaerobic organisms isolated after 2 days  [P]                07/05/2023 0809 07/05/2023 1408 Gram Stain [[id number]]   (Abnormal)   Wound from Foot, Left    Final result Component Value   Gram Stain Result 3+ Gram positive cocci   Gram Stain Result 3+ WBC seen   Predominantly PMNs            07/05/2023 0809 07/07/2023 0924 Wound Aerobic Bacterial Culture Routine [[id number]]   (Abnormal)   Wound from Foot, Left    Final result Component Value   Culture 2+ Streptococcus pyogenes (Group A Streptococcus)    This organism is susceptible to ampicillin, penicillin, vancomycin and the cephalosporins. If treatment is required and your patient is allergic to penicillin, contact the microbiology lab within 5 days to request susceptibility testing.    1+ Streptococcus pyogenes (Group A Streptococcus)    This organism is susceptible to ampicillin, penicillin, vancomycin and the cephalosporins. If treatment is required and your patient is allergic to penicillin, contact the microbiology lab within 5 days to request susceptibility testing.               07/05/2023 0800 07/07/2023 1246 Anaerobic Bacterial Culture Routine [[id number]]   Wound from Hand, Right    Preliminary result Component Value   Culture No anaerobic organisms isolated after 2 days  [P]                07/05/2023 0800 07/05/2023 1341 Gram Stain [[id number]]   Wound from Hand, Right    Final result Component Value   Gram Stain Result No organisms seen   Gram Stain Result 2+ WBC seen            07/05/2023 0800 07/07/2023 0738 Wound Aerobic Bacterial Culture Routine [[id number]]   Wound from Hand, Right    Final result Component Value   Culture No Growth               07/04/2023 1413 07/04/2023 1535 Joint Fluid  Exam [[id number]]    (Abnormal)   Synovial fluid from Ankle, Left    Final result Component Value   No component results            07/04/2023 1413 07/04/2023 1901 Cell count with differential fluid [[id number]]    (Abnormal)   Synovial fluid from Ankle, Left    Final result Component Value   No component results            07/04/2023 1413 07/06/2023 2147 Anaerobic Bacterial Culture Routine [[id number]]   Synovial fluid from Ankle, Left    Preliminary result Component Value   Culture No anaerobic organisms isolated after 2 days  [P]                07/04/2023 1413 07/07/2023 0812 Synovial fluid Aerobic Bacterial Culture Routine [[id number]]    (Abnormal)   Synovial fluid from Ankle, Left    Final result Component Value   Culture 4+ Streptococcus pyogenes (Group A Streptococcus)        Susceptibility      Streptococcus pyogenes (Group A Streptococcus)      DOUGLAS      Ampicillin <=0.06 ug/mL Susceptible      Cefotaxime <=0.25 ug/mL Susceptible      Ceftriaxone <=0.25 ug/mL Susceptible      Clindamycin <=0.06 ug/mL Susceptible      Erythromycin <=0.06 ug/mL Susceptible      Meropenem <=0.06 ug/mL Susceptible      Penicillin <=0.03 ug/mL Susceptible      Vancomycin 0.5 ug/mL Susceptible                           07/04/2023 1413 07/04/2023 2209 Gram Stain [[id number]]   (Abnormal)   Synovial fluid from Ankle, Left    Final result Component Value   Gram Stain Result 3+ Gram positive cocci   Gram Stain Result 3+ WBC seen   Predominantly PMNs            07/04/2023 1413 07/04/2023 1535 Crystal ID Synovial Fluid [[id number]]   (Abnormal)   Synovial fluid from Ankle, Left    Final result Component Value   Crystals Analysis Positive for extracellular crystals, consistent with monosodium urate crystals.            07/04/2023 1413 07/04/2023 1624 Cell Count Body Fluid [[id number]]    (Abnormal)   Synovial fluid from Ankle, Left    Final result Component Value Units   Color Red    Clarity Hazy    Cell Count Fluid Source  Ankle, Left    Total Nucleated Cells 50,340 /uL            07/04/2023 1413 07/04/2023 1901 Differential Body Fluid [[id number]]    Synovial fluid from Ankle, Left    Final result Component Value   % Neutrophils --   % Lymphocytes --   % Monocyte/Macrophages --            07/03/2023 0318 07/03/2023 1032 MRSA MSSA PCR, Nasal Swab [[id number]]    Swab from Nose    Final result Component Value   MRSA Target DNA Negative   SA Target DNA Positive            07/03/2023 0302 07/05/2023 0723 Blood Culture Peripheral Blood [[id number]]   (Abnormal)   Peripheral Blood    Final result Component Value   Culture Positive on the 1st day of incubation    Streptococcus pyogenes (Group A Streptococcus)    2 of 2 bottles    Susceptibilities done on previous cultures               07/03/2023 0242 07/05/2023 1041 Blood Culture Peripheral Blood [[id number]]    (Abnormal)   Peripheral Blood    Final result Component Value   Culture Positive on the 1st day of incubation    Streptococcus pyogenes (Group A Streptococcus)    2 of 2 bottles        Susceptibility      Streptococcus pyogenes (Group A Streptococcus)      DOUGLAS      Ampicillin <=0.06 ug/mL Susceptible      Cefotaxime <=0.25 ug/mL Susceptible      Ceftriaxone <=0.25 ug/mL Susceptible      Clindamycin <=0.06 ug/mL Susceptible      Meropenem <=0.06 ug/mL Susceptible      Penicillin <=0.03 ug/mL Susceptible      Vancomycin 0.25 ug/mL Susceptible                           07/03/2023 0242 07/03/2023 1857 Verigene GP Panel [[id number]]    (Abnormal)   Peripheral Blood    Final result Component Value   Staphylococcus species Not Detected   Staphylococcus aureus Not Detected   Staphylococcus epidermidis Not Detected   Staphylococcus lugdunensis Not Detected   Enterococcus faecalis Not Detected   Enterococcus faecium Not Detected   Streptococcus agalactiae Not Detected   Streptococcus anginosus group Not Detected   Streptococcus pneumoniae Not Detected   Streptococcus pyogenes  "Detected   Positive for Streptococcus pyogenes (Group A Streptococcus) by [facility] multiplex nucleic acid test. Penicillin and ampicillin are drugs of choice; non-susceptible isolates have not been reported. Final identification and antimicrobial susceptibility testing will be verified by standard methods.   Listeria species Not Detected            07/03/2023 0142 07/05/2023 0745 Abscess Aerobic Bacterial Culture Routine with Gram Stain [[id number]]   (Abnormal)   Abscess from Thumb, Right    Final result Component Value   Culture 2+ Streptococcus pyogenes (Group A Streptococcus)    This organism is susceptible to ampicillin, penicillin, vancomycin and the cephalosporins. If treatment is required and your patient is allergic to penicillin, contact the microbiology lab within 5 days to request susceptibility testing.   Gram Stain Result 1+ Gram positive cocci    2+ WBC seen                     Pertinent Radiology   Radiology Results:     POC US Guidance Needle Placement    Result Date: 7/5/2023  Ultrasound was performed as guidance to an anesthesia procedure.  Click \"PACS images\" hyperlink below to view any stored images.  For specific procedure details, view procedure note authored by anesthesia.    US Joint Injection Aspiration Major Left    Result Date: 7/4/2023  EXAM: 1. PUNCTURE AND ASPIRATION LEFT ANKLE JOINT 2. ULTRASOUND GUIDANCE LOCATION: [facility] DATE: 7/4/2023 INDICATION: left ankle  known gout, also Strep bacteremia, evaluate for septic arthritis. PROCEDURE: Informed consent obtained. Time out performed. The site was prepped and draped in sterile fashion. 10 mL of 1% lidocaine was infused into the local soft tissues. Under direct ultrasound guidance, a 22 gauge needle was placed into the fluid pocket and 5 ml of brown, cloudy fluid was aspirated. This was sent for cultures.     IMPRESSION: 1.  Status post ultrasound-guided puncture and aspiration of the left ankle joint. "     MR Foot Left w/o & w Contrast    Result Date: 7/4/2023  EXAM: MR FOOT LEFT W/O and W CONTRAST, MR ANKLE LEFT W/O and W CONTRAST LOCATION: [facility] DATE: 7/4/2023 INDICATION: left ankle pain, erosion of 2nd MT and phalanx, RO septic arthritis, gout, vs osteo COMPARISON: None. TECHNIQUE: Routine. Additional postgadolinium T1 sequences were obtained. IV CONTRAST: 8ml Gadavist FINDINGS: JOINTS AND BONES: -Extensive erosive osseous changes are seen in the left foot and ankle most pronounced in the first MTP joint as well as the second metatarsal head with additional erosive foci are seen in the midfoot, second mid and distal phalanx, with large proliferative erosive changes seen in the posterior subtalar joints and posterior aspect of the talar. These proliferative erosive soft tissue masses are relatively hypointense on T1 and isointense on T2-weighted heterogeneous enhancement on postcontrast  imaging. The bone marrow adjacent to the erosive masses is not significantly edematous or enhancing. TENDONS: -There is mild thickening and increased T2 signal of the distal aspect of the Achilles tendon (image 12, series 12). LIGAMENTS: -Lisfranc ligament: Intact. No subluxation. MUSCLES AND SOFT TISSUES: -Extensive large soft tissue masses is are seen in throughout the forefoot and hindfoot, which are low on T1 signal and intermediate T2 signal with variable postcontrast enhancement.     IMPRESSION: 1.  Multifocal extensive large soft tissue masses with underlying osseous erosion seen in the forefoot and hindfoot, most pronounced at the first MTP joint and second metatarsal head as well as in the posterior aspect of the ankle joint, favored reflect large gouty tophi given the patient's history and enhancement characteristics. 2.  No evidence of drainable soft tissue abscess or definitive evidence of osteomyelitis is seen. 3.  Mild Achilles tendinosis    MR Ankle Left w/o & w Contrast    Result  Date: 7/4/2023  EXAM: MR FOOT LEFT W/O and W CONTRAST, MR ANKLE LEFT W/O and W CONTRAST LOCATION: [facility] DATE: 7/4/2023 INDICATION: left ankle pain, erosion of 2nd MT and phalanx, RO septic arthritis, gout, vs osteo COMPARISON: None. TECHNIQUE: Routine. Additional postgadolinium T1 sequences were obtained. IV CONTRAST: 8ml Gadavist FINDINGS: JOINTS AND BONES: -Extensive erosive osseous changes are seen in the left foot and ankle most pronounced in the first MTP joint as well as the second metatarsal head with additional erosive foci are seen in the midfoot, second mid and distal phalanx, with large proliferative erosive changes seen in the posterior subtalar joints and posterior aspect of the talar. These proliferative erosive soft tissue masses are relatively hypointense on T1 and isointense on T2-weighted heterogeneous enhancement on postcontrast  imaging. The bone marrow adjacent to the erosive masses is not significantly edematous or enhancing. TENDONS: -There is mild thickening and increased T2 signal of the distal aspect of the Achilles tendon (image 12, series 12). LIGAMENTS: -Lisfranc ligament: Intact. No subluxation. MUSCLES AND SOFT TISSUES: -Extensive large soft tissue masses is are seen in throughout the forefoot and hindfoot, which are low on T1 signal and intermediate T2 signal with variable postcontrast enhancement.     IMPRESSION: 1.  Multifocal extensive large soft tissue masses with underlying osseous erosion seen in the forefoot and hindfoot, most pronounced at the first MTP joint and second metatarsal head as well as in the posterior aspect of the ankle joint, favored reflect large gouty tophi given the patient's history and enhancement characteristics. 2.  No evidence of drainable soft tissue abscess or definitive evidence of osteomyelitis is seen. 3.  Mild Achilles tendinosis    XR Foot 3 Views Standing Bilateral    Result Date: 7/3/2023  EXAM: XR FOOT 3 VIEWS  STANDING BILATERAL LOCATION: [facility] DATE: 7/3/2023 INDICATION: Chronic bilateral foot wounds. Evaluate osteomyelitis or occult pathology. History of gout. COMPARISON: None.     IMPRESSION: Marked erosive/destructive changes involving the right second MTP joint with extensive destruction of the second metatarsal head and neck as well as the majority of the proximal middle phalanges most likely secondary to gout with marked surrounding soft tissue swelling. Mild periarticular erosive changes involving the right third and fourth TMT and third MTP joint. In the left foot, there are marked periarticular erosive changes involving the MTP joint of the great toe with marked surrounding soft tissue swelling most consistent with gout. Mild periarticular erosive changes involving the second MTP and third TMT joints.     XR Finger Right G/E 2 Views    Result Date: 7/3/2023  EXAM: XR FINGER RIGHT G/E 2 VIEWS LOCATION: [facility] DATE: 7/3/2023 INDICATION: right thumb pain, h o gout COMPARISON: 03/29/2021     IMPRESSION: Marked soft tissue swelling of the proximal soft tissues of the thumb with periarticular osteopenia which likely reflect the sequela of patient's known gout. There is slight subluxation of the distal phalanx of the thumb relative to the proximal phalanx, though no evidence of a displaced fracture or dislocation.     XR Ankle Left G/E 3 Views    Result Date: 7/3/2023  EXAM: XR ANKLE LEFT G/E 3 VIEWS LOCATION: [facility] DATE: 7/3/2023 INDICATION: left ankle pain, h o gout COMPARISON: None.     IMPRESSION: No acute fracture or erosion. No foreign body.    Labs cultures notes reviewed, reviewed with orthopedic surgery  Reviewed with patient and patient's son

## (undated) DEVICE — BANDAGE ELASTIC VELCRO 2IN REB3012

## (undated) DEVICE — DRSG GAUZE 4X4" TRAY 6939

## (undated) DEVICE — TUBING SYSTEM ARTHREX PATIENT REDEUCE AR-6421

## (undated) DEVICE — GOWN LG DISP 9515

## (undated) DEVICE — GLOVE BIOGEL PI ULTRATOUCH G SZ 8.0 42180

## (undated) DEVICE — Device

## (undated) DEVICE — CUFF TOURN 24IN STRL DISP

## (undated) DEVICE — GLOVE BIOGEL PI ULTRATOUCH G SZ 7.0 42170

## (undated) DEVICE — PREP POVIDONE-IODINE 10% SOLUTION 4OZ BOTTLE MDS093944

## (undated) DEVICE — GLOVE BIOGEL PI INDICATOR 8.0 LF 41680

## (undated) DEVICE — CUSTOM PACK UPPER EXTREMITY SOP5BUEHEC

## (undated) DEVICE — CUSTOM PACK LOWER EXTREMITY SOP5BLEHEA

## (undated) DEVICE — BLADE KNIFE SURG 15 371115

## (undated) DEVICE — NEEDLE HYPO 18X1-1/2 SAFETY 305918

## (undated) DEVICE — DRSG ADAPTIC 3X3" 6112

## (undated) DEVICE — BANDAGE ELASTIC VELCRO 6IN REB3016

## (undated) DEVICE — SU ETHILON 3-0 PS-1 18" 1663G

## (undated) DEVICE — SYR 20ML LL W/O NDL 302830

## (undated) DEVICE — BANDAGE ELASTIC 4X550 LF DBL 593-94LF

## (undated) DEVICE — DRSG GAUZE 4X4" 3033

## (undated) DEVICE — DISTRACTOR STRAP ANKLE ARTHRO AR-1712

## (undated) DEVICE — SOL WATER IRRIG 1000ML BOTTLE 2F7114

## (undated) DEVICE — SU PROLENE 3-0 PS-1 18" 8663G

## (undated) DEVICE — CUSTOM PACK ARTHROSCOPY KNEE SOP5BAKHEA

## (undated) DEVICE — PADDING CAST 4IN WEBRIL STRL 2502

## (undated) DEVICE — PREP POVIDONE-IODINE 7.5% SCRUB 4OZ BOTTLE MDS093945

## (undated) DEVICE — BUR ARTHREX COOLCUT DISSECTOR 3.5MM  AR-8350DS

## (undated) DEVICE — TUBING IRRIG TUR Y TYPE 96" LF 6543-01

## (undated) DEVICE — PREP CHLORAPREP 26ML TINTED HI-LITE ORANGE 930815

## (undated) DEVICE — SYR 10ML FINGER CONTROL W/O NDL 309695

## (undated) DEVICE — SUCTION MANIFOLD NEPTUNE 2 SYS 1 PORT 702-025-000

## (undated) DEVICE — SU VICRYL+ 3-0 27IN SH UND VCP416H

## (undated) DEVICE — GLOVE BIOGEL PI INDICATOR 9.0 LF  41690

## (undated) DEVICE — STOCKINETTE TUBE 4X60 2PLY STRL STK3460

## (undated) DEVICE — SOLUTION IRRIG 2B7127 .9NS 3000ML BAG

## (undated) RX ORDER — LIDOCAINE HYDROCHLORIDE 10 MG/ML
INJECTION, SOLUTION EPIDURAL; INFILTRATION; INTRACAUDAL; PERINEURAL
Status: DISPENSED
Start: 2023-07-05

## (undated) RX ORDER — BUPIVACAINE HYDROCHLORIDE 5 MG/ML
INJECTION, SOLUTION EPIDURAL; INTRACAUDAL
Status: DISPENSED
Start: 2023-07-05

## (undated) RX ORDER — FENTANYL CITRATE 50 UG/ML
INJECTION, SOLUTION INTRAMUSCULAR; INTRAVENOUS
Status: DISPENSED
Start: 2023-07-05

## (undated) RX ORDER — LIDOCAINE HYDROCHLORIDE 20 MG/ML
INJECTION, SOLUTION INFILTRATION; PERINEURAL
Status: DISPENSED
Start: 2023-07-05

## (undated) RX ORDER — ONDANSETRON 2 MG/ML
INJECTION INTRAMUSCULAR; INTRAVENOUS
Status: DISPENSED
Start: 2023-07-05

## (undated) RX ORDER — LIDOCAINE HYDROCHLORIDE AND EPINEPHRINE 10; 10 MG/ML; UG/ML
INJECTION, SOLUTION INFILTRATION; PERINEURAL
Status: DISPENSED
Start: 2023-07-05